# Patient Record
Sex: FEMALE | Race: WHITE | NOT HISPANIC OR LATINO | Employment: UNEMPLOYED | URBAN - METROPOLITAN AREA
[De-identification: names, ages, dates, MRNs, and addresses within clinical notes are randomized per-mention and may not be internally consistent; named-entity substitution may affect disease eponyms.]

---
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
## 2022-01-10 ENCOUNTER — NURSE TRIAGE (OUTPATIENT)
Dept: OTHER | Facility: OTHER | Age: 22
End: 2022-01-10

## 2022-01-10 DIAGNOSIS — Z20.828 SARS-ASSOCIATED CORONAVIRUS EXPOSURE: Primary | ICD-10-CM

## 2022-01-11 NOTE — TELEPHONE ENCOUNTER
Reason for Disposition   [1] COVID-19 infection suspected by caller or triager AND [2] mild symptoms (cough, fever, or others) AND [3] has not gotten tested yet    Answer Assessment - Initial Assessment Questions  Were you within 6 feet or less, for up to 15 minutes or more with a person that has a confirmed COVID-19 test? yes  What was the date of your exposure?  5 days ago  Are you experiencing any symptoms attributed to the virus?  (Assess for SOB, cough, fever, difficulty breathing) sore throat, body aches  HIGH RISK: Do you have any history heart or lung conditions, weakened immune system, diabetes, Asthma, CHF, HIV, COPD, Chemo, renal failure, sickle cell, etc? VSD  PREGNANCY: Are you pregnant or did you recently give birth? no    Protocols used: CORONAVIRUS (COVID-19) DIAGNOSED OR SUSPECTED-ADULT-

## 2022-01-11 NOTE — TELEPHONE ENCOUNTER
Regarding: Covid Symptoms- Body Aches, Sore throat   ----- Message from Virgie, MA sent at 1/10/2022  7:24 PM EST -----  I'm calling because I was expose to covid and I have been having symptoms- Sore throat, Body aches started about  1-2 ago   I would like to obtain a test

## 2022-01-27 ENCOUNTER — TELEPHONE (OUTPATIENT)
Dept: FAMILY MEDICINE CLINIC | Facility: CLINIC | Age: 22
End: 2022-01-27

## 2022-02-07 ENCOUNTER — OFFICE VISIT (OUTPATIENT)
Dept: FAMILY MEDICINE CLINIC | Facility: CLINIC | Age: 22
End: 2022-02-07

## 2022-02-07 VITALS
WEIGHT: 119 LBS | SYSTOLIC BLOOD PRESSURE: 116 MMHG | RESPIRATION RATE: 18 BRPM | TEMPERATURE: 98.2 F | HEIGHT: 59 IN | OXYGEN SATURATION: 98 % | HEART RATE: 78 BPM | DIASTOLIC BLOOD PRESSURE: 72 MMHG | BODY MASS INDEX: 23.99 KG/M2

## 2022-02-07 DIAGNOSIS — Z30.013 ENCOUNTER FOR INITIAL PRESCRIPTION OF INJECTABLE CONTRACEPTIVE: Primary | ICD-10-CM

## 2022-02-07 DIAGNOSIS — Z30.42 DEPO-PROVERA CONTRACEPTIVE STATUS: ICD-10-CM

## 2022-02-07 PROCEDURE — 99213 OFFICE O/P EST LOW 20 MIN: CPT | Performed by: FAMILY MEDICINE

## 2022-02-07 RX ORDER — MEDROXYPROGESTERONE ACETATE 150 MG/ML
150 INJECTION, SUSPENSION INTRAMUSCULAR ONCE
Status: DISCONTINUED | OUTPATIENT
Start: 2022-02-07 | End: 2022-02-07

## 2022-02-07 NOTE — ASSESSMENT & PLAN NOTE
Patient is here to start Depo-Provera for contraception  She reports she has used previously and tolerated well, no contraindications  BP today 116/72, at goal  Patient counseled, depo administered today

## 2022-02-07 NOTE — PROGRESS NOTES
[place] VISIT NOTE [place] [facility] 25 y o  (OHN:[birth year]) female MRN: [id number]  Unit/Bed#:  Encounter: [id number]      Assessment/Plan:    Encounter for initial prescription of injectable contraceptive  Patient is here to start Depo-Provera for contraception  She reports she has used previously and tolerated well, no contraindications  BP today 116/72, at goal  Patient counseled, depo administered today       Diagnoses and all orders for this visit:    Encounter for initial prescription of injectable contraceptive    Depo-Provera contraceptive status  -     medroxyPROGESTERone (DEPO-PROVERA) IM injection 150 mg          Subjective:      Patient ID: Amelia Browning is a 25 y o  female  Presents for birth control counseling  Pt researched different birth control options and insists that she would like to receive depo  Currently menstruating  Periods usually last 5-7 days  The following portions of the patient's history were reviewed and updated as appropriate: allergies, current medications, past family history, past medical history, past social history, past surgical history and problem list     Review of Systems   Constitutional: Negative for chills, fatigue and fever  Eyes: Negative for visual disturbance  Respiratory: Negative for shortness of breath  Cardiovascular: Negative for chest pain and leg swelling  Gastrointestinal: Negative for abdominal distention, abdominal pain, constipation, diarrhea, nausea and vomiting  Genitourinary: Positive for vaginal bleeding (currently menstruating)  Negative for menstrual problem  Musculoskeletal: Negative for myalgias  Neurological: Negative for light-headedness and headaches  Psychiatric/Behavioral: Negative for sleep disturbance           Objective:    /72 (BP Location: Left arm, Patient Position: Sitting, Cuff Size: Standard)   Pulse 78   Temp 98 2 °F (36 8 °C) (Temporal)   Resp 18   Ht 4' 11" (1 499 m)   Wt 54 kg (119 lb)   SpO2 98%   BMI 24 04 kg/m²        Physical Exam  Vitals reviewed  Constitutional:       General: She is not in acute distress  Appearance: She is not ill-appearing or toxic-appearing  HENT:      Head: Normocephalic  Mouth/Throat:      Mouth: Mucous membranes are moist       Pharynx: Oropharynx is clear  Eyes:      General: No scleral icterus  Conjunctiva/sclera: Conjunctivae normal       Pupils: Pupils are equal, round, and reactive to light  Cardiovascular:      Rate and Rhythm: Normal rate and regular rhythm  Pulses: Normal pulses  Heart sounds: Murmur heard  Pulmonary:      Effort: Pulmonary effort is normal       Breath sounds: Normal breath sounds  No stridor  Abdominal:      General: Abdomen is flat  Bowel sounds are normal  There is no distension  Palpations: Abdomen is soft  Tenderness: There is no abdominal tenderness  Musculoskeletal:         General: No swelling  Normal range of motion  Skin:     General: Skin is warm and dry  Coloration: Skin is not jaundiced  Neurological:      General: No focal deficit present  Mental Status: She is alert and oriented to person, place, and time  Mental status is at baseline  Psychiatric:         Mood and Affect: Mood normal          Behavior: Behavior normal          Thought Content:  Thought content normal          Judgment: Judgment normal            Andrea Bustamante MD  PGY-2 Family Medicine  02/07/22

## 2022-03-08 ENCOUNTER — PATIENT OUTREACH (OUTPATIENT)
Dept: FAMILY MEDICINE CLINIC | Facility: CLINIC | Age: 22
End: 2022-03-08

## 2022-03-08 DIAGNOSIS — Z76.89 ENCOUNTER FOR SOCIAL WORK INTERVENTION: Primary | ICD-10-CM

## 2022-03-08 NOTE — PROGRESS NOTES
Received call from patient who reports that she is experiencing some anxiety and is interested in becoming involved with mental health treatment  Patient reports that she and her 2 month old child reside with her parents  Patient reports she is currently at home caring for her baby and attending college classes online  She reports that her parents are very supportive to her and her child  Patient states that she has been experiencing anxiety lately related to relationships that she is involved with  She reports that she does not feel depressed and has no thoughts of hurting herself or her child  Reviewed local in network outpatient mental health options with patient and list of same has been emailed to her per her request   Supportive counseling provided to patient who denies further needs at this time  Will continue to be available to provide support

## 2022-03-14 DIAGNOSIS — F41.8 DEPRESSION WITH ANXIETY: ICD-10-CM

## 2022-03-14 RX ORDER — SERTRALINE HYDROCHLORIDE 25 MG/1
TABLET, FILM COATED ORAL
Qty: 90 TABLET | Refills: 0 | Status: SHIPPED | OUTPATIENT
Start: 2022-03-14

## 2022-04-27 ENCOUNTER — CLINICAL SUPPORT (OUTPATIENT)
Dept: FAMILY MEDICINE CLINIC | Facility: CLINIC | Age: 22
End: 2022-04-27

## 2022-04-27 DIAGNOSIS — Z30.42 DEPO-PROVERA CONTRACEPTIVE STATUS: Primary | ICD-10-CM

## 2022-04-27 PROCEDURE — 96372 THER/PROPH/DIAG INJ SC/IM: CPT

## 2022-04-27 RX ORDER — MEDROXYPROGESTERONE ACETATE 150 MG/ML
150 INJECTION, SUSPENSION INTRAMUSCULAR ONCE
Status: COMPLETED | OUTPATIENT
Start: 2022-04-27 | End: 2022-04-27

## 2022-04-27 RX ADMIN — MEDROXYPROGESTERONE ACETATE 150 MG: 150 INJECTION, SUSPENSION INTRAMUSCULAR at 15:09

## 2022-04-27 NOTE — PROGRESS NOTES
Patient here today as a nurse visit for her Depo-Provera contraceptive injection  Medroxy Progesterone 1 ml administered on left deltoid and patient tolerate well  Patient aware she need to come back for her next dose on 7/13-7/27 2022

## 2022-07-22 ENCOUNTER — CLINICAL SUPPORT (OUTPATIENT)
Dept: FAMILY MEDICINE CLINIC | Facility: CLINIC | Age: 22
End: 2022-07-22

## 2022-07-22 DIAGNOSIS — Z30.42 ENCOUNTER FOR SURVEILLANCE OF INJECTABLE CONTRACEPTIVE: Primary | ICD-10-CM

## 2022-07-22 LAB — SL AMB POCT URINE HCG: NEGATIVE

## 2022-07-22 PROCEDURE — 96372 THER/PROPH/DIAG INJ SC/IM: CPT

## 2022-07-22 PROCEDURE — 81025 URINE PREGNANCY TEST: CPT

## 2022-07-22 RX ORDER — MEDROXYPROGESTERONE ACETATE 150 MG/ML
150 INJECTION, SUSPENSION INTRAMUSCULAR ONCE
Status: COMPLETED | OUTPATIENT
Start: 2022-07-22 | End: 2022-07-22

## 2022-07-22 RX ADMIN — MEDROXYPROGESTERONE ACETATE 150 MG: 150 INJECTION, SUSPENSION INTRAMUSCULAR at 14:39

## 2022-07-22 NOTE — PROGRESS NOTES
Patient here today as a nurse visit for her Depo-Provera contraceptive injection  Medroxy Progesterone 1 ml administered on left deltoid and patient tolerate well  POCT urine HCG pregnancy test came back negative

## 2022-08-23 ENCOUNTER — TELEPHONE (OUTPATIENT)
Dept: FAMILY MEDICINE CLINIC | Facility: CLINIC | Age: 22
End: 2022-08-23

## 2022-09-01 ENCOUNTER — OFFICE VISIT (OUTPATIENT)
Dept: FAMILY MEDICINE CLINIC | Facility: CLINIC | Age: 22
End: 2022-09-01
Payer: COMMERCIAL

## 2022-09-01 VITALS
TEMPERATURE: 98 F | OXYGEN SATURATION: 96 % | BODY MASS INDEX: 22.38 KG/M2 | SYSTOLIC BLOOD PRESSURE: 112 MMHG | WEIGHT: 111 LBS | RESPIRATION RATE: 16 BRPM | HEART RATE: 104 BPM | DIASTOLIC BLOOD PRESSURE: 70 MMHG | HEIGHT: 59 IN

## 2022-09-01 DIAGNOSIS — F41.8 DEPRESSION WITH ANXIETY: ICD-10-CM

## 2022-09-01 DIAGNOSIS — F41.9 ANXIETY: ICD-10-CM

## 2022-09-01 DIAGNOSIS — Z00.00 ANNUAL PHYSICAL EXAM: Primary | ICD-10-CM

## 2022-09-01 DIAGNOSIS — Z30.42 ENCOUNTER FOR SURVEILLANCE OF INJECTABLE CONTRACEPTIVE: ICD-10-CM

## 2022-09-01 PROBLEM — Z20.822 CLOSE EXPOSURE TO COVID-19 VIRUS: Status: RESOLVED | Noted: 2021-12-13 | Resolved: 2022-09-01

## 2022-09-01 PROBLEM — Z3A.38 38 WEEKS GESTATION OF PREGNANCY: Status: RESOLVED | Noted: 2021-03-17 | Resolved: 2022-09-01

## 2022-09-01 PROCEDURE — 99395 PREV VISIT EST AGE 18-39: CPT | Performed by: FAMILY MEDICINE

## 2022-09-01 PROCEDURE — 3725F SCREEN DEPRESSION PERFORMED: CPT | Performed by: FAMILY MEDICINE

## 2022-09-01 PROCEDURE — 87491 CHLMYD TRACH DNA AMP PROBE: CPT | Performed by: FAMILY MEDICINE

## 2022-09-01 PROCEDURE — 87591 N.GONORRHOEAE DNA AMP PROB: CPT | Performed by: FAMILY MEDICINE

## 2022-09-01 RX ORDER — ESCITALOPRAM OXALATE 10 MG/1
10 TABLET ORAL DAILY
Qty: 90 TABLET | Refills: 1 | Status: SHIPPED | OUTPATIENT
Start: 2022-09-01

## 2022-09-01 NOTE — PATIENT INSTRUCTIONS

## 2022-09-01 NOTE — ASSESSMENT & PLAN NOTE
Compliant with Depo Provera, last given 7/22/22, due for repeat between 10/8 - 10/22/22, patient will schedule for follow up visit

## 2022-09-01 NOTE — PROGRESS NOTES
[facility]    NAME: Poncho Alberts  AGE: 23 y o  SEX: female  : 2000     DATE: 2022     Assessment and Plan:     Problem List Items Addressed This Visit        Other    Anxiety     Was previously on Fluoxetine and Sertraline without good relief but did not take medication for adequate period  Due to predominantly anxious symptoms vs depressed, will try a less activating SSRI, Lexapro, for symptom management  Behavioral health referral placed as well  Message sent to SW to provide pt with resources covered by her insurance  Will follow up in 6 weeks to determine response to medication           Relevant Medications    escitalopram (Lexapro) 10 mg tablet    Encounter for surveillance of injectable contraceptive     Compliant with Depo Provera, last given 22, due for repeat between 10/8 - 10/22/22, patient will schedule for follow up visit         Annual physical exam - Primary    Relevant Orders    Hepatitis C antibody    Chlamydia/GC amplified DNA by PCR          Immunizations and preventive care screenings were discussed with patient today  Appropriate education was printed on patient's after visit summary  Counseling:  Alcohol/drug use: discussed moderation in alcohol intake, the recommendations for healthy alcohol use, and avoidance of illicit drug use  Dental Health: discussed importance of regular tooth brushing, flossing, and dental visits  Sexual health: discussed sexually transmitted diseases, partner selection, use of condoms, avoidance of unintended pregnancy, and contraceptive alternatives  · Exercise: the importance of regular exercise/physical activity was discussed  Recommend exercise 3-5 times per week for at least 30 minutes  Return in 13 months (on 10/16/2023) for depo provera       Chief Complaint:     Chief Complaint   Patient presents with    Physical Exam     Annual well exam History of Present Illness:     Adult Annual Physical   Patient here for a comprehensive physical exam  The patient reports     Anxiety surrounding normal challenges of motherhood  She feels irritable often and would like to try an alternative SSRI for anxious mood  She is balancing full time work at [employer] to become a , and being a mom to 10 month old Mindy  Diet and Physical Activity  · Diet/Nutrition: well balanced diet  · Exercise: no formal exercise  Depression Screening  PHQ-2/9 Depression Screening    Little interest or pleasure in doing things: 0 - not at all  Feeling down, depressed, or hopeless: 0 - not at all  Trouble falling or staying asleep, or sleeping too much: 0 - not at all  Feeling tired or having little energy: 0 - not at all  Poor appetite or overeatin - not at all  Feeling bad about yourself - or that you are a failure or have let yourself or your family down: 0 - not at all  Trouble concentrating on things, such as reading the newspaper or watching television: 0 - not at all  Moving or speaking so slowly that other people could have noticed  Or the opposite - being so fidgety or restless that you have been moving around a lot more than usual: 0 - not at all  Thoughts that you would be better off dead, or of hurting yourself in some way: 0 - not at all  PHQ-9 Score: 0   PHQ-9 Interpretation: No or Minimal depression        General Health  · Sleep: sleeps well  · Hearing: normal - bilateral   · Vision: goes for regular eye exams, most recent eye exam >1 year ago and wears glasses  · Dental: no dental visits for >1 year and brushes teeth twice daily  /GYN Health  · Contraceptive method: injectable contraception  Due for next depo shot between 10/8-10/22  · History of STDs?: no      Review of Systems:     Review of Systems   Psychiatric/Behavioral: The patient is nervous/anxious  All other systems reviewed and are negative  Past Medical History:     Past Medical History:   Diagnosis Date    Anxiety     Hyperlipidemia     Migraine       Past Surgical History:     History reviewed  No pertinent surgical history  Social History:     Social History     Socioeconomic History    Marital status: Single     Spouse name: None    Number of children: None    Years of education: None    Highest education level: None   Occupational History    None   Tobacco Use    Smoking status: Never Smoker    Smokeless tobacco: Never Used    Tobacco comment: vaping   Vaping Use    Vaping Use: Never used   Substance and Sexual Activity    Alcohol use: No    Drug use: No    Sexual activity: Yes     Partners: Male   Other Topics Concern    None   Social History Narrative    None     Social Determinants of Health     Financial Resource Strain: Medium Risk    Difficulty of Paying Living Expenses: Somewhat hard   Food Insecurity: No Food Insecurity    Worried About Running Out of Food in the Last Year: Never true    Liza of Food in the Last Year: Never true   Transportation Needs: No Transportation Needs    Lack of Transportation (Medical): No    Lack of Transportation (Non-Medical): No   Physical Activity: Not on file   Stress: Stress Concern Present    Feeling of Stress :  To some extent   Social Connections: Not on file   Intimate Partner Violence: Not on file   Housing Stability: Unknown    Unable to Pay for Housing in the Last Year: No    Number of [place] in the Last Year: Not on file    Unstable Housing in the Last Year: No      Family History:     Family History   Problem Relation Age of Onset    No Known Problems Mother     Heart disease Father       Current Medications:     Current Outpatient Medications   Medication Sig Dispense Refill    acetaminophen (TYLENOL) 325 mg tablet Take 2 tablets (650 mg total) by mouth every 4 (four) hours as needed for mild pain or headaches 30 tablet 0    escitalopram (Lexapro) 10 mg tablet Take 1 tablet (10 mg total) by mouth daily 90 tablet 1    ibuprofen (MOTRIN) 600 mg tablet Take 1 tablet (600 mg total) by mouth every 6 (six) hours as needed (cramping) 30 tablet 0     No current facility-administered medications for this visit  Allergies: Allergies   Allergen Reactions    Penicillins Hives      Physical Exam:     /70 (BP Location: Left arm, Patient Position: Sitting, Cuff Size: Standard)   Pulse 104   Temp 98 °F (36 7 °C) (Temporal)   Resp 16   Ht 4' 11" (1 499 m)   Wt 50 3 kg (111 lb)   SpO2 96%   BMI 22 42 kg/m²     Physical Exam  Vitals reviewed  Constitutional:       Appearance: Normal appearance  HENT:      Head: Normocephalic and atraumatic  Right Ear: External ear normal       Left Ear: External ear normal       Nose: Nose normal       Mouth/Throat:      Mouth: Mucous membranes are moist    Eyes:      Extraocular Movements: Extraocular movements intact  Conjunctiva/sclera: Conjunctivae normal    Cardiovascular:      Rate and Rhythm: Normal rate  Pulmonary:      Effort: Pulmonary effort is normal    Abdominal:      General: Abdomen is flat  Musculoskeletal:         General: Normal range of motion  Skin:     General: Skin is warm and dry  Neurological:      General: No focal deficit present  Mental Status: She is alert and oriented to person, place, and time     Psychiatric:         Mood and Affect: Mood normal          Behavior: Behavior normal           Arnoldo To DO   [facility]

## 2022-09-01 NOTE — ASSESSMENT & PLAN NOTE
Was previously on Fluoxetine and Sertraline without good relief but did not take medication for adequate period  Due to predominantly anxious symptoms vs depressed, will try a less activating SSRI, Lexapro, for symptom management  Behavioral health referral placed as well  Message sent to SW to provide pt with resources covered by her insurance  Will follow up in 6 weeks to determine response to medication

## 2022-09-02 ENCOUNTER — PATIENT OUTREACH (OUTPATIENT)
Dept: FAMILY MEDICINE CLINIC | Facility: CLINIC | Age: 22
End: 2022-09-02

## 2022-09-02 LAB
C TRACH DNA SPEC QL NAA+PROBE: NEGATIVE
N GONORRHOEA DNA SPEC QL NAA+PROBE: NEGATIVE

## 2022-09-02 NOTE — PROGRESS NOTES
Referral received from PCP with request for OP SWCM to outreach patient and assist in finding therapy/psychiatry services that are in-network with her insurance related to anxiety and depression  Per chart review, Nely Cutler  Nichole Sullivan provided patient with list of in-network providers in the past but patient does not have this list anymore  Patient has [facility] and [employer], per chart review  Call placed to further assist   No answer, voicemail left, and awaiting return call

## 2022-09-04 ENCOUNTER — HOSPITAL ENCOUNTER (EMERGENCY)
Facility: HOSPITAL | Age: 22
Discharge: HOME | End: 2022-09-04
Attending: EMERGENCY MEDICINE
Payer: COMMERCIAL

## 2022-09-04 ENCOUNTER — APPOINTMENT (OUTPATIENT)
Dept: RADIOLOGY | Facility: HOSPITAL | Age: 22
End: 2022-09-04
Payer: COMMERCIAL

## 2022-09-04 VITALS
WEIGHT: 111 LBS | SYSTOLIC BLOOD PRESSURE: 114 MMHG | TEMPERATURE: 98.7 F | OXYGEN SATURATION: 97 % | BODY MASS INDEX: 22.42 KG/M2 | DIASTOLIC BLOOD PRESSURE: 70 MMHG | HEART RATE: 63 BPM | RESPIRATION RATE: 16 BRPM

## 2022-09-04 DIAGNOSIS — R09.1 PLEURISY: Primary | ICD-10-CM

## 2022-09-04 PROBLEM — Q21.0 VSD (VENTRICULAR SEPTAL DEFECT AND AORTIC ARCH HYPOPLASIA: Status: ACTIVE | Noted: 2022-09-04

## 2022-09-04 PROBLEM — R01.1 HEART MURMUR: Status: ACTIVE | Noted: 2022-09-04

## 2022-09-04 PROBLEM — Q25.42 VSD (VENTRICULAR SEPTAL DEFECT AND AORTIC ARCH HYPOPLASIA: Status: ACTIVE | Noted: 2022-09-04

## 2022-09-04 LAB
ALBUMIN SERPL BCP-MCNC: 4.3 G/DL (ref 3.5–5)
ALP SERPL-CCNC: 91 U/L (ref 34–104)
ALT SERPL W P-5'-P-CCNC: 14 U/L (ref 7–52)
ANION GAP SERPL CALCULATED.3IONS-SCNC: 8 MMOL/L (ref 4–13)
AST SERPL W P-5'-P-CCNC: 19 U/L (ref 13–39)
ATRIAL RATE: 71 BPM
BACTERIA UR QL AUTO: ABNORMAL /HPF
BASOPHILS # BLD AUTO: 0.04 THOUSANDS/ΜL (ref 0–0.1)
BASOPHILS NFR BLD AUTO: 1 % (ref 0–1)
BILIRUB SERPL-MCNC: 0.41 MG/DL (ref 0.2–1)
BILIRUB UR QL STRIP: NEGATIVE
BUN SERPL-MCNC: 9 MG/DL (ref 5–25)
CALCIUM SERPL-MCNC: 9.2 MG/DL (ref 8.4–10.2)
CARDIAC TROPONIN I PNL SERPL HS: <2 NG/L
CHLORIDE SERPL-SCNC: 110 MMOL/L (ref 96–108)
CLARITY UR: ABNORMAL
CO2 SERPL-SCNC: 23 MMOL/L (ref 21–32)
COLOR UR: YELLOW
CREAT SERPL-MCNC: 0.7 MG/DL (ref 0.6–1.3)
EOSINOPHIL # BLD AUTO: 0.14 THOUSAND/ΜL (ref 0–0.61)
EOSINOPHIL NFR BLD AUTO: 2 % (ref 0–6)
ERYTHROCYTE [DISTWIDTH] IN BLOOD BY AUTOMATED COUNT: 12.7 % (ref 11.6–15.1)
EXT PREG TEST URINE: NEGATIVE
EXT. CONTROL ED NAV: NORMAL
GFR SERPL CREATININE-BSD FRML MDRD: 125 ML/MIN/1.73SQ M
GLUCOSE SERPL-MCNC: 109 MG/DL (ref 65–140)
GLUCOSE UR STRIP-MCNC: NEGATIVE MG/DL
HCT VFR BLD AUTO: 46.3 % (ref 34.8–46.1)
HGB BLD-MCNC: 15 G/DL (ref 11.5–15.4)
HGB UR QL STRIP.AUTO: NEGATIVE
IMM GRANULOCYTES # BLD AUTO: 0.01 THOUSAND/UL (ref 0–0.2)
IMM GRANULOCYTES NFR BLD AUTO: 0 % (ref 0–2)
KETONES UR STRIP-MCNC: NEGATIVE MG/DL
LEUKOCYTE ESTERASE UR QL STRIP: ABNORMAL
LYMPHOCYTES # BLD AUTO: 2.38 THOUSANDS/ΜL (ref 0.6–4.47)
LYMPHOCYTES NFR BLD AUTO: 37 % (ref 14–44)
MCH RBC QN AUTO: 29 PG (ref 26.8–34.3)
MCHC RBC AUTO-ENTMCNC: 32.4 G/DL (ref 31.4–37.4)
MCV RBC AUTO: 89 FL (ref 82–98)
MONOCYTES # BLD AUTO: 0.56 THOUSAND/ΜL (ref 0.17–1.22)
MONOCYTES NFR BLD AUTO: 9 % (ref 4–12)
MUCOUS THREADS UR QL AUTO: ABNORMAL
NEUTROPHILS # BLD AUTO: 3.26 THOUSANDS/ΜL (ref 1.85–7.62)
NEUTS SEG NFR BLD AUTO: 51 % (ref 43–75)
NITRITE UR QL STRIP: NEGATIVE
NON-SQ EPI CELLS URNS QL MICRO: ABNORMAL /HPF
NRBC BLD AUTO-RTO: 0 /100 WBCS
P AXIS: 30 DEGREES
PH UR STRIP.AUTO: 7 [PH]
PLATELET # BLD AUTO: 252 THOUSANDS/UL (ref 149–390)
PMV BLD AUTO: 9.8 FL (ref 8.9–12.7)
POTASSIUM SERPL-SCNC: 3.8 MMOL/L (ref 3.5–5.3)
PR INTERVAL: 148 MS
PROT SERPL-MCNC: 6.8 G/DL (ref 6.4–8.4)
PROT UR STRIP-MCNC: ABNORMAL MG/DL
QRS AXIS: 41 DEGREES
QRSD INTERVAL: 80 MS
QT INTERVAL: 372 MS
QTC INTERVAL: 404 MS
RBC # BLD AUTO: 5.18 MILLION/UL (ref 3.81–5.12)
RBC #/AREA URNS AUTO: ABNORMAL /HPF
SODIUM SERPL-SCNC: 141 MMOL/L (ref 135–147)
SP GR UR STRIP.AUTO: 1.03 (ref 1–1.03)
T WAVE AXIS: 28 DEGREES
UROBILINOGEN UR STRIP-ACNC: <2 MG/DL
VENTRICULAR RATE: 71 BPM
WBC # BLD AUTO: 6.39 THOUSAND/UL (ref 4.31–10.16)
WBC #/AREA URNS AUTO: ABNORMAL /HPF

## 2022-09-04 PROCEDURE — 81001 URINALYSIS AUTO W/SCOPE: CPT

## 2022-09-04 PROCEDURE — 81025 URINE PREGNANCY TEST: CPT

## 2022-09-04 PROCEDURE — 99284 EMERGENCY DEPT VISIT MOD MDM: CPT

## 2022-09-04 PROCEDURE — 84484 ASSAY OF TROPONIN QUANT: CPT

## 2022-09-04 PROCEDURE — 71046 X-RAY EXAM CHEST 2 VIEWS: CPT

## 2022-09-04 PROCEDURE — 93010 ELECTROCARDIOGRAM REPORT: CPT | Performed by: INTERNAL MEDICINE

## 2022-09-04 PROCEDURE — 80053 COMPREHEN METABOLIC PANEL: CPT

## 2022-09-04 PROCEDURE — 99285 EMERGENCY DEPT VISIT HI MDM: CPT | Performed by: EMERGENCY MEDICINE

## 2022-09-04 PROCEDURE — 85025 COMPLETE CBC W/AUTO DIFF WBC: CPT

## 2022-09-04 PROCEDURE — 93005 ELECTROCARDIOGRAM TRACING: CPT

## 2022-09-04 PROCEDURE — 36415 COLL VENOUS BLD VENIPUNCTURE: CPT

## 2022-09-04 NOTE — ED PROVIDER NOTES
History  Chief Complaint   Patient presents with    Rib Pain     Left sided rib pain when taking deep breath     Klever Lopez is a 23year old female presenting for evaluation of 2 days left-sided chest discomfort worse with deep breathing  The patient denies any inciting incident or injuries  She denies any significant shortness of breath or difficulty breathing  No recent infections, fevers, chills  She has a history of VSD, otherwise cardiopulmonary history was unremarkable  The patient admits to heavy vape use  No prior history of DVT or PE  Patient uses Depo-Provera for contraception  No recent travel, no recent surgeries  No recent lower extremity swelling or tenderness  Patient expressed concern about her pain being related to her kidney, she denies dysuria or hematuria at this time  History provided by:  Patient   used: No        Prior to Admission Medications   Prescriptions Last Dose Informant Patient Reported? Taking?   acetaminophen (TYLENOL) 325 mg tablet  Self No No   Sig: Take 2 tablets (650 mg total) by mouth every 4 (four) hours as needed for mild pain or headaches   escitalopram (Lexapro) 10 mg tablet   No No   Sig: Take 1 tablet (10 mg total) by mouth daily   ibuprofen (MOTRIN) 600 mg tablet  Self No No   Sig: Take 1 tablet (600 mg total) by mouth every 6 (six) hours as needed (cramping)      Facility-Administered Medications: None       Past Medical History:   Diagnosis Date    Anxiety     Heart murmur     Hyperlipidemia     Migraine     VSD (ventricular septal defect and aortic arch hypoplasia        History reviewed  No pertinent surgical history  Family History   Problem Relation Age of Onset    No Known Problems Mother     Heart disease Father      I have reviewed and agree with the history as documented      E-Cigarette/Vaping    E-Cigarette Use Current Every Day User      E-Cigarette/Vaping Substances    Nicotine Yes     THC No     CBD No     Flavoring Yes     Other No     Unknown No      Social History     Tobacco Use    Smoking status: Never Smoker    Smokeless tobacco: Never Used    Tobacco comment: vaping   Vaping Use    Vaping Use: Every day    Substances: Nicotine, Flavoring   Substance Use Topics    Alcohol use: No    Drug use: No        Review of Systems   Constitutional: Negative for chills and fever  HENT: Negative for ear pain and sore throat  Eyes: Negative for pain and visual disturbance  Respiratory: Negative for cough and shortness of breath  Cardiovascular: Positive for chest pain  Negative for palpitations and leg swelling  Gastrointestinal: Negative for abdominal pain and vomiting  Genitourinary: Negative for dysuria and hematuria  Musculoskeletal: Negative for arthralgias and back pain  Skin: Negative for color change and rash  Neurological: Negative for seizures and syncope  All other systems reviewed and are negative  Physical Exam  ED Triage Vitals   Temperature Pulse Respirations Blood Pressure SpO2   09/04/22 1332 09/04/22 1332 09/04/22 1332 09/04/22 1332 09/04/22 1332   98 7 °F (37 1 °C) 70 18 116/76 99 %      Temp Source Heart Rate Source Patient Position - Orthostatic VS BP Location FiO2 (%)   09/04/22 1332 09/04/22 1332 -- 09/04/22 1443 --   Oral Monitor  Right arm       Pain Score       09/04/22 1332       8             Orthostatic Vital Signs  Vitals:    09/04/22 1332 09/04/22 1443 09/04/22 1630   BP: 116/76 133/75 114/70   Pulse: 70 73 63       Physical Exam  Vitals and nursing note reviewed  Constitutional:       General: She is not in acute distress  Appearance: Normal appearance  HENT:      Head: Normocephalic and atraumatic  Mouth/Throat:      Mouth: Mucous membranes are moist       Pharynx: Oropharynx is clear  Eyes:      General: No scleral icterus  Conjunctiva/sclera: Conjunctivae normal    Cardiovascular:      Rate and Rhythm: Normal rate and regular rhythm  Heart sounds: Murmur heard  Pulmonary:      Effort: Pulmonary effort is normal  No respiratory distress  Breath sounds: Normal breath sounds  No wheezing, rhonchi or rales  Abdominal:      General: Abdomen is flat  There is no distension  Tenderness: There is no abdominal tenderness  Musculoskeletal:         General: No tenderness or signs of injury  Cervical back: Neck supple  No rigidity  Right lower leg: No edema  Left lower leg: No edema  Skin:     General: Skin is warm  Coloration: Skin is not jaundiced  Findings: No erythema or rash  Neurological:      General: No focal deficit present  Mental Status: She is alert  Mental status is at baseline     Psychiatric:         Mood and Affect: Mood normal          Behavior: Behavior normal          ED Medications  Medications - No data to display    Diagnostic Studies  Results Reviewed     Procedure Component Value Units Date/Time    Comprehensive metabolic panel [[id number]]  (Abnormal) Collected: 09/04/22 1548    Lab Status: Final result Specimen: Blood from Arm, Left Updated: 09/04/22 1629     Sodium 141 mmol/L      Potassium 3 8 mmol/L      Chloride 110 mmol/L      CO2 23 mmol/L      ANION GAP 8 mmol/L      BUN 9 mg/dL      Creatinine 0 70 mg/dL      Glucose 109 mg/dL      Calcium 9 2 mg/dL      AST 19 U/L      ALT 14 U/L      Alkaline Phosphatase 91 U/L      Total Protein 6 8 g/dL      Albumin 4 3 g/dL      Total Bilirubin 0 41 mg/dL      eGFR 125 ml/min/1 73sq m     Narrative:      [place] guidelines for Chronic Kidney Disease (CKD):     Stage 1 with normal or high GFR (GFR > 90 mL/min/1 73 square meters)    Stage 2 Mild CKD (GFR = 60-89 mL/min/1 73 square meters)    Stage 3A Moderate CKD (GFR = 45-59 mL/min/1 73 square meters)    Stage 3B Moderate CKD (GFR = 30-44 mL/min/1 73 square meters)    Stage 4 Severe CKD (GFR = 15-29 mL/min/1 73 square meters)    Stage 5 End Stage CKD (GFR <15 mL/min/1 73 square meters)  Note: GFR calculation is accurate only with a steady state creatinine    HS Troponin 0hr (reflex protocol) [[id number]]  (Normal) Collected: 09/04/22 1548    Lab Status: Final result Specimen: Blood from Arm, Left Updated: 09/04/22 1629     hs TnI 0hr <2 ng/L     Urine Microscopic [[id number]]  (Abnormal) Collected: 09/04/22 1549    Lab Status: Final result Specimen: Urine, Clean Catch Updated: 09/04/22 1617     RBC, UA 1-2 /hpf      WBC, UA 4-10 /hpf      Epithelial Cells Occasional /hpf      Bacteria, UA Occasional /hpf      MUCUS THREADS Occasional    POCT pregnancy, urine [[id number]]  (Normal) Resulted: 09/04/22 1610    Lab Status: Final result Updated: 09/04/22 1610     EXT PREG TEST UR (Ref: Negative) NEGATIVE     Control VALID    UA w Reflex to Microscopic w Reflex to Culture [[id number]]  (Abnormal) Collected: 09/04/22 1549    Lab Status: Final result Specimen: Urine, Clean Catch Updated: 09/04/22 1609     Color, UA Yellow     Clarity, UA Turbid     Specific [place], UA 1 026     pH, UA 7 0     Leukocytes, UA Trace     Nitrite, UA Negative     Protein, UA Trace mg/dl      Glucose, UA Negative mg/dl      Ketones, UA Negative mg/dl      Urobilinogen, UA <2 0 mg/dl      Bilirubin, UA Negative     Occult Blood, UA Negative    CBC and differential [[id number]]  (Abnormal) Collected: 09/04/22 1548    Lab Status: Final result Specimen: Blood from Arm, Left Updated: 09/04/22 1600     WBC 6 39 Thousand/uL      RBC 5 18 Million/uL      Hemoglobin 15 0 g/dL      Hematocrit 46 3 %      MCV 89 fL      MCH 29 0 pg      MCHC 32 4 g/dL      RDW 12 7 %      MPV 9 8 fL      Platelets 451 Thousands/uL      nRBC 0 /100 WBCs      Neutrophils Relative 51 %      Immat GRANS % 0 %      Lymphocytes Relative 37 %      Monocytes Relative 9 %      Eosinophils Relative 2 %      Basophils Relative 1 %      Neutrophils Absolute 3 26 Thousands/µL      Immature Grans Absolute 0 01 Thousand/uL      Lymphocytes Absolute 2 38 Thousands/µL      Monocytes Absolute 0 56 Thousand/µL      Eosinophils Absolute 0 14 Thousand/µL      Basophils Absolute 0 04 Thousands/µL                  XR chest 2 views   ED Interpretation by [id number], DO (09/04 1641)   No acute cardiopulmonary abnormalities            Procedures  ECG 12 Lead Documentation Only    Date/Time: 9/4/2022 7:02 PM  Performed by: Natalie Murphy DO  Authorized by: Natalie Murphy DO     Indications / Diagnosis:  Chest pain  ECG reviewed by me, the ED Provider: yes    Patient location:  ED  Previous ECG:     Previous ECG:  Compared to current    Similarity:  No change  Interpretation:     Interpretation: normal    Rate:     ECG rate:  71    ECG rate assessment: normal    Rhythm:     Rhythm: sinus rhythm    Ectopy:     Ectopy: none    QRS:     QRS axis:  Normal    QRS intervals:  Normal  Conduction:     Conduction: normal    ST segments:     ST segments:  Normal  T waves:     T waves: normal    Comments:      Normal sinus rhythm          ED Course                     PERC Rule for PE    Flowsheet Row Most Recent Value   PERC Rule for PE    Age >=50 0 Filed at: 09/04/2022 1507   HR >=100 0 Filed at: 09/04/2022 1507   O2 Sat on room air < 95% 0 Filed at: 09/04/2022 1507   History of PE or DVT 0 Filed at: 09/04/2022 1507   Recent trauma or surgery 0 Filed at: 09/04/2022 1507   Hemoptysis 0 Filed at: 09/04/2022 1507   Exogenous estrogen 0 Filed at: 09/04/2022 1507   Unilateral leg swelling 0 Filed at: 09/04/2022 1507   PERC Rule for PE Results 0 Filed at: 09/04/2022 1507                  Wells' Criteria for PE    Flowsheet Row Most Recent Value   Wells' Criteria for PE    Clinical signs and symptoms of DVT 0 Filed at: 09/04/2022 1507   PE is primary diagnosis or equally likely 0 Filed at: 09/04/2022 1507   HR >100 0 Filed at: 09/04/2022 1507   Immobilization at least 3 days or Surgery in the previous 4 weeks 0 Filed at: 09/04/2022 1507 Previous, objectively diagnosed PE or DVT 0 Filed at: 09/04/2022 1507   Hemoptysis 0 Filed at: 09/04/2022 1507   Malignancy with treatment within 6 months or palliative 0 Filed at: 09/04/2022 1507   Wells' Criteria Total 0 Filed at: 09/04/2022 1507            MDM  Number of Diagnoses or Management Options  Pleurisy: new and requires workup  Risk of Complications, Morbidity, and/or Mortality  General comments: Lb Hernandez is a 23year old female presenting for evaluation of 2 days left-sided chest discomfort worse with deep breathing  Patient is a chronic vape user  She does not have any significant cardiopulmonary history  Does not have significant risk factors for DVT or PE aside from smoking  Patient was not in any acute distress on exam, she was not tachypneic nor tachycardic, no increased work of breathing, she was satting 97-98% on room air  Heart and lung sounds were normal   Perc score of 0, I do not suspect pulmonary embolism at this time  I suspect that the patient has pleurisy secondary to her chronic vape use  ECG showed normal sinus rhythm, troponin within normal limits  Other blood work unremarkable  Chest x-ray unremarkable  Patient likely has pleuritic pain due to her vape use, advised that she stop vaping  I gave her very strict return precautions regarding chest pain, advised that she follow-up with her PCP, she was agreeable to the plan  Patient Progress  Patient progress: stable      Disposition  Final diagnoses:   Pleurisy     Time reflects when diagnosis was documented in both MDM as applicable and the Disposition within this note     Time User Action Codes Description Comment    9/4/2022  4:46 PM Wilner Gallegos Add [R09 1] Pleurisy       ED Disposition     ED Disposition   Discharge    Condition   Stable    Date/Time   Sun Sep 4, 2022  4:46 PM    Comment   Arlene Tasha discharge to home/self care                 Follow-up Information     Follow up With Specialties Details Why Contact Aden Tadeo Found, DO Family Medicine Schedule an appointment as soon as possible for a visit  As needed [id number]  [phone]            Discharge Medication List as of 9/4/2022  4:47 PM      CONTINUE these medications which have NOT CHANGED    Details   acetaminophen (TYLENOL) 325 mg tablet Take 2 tablets (650 mg total) by mouth every 4 (four) hours as needed for mild pain or headaches, Starting Wed 11/3/2021, Normal      escitalopram (Lexapro) 10 mg tablet Take 1 tablet (10 mg total) by mouth daily, Starting u 9/1/2022, Normal      ibuprofen (MOTRIN) 600 mg tablet Take 1 tablet (600 mg total) by mouth every 6 (six) hours as needed (cramping), Starting Wed 11/3/2021, Normal           No discharge procedures on file  PDMP Review     None           ED Provider  Attending physically available and evaluated [facility]  I managed the patient along with the ED Attending      Electronically Signed by         Samaria Judge DO  09/04/22 1594

## 2022-09-04 NOTE — Clinical Note
Kaila Smith was seen and treated in our emergency department on 9/4/2022  No restrictions            Diagnosis:     Carisa Tang  may return to work on return date  She may return on this date: 09/07/2022         If you have any questions or concerns, please don't hesitate to call        Nicho Huston RN    ______________________________           _______________          _______________  Hospital Representative                              Date                                Time

## 2022-09-04 NOTE — DISCHARGE INSTRUCTIONS
Attempt to refrain from smoking  Should you have worsening chest pain or shortness of breath, please return to the emergency department

## 2022-09-04 NOTE — Clinical Note
Ericka Nilsa was seen and treated in our emergency department on 9/4/2022  No restrictions            Diagnosis:     Andrade Fox  may return to work on return date  She may return on this date: 09/07/2022         If you have any questions or concerns, please don't hesitate to call        Aurora Dubon RN    ______________________________           _______________          _______________  Hospital Representative                              Date                                Time

## 2022-09-06 ENCOUNTER — PATIENT OUTREACH (OUTPATIENT)
Dept: FAMILY MEDICINE CLINIC | Facility: CLINIC | Age: 22
End: 2022-09-06

## 2022-09-06 NOTE — PROGRESS NOTES
Message received from patient in response to OP SWCM's initial outreach attempt to assist patient in establishing care with behavioral health services  Patient has lunch break from work around [employer]     Call placed to patient at 1:10PM   Spoke with patient who confirmed she is interested in seeing therapy and psychiatry for anxiety & depression  Patient denied any negative thoughts/feelings at this time and lives with her parents who are supportive  Patient works varying at [employer]; 10:30AM-6:30PM or 9AM-5PM   Patient verified insurance is through her dad's employer (private plan)  Informed that [facility] in [place] accepts her insurance  Patient agreed for OP SWCM to outreach [facility] to confirm insurance and start process of scheduling appts  Patient prefers in-person appts  No other needs at this time  Spoke with [facility] and rep confirmed they accept patient's insurance  They offer in-person appts for therapy and phone-only appts for psychiatry  Patient agreeable to this  Patient will call [facility] to verify schedule and coordinate with available appts  Phone # provided  Patient to call OP SWCM if she has difficulty with this  OP SWCM will follow-up in roughly 1 week to ensure appt has been scheduled  Routed to PCP

## 2022-09-13 ENCOUNTER — PATIENT OUTREACH (OUTPATIENT)
Dept: FAMILY MEDICINE CLINIC | Facility: CLINIC | Age: 22
End: 2022-09-13

## 2022-09-13 NOTE — PROGRESS NOTES
Call placed to patient to check status and follow-up on therapy/psychiatry services  Patient states she is scheduled for 1st therapy appt through [facility] on Monday 9/19  Patient will then be scheduled for psychiatry appt after therapy session is complete  Patient denied any additional needs at this time  Will close case but remain available to assist with any future needs

## 2022-10-17 ENCOUNTER — TELEPHONE (OUTPATIENT)
Dept: FAMILY MEDICINE CLINIC | Facility: CLINIC | Age: 22
End: 2022-10-17

## 2022-10-17 DIAGNOSIS — Z30.42 ENCOUNTER FOR SURVEILLANCE OF INJECTABLE CONTRACEPTIVE: Primary | ICD-10-CM

## 2022-10-17 RX ORDER — MEDROXYPROGESTERONE ACETATE 150 MG/ML
150 INJECTION, SUSPENSION INTRAMUSCULAR
Qty: 1 ML | Refills: 1 | Status: SHIPPED | OUTPATIENT
Start: 2022-10-17

## 2022-10-17 NOTE — TELEPHONE ENCOUNTER
Pt was scheduled for her Depo Provera injection today  However, medication was not sent to pharmacy and pt thought we keep the medication (Depo BC) in the office  Can you please send in Rx for Depo shot to pt's pharmacy  She will pick it up today and come in tomorrow for shot  Please see Dr Magdaleno  note excerpt from 9/1st OV         Encounter for surveillance of injectable contraceptive        Compliant with Depo Provera, last given 7/22/22, due for repeat between 10/8 - 10/22/22, patient will schedule for follow up visit

## 2022-10-18 ENCOUNTER — CLINICAL SUPPORT (OUTPATIENT)
Dept: FAMILY MEDICINE CLINIC | Facility: CLINIC | Age: 22
End: 2022-10-18
Payer: COMMERCIAL

## 2022-10-18 VITALS — TEMPERATURE: 98 F

## 2022-10-18 DIAGNOSIS — Z30.013 ENCOUNTER FOR INITIAL PRESCRIPTION OF INJECTABLE CONTRACEPTIVE: Primary | ICD-10-CM

## 2022-10-18 LAB — SL AMB POCT URINE HCG: NEGATIVE

## 2022-10-18 PROCEDURE — 96372 THER/PROPH/DIAG INJ SC/IM: CPT

## 2022-10-18 PROCEDURE — 81025 URINE PREGNANCY TEST: CPT

## 2022-10-18 RX ORDER — MEDROXYPROGESTERONE ACETATE 150 MG/ML
150 INJECTION, SUSPENSION INTRAMUSCULAR ONCE
Status: COMPLETED | OUTPATIENT
Start: 2022-10-18 | End: 2022-10-18

## 2022-10-18 RX ADMIN — MEDROXYPROGESTERONE ACETATE 150 MG: 150 INJECTION, SUSPENSION INTRAMUSCULAR at 13:11

## 2022-10-18 NOTE — TELEPHONE ENCOUNTER
Prescription sent  As this is first dose in our office, could you please check urine pregnancy test prior to administration  Thank you

## 2022-11-08 ENCOUNTER — OFFICE VISIT (OUTPATIENT)
Dept: URGENT CARE | Age: 22
End: 2022-11-08

## 2022-11-08 VITALS
SYSTOLIC BLOOD PRESSURE: 115 MMHG | HEART RATE: 66 BPM | OXYGEN SATURATION: 97 % | DIASTOLIC BLOOD PRESSURE: 67 MMHG | TEMPERATURE: 97.8 F | RESPIRATION RATE: 16 BRPM

## 2022-11-08 DIAGNOSIS — H92.01 RIGHT EAR PAIN: Primary | ICD-10-CM

## 2022-11-08 NOTE — PROGRESS NOTES
[facility]        NAME: Humera Palma is a 23 y o  female  : 2000    MRN: [id number]  DATE: 2022  TIME: 6:04 PM    Assessment and Plan   Right ear pain [H92 01]  1  Right ear pain     44-year-old female presents for evaluation of right ear pain since this morning  No signs of otitis media on exam   Recommend patient alternate Tylenol/ibuprofen and apply warm compresses and practice mastoid massage, as the pain is located chiefly in the posterior aspect of her ear along the mandibular angle  Follow-up with primary care provider if symptoms do not    Patient Instructions   Earache   DISCHARGE INSTRUCTIONS:   Return to the emergency department if:   · You have a severe earache      · You have ear pain with itching, hearing loss, dizziness, a feeling of fullness in your ear, or ringing in your ears      Call your doctor if:   · Your ear pain worsens or does not go away with treatment      · You have drainage from your ear      · You have a fever      · Your outer ear becomes red, swollen, and warm      · You have questions or concerns about your condition or care      Medicines: You may need any of the following:  · Acetaminophen  decreases pain and fever  It is available without a doctor's order  Ask how much to take and how often to take it  Follow directions  Read the labels of all other medicines you are using to see if they also contain acetaminophen, or ask your doctor or pharmacist  Acetaminophen can cause liver damage if not taken correctly  Do not use more than 4 grams (4,000 milligrams) total of acetaminophen in one day       · NSAIDs , such as ibuprofen, help decrease swelling, pain, and fever  This medicine is available with or without a doctor's order  NSAIDs can cause stomach bleeding or kidney problems in certain people  If you take blood thinner medicine, always ask your healthcare provider if NSAIDs are safe for you   Always read the medicine label and follow directions      · Do not give aspirin to children under 25years of age  Your child could develop Reye syndrome if he takes aspirin  Reye syndrome can cause life-threatening brain and liver damage  Check your child's medicine labels for aspirin, salicylates, or oil of wintergreen       · Take your medicine as directed  Contact your healthcare provider if you think your medicine is not helping or if you have side effects  Tell him or her if you are allergic to any medicine  Keep a list of the medicines, vitamins, and herbs you take  Include the amounts, and when and why you take them  Bring the list or the pill bottles to follow-up visits  Carry your medicine list with you in case of an emergency      Follow up with your doctor as directed:  Write down your questions so you remember to ask them during your visits  © [employer] 2022 Information is for End User's use only and may not be sold, redistributed or otherwise used for commercial purposes  All illustrations and images included in CareNotes® are the copyrighted property of A D A M , Inc  or [address] [place], [place] [address]   The above information is an  only  It is not intended as medical advice for individual conditions or treatments  Talk to your doctor, nurse or pharmacist before following any medical regimen to see if it is safe and effective for you  Follow up with PCP in 3-5 days  Proceed to  ER if symptoms worsen  Chief Complaint     Chief Complaint   Patient presents with   • Earache     Bilateral ear pain, feels like fluid is in it, no drainage, since this morning         History of Present Illness       Patient is a 74-year-old female past medical history significant for heart murmur, migraine, ventricular septal defect, who presents for evaluation of right ear pain since this morning  She denies fever, cough, body aches, chills, congestion, sore throat  She has not taken any pain medication    She rates the pain as an 8/10     Earache   Pertinent negatives include no coughing, diarrhea, ear discharge, headaches, hearing loss, neck pain, rash, rhinorrhea, sore throat or vomiting  Review of Systems   Review of Systems   Constitutional: Negative for fatigue and fever  HENT: Positive for ear pain  Negative for congestion, ear discharge, hearing loss, postnasal drip, rhinorrhea, sinus pressure, sinus pain, sneezing, sore throat and tinnitus  Eyes: Negative  Negative for pain, discharge, redness and itching  Respiratory: Negative  Negative for apnea, cough, choking, chest tightness, shortness of breath and stridor  Cardiovascular: Negative  Negative for chest pain and palpitations  Gastrointestinal: Negative  Negative for diarrhea, nausea and vomiting  Endocrine: Negative  Negative for polydipsia, polyphagia and polyuria  Genitourinary: Negative  Negative for decreased urine volume and flank pain  Musculoskeletal: Negative  Negative for arthralgias, back pain, myalgias, neck pain and neck stiffness  Skin: Negative  Negative for color change and rash  Allergic/Immunologic: Negative  Negative for environmental allergies  Neurological: Negative  Negative for dizziness, facial asymmetry, light-headedness, numbness and headaches  Hematological: Negative  Negative for adenopathy  Psychiatric/Behavioral: Negative            Current Medications       Current Outpatient Medications:   •  acetaminophen (TYLENOL) 325 mg tablet, Take 2 tablets (650 mg total) by mouth every 4 (four) hours as needed for mild pain or headaches, Disp: 30 tablet, Rfl: 0  •  escitalopram (Lexapro) 10 mg tablet, Take 1 tablet (10 mg total) by mouth daily, Disp: 90 tablet, Rfl: 1  •  ibuprofen (MOTRIN) 600 mg tablet, Take 1 tablet (600 mg total) by mouth every 6 (six) hours as needed (cramping), Disp: 30 tablet, Rfl: 0  •  medroxyPROGESTERone (DEPO-PROVERA) 150 mg/mL injection, Inject 1 mL (150 mg total) into a muscle every 3 (three) months, Disp: 1 mL, Rfl: 1    Current Allergies     Allergies as of 11/08/2022 - Reviewed 11/08/2022   Allergen Reaction Noted   • Penicillins Hives 12/13/2016            The following portions of the patient's history were reviewed and updated as appropriate: allergies, current medications, past family history, past medical history, past social history, past surgical history and problem list      Past Medical History:   Diagnosis Date   • Anxiety    • Heart murmur    • Hyperlipidemia    • Migraine    • VSD (ventricular septal defect and aortic arch hypoplasia        No past surgical history on file  Family History   Problem Relation Age of Onset   • No Known Problems Mother    • Heart disease Father          Medications have been verified  Objective   /67   Pulse 66   Temp 97 8 °F (36 6 °C)   Resp 16   SpO2 97%        Physical Exam     Physical Exam  Vitals and nursing note reviewed  Constitutional:       General: She is not in acute distress  Appearance: Normal appearance  She is toxic-appearing  She is not ill-appearing or diaphoretic  HENT:      Head: Normocephalic and atraumatic  Comments: Discomfort at base of posterior right ear near mandibular angle  Right Ear: Hearing, tympanic membrane, ear canal and external ear normal  There is no impacted cerumen  Tympanic membrane is not erythematous or bulging  Left Ear: Hearing, tympanic membrane, ear canal and external ear normal  There is no impacted cerumen  Tympanic membrane is not erythematous or bulging  Nose: Nose normal  No congestion or rhinorrhea  Mouth/Throat:      Mouth: Mucous membranes are moist       Tongue: No lesions  Palate: No mass  Pharynx: Oropharynx is clear  Uvula midline  No pharyngeal swelling, oropharyngeal exudate, posterior oropharyngeal erythema or uvula swelling  Tonsils: No tonsillar exudate or tonsillar abscesses  1+ on the right  1+ on the left     Eyes: Extraocular Movements: Extraocular movements intact  Conjunctiva/sclera: Conjunctivae normal       Pupils: Pupils are equal, round, and reactive to light  Cardiovascular:      Rate and Rhythm: Normal rate and regular rhythm  Pulses: Normal pulses  Heart sounds: Normal heart sounds  No murmur heard  No friction rub  No gallop  Pulmonary:      Effort: Pulmonary effort is normal  No respiratory distress  Breath sounds: Normal breath sounds  No stridor  No wheezing, rhonchi or rales  Abdominal:      General: Bowel sounds are normal       Palpations: Abdomen is soft  Tenderness: There is no abdominal tenderness  There is no guarding or rebound  Musculoskeletal:         General: Normal range of motion  Cervical back: Normal range of motion and neck supple  No rigidity or tenderness  Lymphadenopathy:      Cervical: No cervical adenopathy  Skin:     General: Skin is warm and dry  Capillary Refill: Capillary refill takes less than 2 seconds  Neurological:      General: No focal deficit present  Mental Status: She is alert and oriented to person, place, and time  Cranial Nerves: No cranial nerve deficit     Psychiatric:         Mood and Affect: Mood normal          Behavior: Behavior normal

## 2022-11-08 NOTE — PATIENT INSTRUCTIONS
Alternate Tylenol and ibuprofen and apply warm compress with gentle massage  Follow-up with primary care provider symptoms do not resolve within 3-5 days

## 2022-11-08 NOTE — LETTER
November 8, 2022     Patient: April Olsen   YOB: 2000   Date of Visit: 11/8/2022       To Whom it May Concern:    April Olsen was seen in my clinic on 11/8/2022  She may return on 11/9/2022  If you have any questions or concerns, please don't hesitate to call           Sincerely,          ANGELA Negron        CC: No Recipients

## 2023-01-04 ENCOUNTER — OFFICE VISIT (OUTPATIENT)
Dept: URGENT CARE | Age: 23
End: 2023-01-04

## 2023-01-04 VITALS
OXYGEN SATURATION: 97 % | HEART RATE: 84 BPM | RESPIRATION RATE: 18 BRPM | SYSTOLIC BLOOD PRESSURE: 102 MMHG | DIASTOLIC BLOOD PRESSURE: 68 MMHG | TEMPERATURE: 97.7 F

## 2023-01-04 DIAGNOSIS — B34.9 VIRAL SYNDROME: Primary | ICD-10-CM

## 2023-01-04 LAB
SARS-COV-2 AG UPPER RESP QL IA: NEGATIVE
VALID CONTROL: NORMAL

## 2023-01-04 RX ORDER — CYCLOBENZAPRINE HCL 10 MG
TABLET ORAL
COMMUNITY
Start: 2022-11-09

## 2023-01-04 RX ORDER — BROMPHENIRAMINE MALEATE, PSEUDOEPHEDRINE HYDROCHLORIDE, AND DEXTROMETHORPHAN HYDROBROMIDE 2; 30; 10 MG/5ML; MG/5ML; MG/5ML
5 SYRUP ORAL 4 TIMES DAILY PRN
Qty: 120 ML | Refills: 0 | Status: SHIPPED | OUTPATIENT
Start: 2023-01-04

## 2023-01-04 NOTE — LETTER
January 4, 2023     Patient: Arlene Cordova   YOB: 2000   Date of Visit: 1/4/2023       To Whom it May Concern:    Arlene Cordova was seen in my clinic on 1/4/2023  She  May return on 1/06/2022  If you have any questions or concerns, please don't hesitate to call           Sincerely,          ANGELA Walter        CC: No Recipients

## 2023-01-04 NOTE — PROGRESS NOTES
[facility]        NAME: Rudy Mercer is a 23 y o  female  : 2000    MRN: [id number]  DATE: 2023  TIME: 9:48 AM    Assessment and Plan   Viral syndrome [B34 9]  1  Viral syndrome  Poct Covid 19 Rapid Antigen Test    Covid/Flu-Office Collect    brompheniramine-pseudoephedrine-DM 30-2-10 MG/5ML syrup      Rapid COVID-negative, will send COVID/flu cultures  Bromfed for cough and congestion  Patient advised to stay well-hydrated, trial humidifier for congestion  Follow-up with primary care provider if symptoms do not resolve within 1 to 2 weeks  Patient Instructions   Viral Syndrome   WHAT YOU NEED TO KNOW:   Viral syndrome is a term used for symptoms of an infection caused by a virus  Viruses are spread easily from person to person through the air and on shared items  DISCHARGE INSTRUCTIONS:   Call your local emergency number (911 in the [address] [place] [address],[address]) or have someone else call if:   • You have a seizure      • You cannot be woken      • You have chest pain or trouble breathing      Return to the emergency department if:   • You have a stiff neck, a bad headache, and sensitivity to light      • You feel weak, dizzy, or confused      • You stop urinating or urinate a lot less than usual      • You cough up blood or thick yellow or green mucus      • You have severe abdominal pain or your abdomen is larger than usual      Call your doctor if:   • Your symptoms do not get better with treatment or get worse after 3 days      • You have a rash or ear pain      • You have burning when you urinate      • You have questions or concerns about your condition or care      Medicines: You may  need any of the following:  • Acetaminophen  decreases pain and fever  It is available without a doctor's order  Ask how much to take and how often to take it  Follow directions   Read the labels of all other medicines you are using to see if they also contain acetaminophen, or ask your doctor or pharmacist  Acetaminophen can cause liver damage if not taken correctly  Do not use more than 4 grams (4,000 milligrams) total of acetaminophen in one day       • NSAIDs , such as ibuprofen, help decrease swelling, pain, and fever  NSAIDs can cause stomach bleeding or kidney problems in certain people  If you take blood thinner medicine, always ask your healthcare provider if NSAIDs are safe for you  Always read the medicine label and follow directions      • Cold medicine  helps decrease swelling, control a cough, and relieve chest or nasal congestion       • Saline nasal spray  helps decrease nasal congestion       • Take your medicine as directed  Contact your healthcare provider if you think your medicine is not helping or if you have side effects  Tell him of her if you are allergic to any medicine  Keep a list of the medicines, vitamins, and herbs you take  Include the amounts, and when and why you take them  Bring the list or the pill bottles to follow-up visits  Carry your medicine list with you in case of an emergency      Manage your symptoms:   • Drink liquids as directed to prevent dehydration  Ask how much liquid to drink each day and which liquids are best for you  Ask if you should drink an oral rehydration solution (ORS)  An ORS has the right amounts of water, salts, and sugar you need to replace body fluids  This may help prevent dehydration caused by vomiting or diarrhea  Do not drink liquids with caffeine  Liquids with caffeine can make dehydration worse      • Get plenty of rest to help your body heal   Take naps throughout the day  Ask your healthcare provider when you can return to work and your normal activities      • Use a cool mist humidifier to help you breathe easier  Ask your healthcare provider how to use a cool mist humidifier      • Eat honey or use cough drops for a sore throat  Cough drops are available without a doctor's order   Follow directions for taking cough drops      • Do not smoke or be close to anyone who is smoking  Nicotine and other chemicals in cigarettes and cigars can cause lung damage  Smoking can also delay healing  Ask your healthcare provider for information if you currently smoke and need help to quit  E-cigarettes or smokeless tobacco still contain nicotine  Talk to your healthcare provider before you use these products      Prevent the spread of germs:       • Wash your hands often  Wash your hands several times each day  Wash after you use the bathroom, change a child's diaper, and before you prepare or eat food  Use soap and water every time  Rub your soapy hands together, lacing your fingers  Wash the front and back of your hands, and in between your fingers  Use the fingers of one hand to scrub under the fingernails of the other hand  Wash for at least 20 seconds  Rinse with warm, running water for several seconds  Then dry your hands with a clean towel or paper towel  Use hand  that contains alcohol if soap and water are not available  Do not touch your eyes, nose, or mouth without washing your hands first           • Cover a sneeze or cough  Use a tissue that covers your mouth and nose  Throw the tissue away in a trash can right away  Use the bend of your arm if a tissue is not available  Wash your hands well with soap and water or use a hand       • Stay away from others while you are sick  Avoid crowds as much as possible      • Ask about vaccines you may need  Talk to your healthcare provider about your vaccine history  He or she will tell you which vaccines you need, and when to get them      ? Get the influenza (flu) vaccine as soon as recommended each year  The flu vaccine is available starting in September or October  Flu viruses change, so it is important to get a flu vaccine every year      ? Get the pneumonia vaccine if recommended  This vaccine is usually recommended every 5 years   Your provider will tell you when to get this vaccine, if needed      Follow up with your doctor as directed:  Write down your questions so you remember to ask them during your visits  © Copyright [employer] 2022 Information is for End User's use only and may not be sold, redistributed or otherwise used for commercial purposes  All illustrations and images included in CareNotes® are the copyrighted property of [employer] M [employer] Inc  or [employer]  The above information is an  only  It is not intended as medical advice for individual conditions or treatments  Talk to your doctor, nurse or pharmacist before following any medical regimen to see if it is safe and effective for you  Follow up with PCP in 3-5 days  Proceed to  ER if symptoms worsen  Chief Complaint     Chief Complaint   Patient presents with   • Sore Throat   • Generalized Body Aches   • Cough     Patient been sick for 1 day with her symptoms         History of Present Illness       Patient is a 53-year-old female with past medical history significant for heart murmur, ventricular septal defect, who presents for evaluation of body aches, sore throat, cough and congestion since yesterday  She also reports some diarrhea  She did have a recent positive COVID exposure  She denies fever, nausea/vomiting, neck pain or stiffness, chest pain or palpitations  While in office she did experience an episode of chest tightness  Sore Throat   Associated symptoms include congestion and coughing  Pertinent negatives include no diarrhea, ear discharge, ear pain, headaches, neck pain, shortness of breath, stridor or vomiting  Generalized Body Aches  Associated symptoms include congestion, a sore throat and coughing  Pertinent negatives include no ear discharge, ear pain, eye discharge, eye itching, eye pain, eye redness, headaches, rhinorrhea, stridor, fatigue, fever, chest pain, shortness of breath, wheezing, diarrhea, nausea, vomiting, neck pain or rash     Cough  Associated symptoms include myalgias and a sore throat  Pertinent negatives include no chest pain, ear pain, eye redness, fever, headaches, postnasal drip, rash, rhinorrhea, shortness of breath or wheezing  There is no history of environmental allergies  Review of Systems   Review of Systems   Constitutional: Negative for fatigue and fever  HENT: Positive for congestion and sore throat  Negative for ear discharge, ear pain, postnasal drip, rhinorrhea, sinus pressure, sinus pain and sneezing  Eyes: Negative  Negative for pain, discharge, redness and itching  Respiratory: Positive for cough  Negative for apnea, choking, chest tightness, shortness of breath, wheezing and stridor  Cardiovascular: Negative  Negative for chest pain and palpitations  Gastrointestinal: Negative  Negative for diarrhea, nausea and vomiting  Endocrine: Negative  Negative for polydipsia, polyphagia and polyuria  Genitourinary: Negative  Negative for decreased urine volume and flank pain  Musculoskeletal: Positive for myalgias  Negative for arthralgias, back pain, neck pain and neck stiffness  Skin: Negative  Negative for color change and rash  Allergic/Immunologic: Negative  Negative for environmental allergies  Neurological: Negative  Negative for dizziness, facial asymmetry, light-headedness, numbness and headaches  Hematological: Negative  Negative for adenopathy  Psychiatric/Behavioral: Negative            Current Medications       Current Outpatient Medications:   •  brompheniramine-pseudoephedrine-DM 30-2-10 MG/5ML syrup, Take 5 mL by mouth 4 (four) times a day as needed for congestion, cough or allergies, Disp: 120 mL, Rfl: 0  •  acetaminophen (TYLENOL) 325 mg tablet, Take 2 tablets (650 mg total) by mouth every 4 (four) hours as needed for mild pain or headaches, Disp: 30 tablet, Rfl: 0  •  cyclobenzaprine (FLEXERIL) 10 mg tablet, TAKE 1 TABLET BY MOUTH EVERY DAY NIGHTLY AS NEEDED FOR MUSCLE SPASMS, Disp: , Rfl:   • escitalopram (Lexapro) 10 mg tablet, Take 1 tablet (10 mg total) by mouth daily, Disp: 90 tablet, Rfl: 1  •  ibuprofen (MOTRIN) 600 mg tablet, Take 1 tablet (600 mg total) by mouth every 6 (six) hours as needed (cramping), Disp: 30 tablet, Rfl: 0  •  medroxyPROGESTERone (DEPO-PROVERA) 150 mg/mL injection, Inject 1 mL (150 mg total) into a muscle every 3 (three) months, Disp: 1 mL, Rfl: 1    Current Allergies     Allergies as of 01/04/2023 - Reviewed 01/04/2023   Allergen Reaction Noted   • Penicillins Hives 12/13/2016            The following portions of the patient's history were reviewed and updated as appropriate: allergies, current medications, past family history, past medical history, past social history, past surgical history and problem list      Past Medical History:   Diagnosis Date   • Anxiety    • Heart murmur    • Hyperlipidemia    • Migraine    • VSD (ventricular septal defect and aortic arch hypoplasia        No past surgical history on file  Family History   Problem Relation Age of Onset   • No Known Problems Mother    • Heart disease Father          Medications have been verified  Objective   /68 (BP Location: Right arm, Patient Position: Sitting, Cuff Size: Standard)   Pulse 84   Temp 97 7 °F (36 5 °C)   Resp 18   SpO2 97%        Physical Exam     Physical Exam  Vitals and nursing note reviewed  Constitutional:       General: She is not in acute distress  Appearance: Normal appearance  She is not ill-appearing, toxic-appearing or diaphoretic  Interventions: She is not intubated  HENT:      Head: Normocephalic and atraumatic  Right Ear: Tympanic membrane and ear canal normal  No drainage, swelling or tenderness  No middle ear effusion  Tympanic membrane is not erythematous  Left Ear: Tympanic membrane and ear canal normal  No drainage, swelling or tenderness  No middle ear effusion  Tympanic membrane is not erythematous        Nose: Nose normal  No congestion or rhinorrhea  Mouth/Throat:      Mouth: Mucous membranes are moist  No oral lesions  Pharynx: Uvula midline  No pharyngeal swelling, oropharyngeal exudate, posterior oropharyngeal erythema or uvula swelling  Tonsils: No tonsillar exudate or tonsillar abscesses  0 on the right  0 on the left  Eyes:      Extraocular Movements: Extraocular movements intact  Conjunctiva/sclera: Conjunctivae normal       Pupils: Pupils are equal, round, and reactive to light  Neck:      Thyroid: No thyromegaly  Cardiovascular:      Rate and Rhythm: Normal rate and regular rhythm  Pulses: Normal pulses  Heart sounds: S1 normal and S2 normal  Heart sounds not distant  Murmur heard  No friction rub  No gallop  Pulmonary:      Effort: Pulmonary effort is normal  No tachypnea, bradypnea, accessory muscle usage, prolonged expiration, respiratory distress or retractions  She is not intubated  Breath sounds: Normal breath sounds  No stridor, decreased air movement or transmitted upper airway sounds  No decreased breath sounds, wheezing, rhonchi or rales  Abdominal:      General: Bowel sounds are normal       Palpations: Abdomen is soft  Tenderness: There is no abdominal tenderness  There is no guarding or rebound  Musculoskeletal:         General: Normal range of motion  Cervical back: Normal range of motion and neck supple  No tenderness  Lymphadenopathy:      Cervical: No cervical adenopathy  Skin:     General: Skin is warm and dry  Capillary Refill: Capillary refill takes less than 2 seconds  Neurological:      General: No focal deficit present  Mental Status: She is alert and oriented to person, place, and time  Cranial Nerves: No cranial nerve deficit     Psychiatric:         Mood and Affect: Mood normal          Behavior: Behavior normal

## 2023-01-05 LAB
FLUAV RNA RESP QL NAA+PROBE: NEGATIVE
FLUBV RNA RESP QL NAA+PROBE: NEGATIVE
SARS-COV-2 RNA RESP QL NAA+PROBE: NEGATIVE

## 2023-01-06 ENCOUNTER — HOSPITAL ENCOUNTER (EMERGENCY)
Facility: HOSPITAL | Age: 23
Discharge: HOME | End: 2023-01-06
Attending: EMERGENCY MEDICINE

## 2023-01-06 VITALS
SYSTOLIC BLOOD PRESSURE: 126 MMHG | HEART RATE: 99 BPM | OXYGEN SATURATION: 98 % | RESPIRATION RATE: 18 BRPM | DIASTOLIC BLOOD PRESSURE: 65 MMHG | TEMPERATURE: 99 F

## 2023-01-06 DIAGNOSIS — J06.9 VIRAL URI WITH COUGH: ICD-10-CM

## 2023-01-06 DIAGNOSIS — U07.1 COVID-19: Primary | ICD-10-CM

## 2023-01-06 RX ORDER — IBUPROFEN 600 MG/1
600 TABLET ORAL EVERY 6 HOURS PRN
Qty: 30 TABLET | Refills: 0 | Status: SHIPPED | OUTPATIENT
Start: 2023-01-06

## 2023-01-06 RX ORDER — ACETAMINOPHEN 325 MG/1
975 TABLET ORAL 3 TIMES DAILY PRN
Qty: 30 TABLET | Refills: 0 | Status: SHIPPED | OUTPATIENT
Start: 2023-01-06

## 2023-01-06 RX ORDER — BENZONATATE 100 MG/1
100 CAPSULE ORAL EVERY 8 HOURS
Qty: 21 CAPSULE | Refills: 0 | Status: SHIPPED | OUTPATIENT
Start: 2023-01-06

## 2023-01-06 NOTE — Clinical Note
Alma Marrero was seen and treated in our emergency department on 1/6/2023  Diagnosis:     Moises Mcallister  is off the rest of the shift today, may return to work on return date  She may return on this date: 01/09/2023    Or when without fever for 24 hours without the use of Acetaminophen or Ibuprofen  If you have any questions or concerns, please don't hesitate to call        Kristin Penaloza    ______________________________           _______________          _______________  Hospital Representative                              Date                                Time

## 2023-01-06 NOTE — DISCHARGE INSTRUCTIONS
Call your primary care provider to schedule an appointment in the next 2 to 3 days  Use the prescribed Tessalon Perles as needed for your dry, irritative cough  If your cough becomes more productive or wet, you may trial over-the-counter Mucinex which helps bring up and expel the mucus  Use the prescribed ibuprofen and Tylenol as needed for fever and complaint of pain  Use warm liquids, honey, and cold liquids to soothe your throat  Return to the ER if persistent fevers, inability tolerate fluids, vomiting, chest pain, difficulty breathing, weakness, or lethargy

## 2023-01-06 NOTE — ED PROVIDER NOTES
History  Chief Complaint   Patient presents with   • Cough     Pt reports exposure to covid on Monday, fever today with cough and congestion  Patient is a 29-year-old female with PMH of heart murmur presenting for evaluation of nasal congestion, dry cough, and fever  Patient notes exposure to a COVID-positive friend 4 days ago express concern for COVID  She notes that her and her son developed URI symptoms since that time  She notes fever with T-max of 101F orally for which she has been taking Tylenol  She endorses nasal congestion, sore throat, painful swallowing, and nonproductive cough  She denies associated headache, vision changes, difficulty swallowing, chest pain, shortness of breath, abdominal pain, N/V/D, urinary complaints, skin changes, leg swelling        History provided by:  Patient   used: No    URI  Presenting symptoms: congestion, cough, fatigue, fever and sore throat    Presenting symptoms: no ear pain, no facial pain and no rhinorrhea    Congestion:     Location:  Nasal    Interferes with sleep: no      Interferes with eating/drinking: no    Cough:     Cough characteristics:  Dry    Sputum characteristics:  Unable to specify    Severity:  Moderate    Onset quality:  Unable to specify    Duration:  1 day    Timing:  Intermittent    Progression:  Unchanged    Chronicity:  New  Fatigue:     Severity:  Moderate    Duration:  1 day    Timing:  Constant    Progression:  Unchanged  Fever:     Duration:  1 day    Timing:  Intermittent    Max temp prior to arrival:  101    Temp source:  Oral    Progression:  Unable to specify  Sore throat:     Severity:  Moderate    Onset quality:  Unable to specify    Duration:  1 day    Timing:  Constant    Progression:  Unchanged  Severity:  Moderate  Onset quality:  Unable to specify  Duration:  1 day  Timing:  Constant  Progression:  Unchanged  Chronicity:  New  Relieved by:  Nothing  Worsened by:  Drinking and eating  Ineffective treatments:  Rest  Associated symptoms: no headaches    Risk factors: sick contacts    Risk factors: not elderly, no chronic kidney disease, no chronic respiratory disease, no diabetes mellitus, no immunosuppression and no recent illness        Prior to Admission Medications   Prescriptions Last Dose Informant Patient Reported? Taking?   acetaminophen (TYLENOL) 325 mg tablet   No No   Sig: Take 2 tablets (650 mg total) by mouth every 4 (four) hours as needed for mild pain or headaches   brompheniramine-pseudoephedrine-DM 30-2-10 MG/5ML syrup   No No   Sig: Take 5 mL by mouth 4 (four) times a day as needed for congestion, cough or allergies   cyclobenzaprine (FLEXERIL) 10 mg tablet   Yes No   Sig: TAKE 1 TABLET BY MOUTH EVERY DAY NIGHTLY AS NEEDED FOR MUSCLE SPASMS   escitalopram (Lexapro) 10 mg tablet   No No   Sig: Take 1 tablet (10 mg total) by mouth daily   ibuprofen (MOTRIN) 600 mg tablet   No No   Sig: Take 1 tablet (600 mg total) by mouth every 6 (six) hours as needed (cramping)   medroxyPROGESTERone (DEPO-PROVERA) 150 mg/mL injection   No No   Sig: Inject 1 mL (150 mg total) into a muscle every 3 (three) months      Facility-Administered Medications: None       Past Medical History:   Diagnosis Date   • Anxiety    • Heart murmur    • Hyperlipidemia    • Migraine    • VSD (ventricular septal defect and aortic arch hypoplasia        History reviewed  No pertinent surgical history  Family History   Problem Relation Age of Onset   • No Known Problems Mother    • Heart disease Father      I have reviewed and agree with the history as documented      E-Cigarette/Vaping   • E-Cigarette Use Current Every Day User      E-Cigarette/Vaping Substances   • Nicotine Yes    • THC No    • CBD No    • Flavoring Yes    • Other No    • Unknown No      Social History     Tobacco Use   • Smoking status: Never   • Smokeless tobacco: Never   • Tobacco comments:     vaping   Vaping Use   • Vaping Use: Every day   • Substances: Nicotine, Flavoring   Substance Use Topics   • Alcohol use: No   • Drug use: No       Review of Systems   Constitutional: Positive for fatigue and fever  Negative for appetite change and chills  HENT: Positive for congestion and sore throat  Negative for ear pain, rhinorrhea and trouble swallowing  Respiratory: Positive for cough  Negative for shortness of breath  Cardiovascular: Negative for chest pain and palpitations  Gastrointestinal: Negative for abdominal pain, diarrhea, nausea and vomiting  Musculoskeletal: Negative for back pain and gait problem  Neurological: Negative for dizziness, light-headedness and headaches  All other systems reviewed and are negative  Physical Exam  Physical Exam  Vitals and nursing note reviewed  Constitutional:       General: She is not in acute distress  Appearance: Normal appearance  She is normal weight  She is ill-appearing  She is not toxic-appearing or diaphoretic  HENT:      Head: Normocephalic and atraumatic  Jaw: There is normal jaw occlusion  Nose: Nose normal       Mouth/Throat:      Lips: Pink  No lesions  Mouth: Mucous membranes are moist       Pharynx: Oropharynx is clear  Uvula midline  Posterior oropharyngeal erythema present  No pharyngeal swelling, oropharyngeal exudate or uvula swelling  Tonsils: No tonsillar exudate or tonsillar abscesses  0 on the right  0 on the left  Eyes:      Extraocular Movements: Extraocular movements intact  Conjunctiva/sclera: Conjunctivae normal    Neck:      Trachea: Trachea and phonation normal  No abnormal tracheal secretions  Cardiovascular:      Rate and Rhythm: Normal rate  Pulses:           Radial pulses are 2+ on the right side and 2+ on the left side  Dorsalis pedis pulses are 2+ on the right side and 2+ on the left side        Heart sounds: Normal heart sounds, S1 normal and S2 normal    Pulmonary:      Effort: Pulmonary effort is normal  No tachypnea or respiratory distress  Breath sounds: Normal breath sounds and air entry  No stridor, decreased air movement or transmitted upper airway sounds  No decreased breath sounds  Musculoskeletal:         General: Normal range of motion  Cervical back: Full passive range of motion without pain, normal range of motion and neck supple  Right lower leg: No edema  Left lower leg: No edema  Skin:     General: Skin is warm and dry  Capillary Refill: Capillary refill takes less than 2 seconds  Neurological:      General: No focal deficit present  Mental Status: She is alert and oriented to person, place, and time  Mental status is at baseline  GCS: GCS eye subscore is 4  GCS verbal subscore is 5  GCS motor subscore is 6  Vital Signs  ED Triage Vitals [01/06/23 0033]   Temperature Pulse Respirations Blood Pressure SpO2   99 °F (37 2 °C) 99 18 126/65 98 %      Temp Source Heart Rate Source Patient Position - Orthostatic VS BP Location FiO2 (%)   Oral Monitor -- -- --      Pain Score       --           Vitals:    01/06/23 0033   BP: 126/65   Pulse: 99         Visual Acuity      ED Medications  Medications - No data to display    Diagnostic Studies  Results Reviewed     None                 No orders to display              Procedures  Procedures         ED Course                                             Medical Decision Making  DDx including but not limited to: URI, bronchitis, COVID 19, Flu A, Flu B, RSV, pharyngitis    Patient is a 22-year-old female presenting for evaluation of 1 day of URI symptoms  She presents with her son who was swabbed and tested positive for COVID-19  I offered her COVID/flu/RSV swab, however she defers  Given her symptomology is similar in alignment with her son and that they both were exposed to COVID-19, I suspect COVID-19 as a likely source of her mild URI symptoms  Her vital signs are stable  Her physical exam is benign    Plan made for discharged home with supportive therapy  Patient educated on use of honey based liquids, honey lozenges, warm and cool fluids for throat comfort, Tylenol and ibuprofen for pain/fever control, and strict return precautions  Patient agreeable plan and has no further questions at this time  COVID-19: acute illness or injury  Viral URI with cough: acute illness or injury  Risk  OTC drugs  Prescription drug management  Disposition  Final diagnoses:   COVID-19   Viral URI with cough     Time reflects when diagnosis was documented in both MDM as applicable and the Disposition within this note     Time User Action Codes Description Comment    1/6/2023  2:58 AM Sadie Paulson Add [U07 1] COVID-19     1/6/2023  2:58 AM Will Quezada Add [J06 9] Viral URI with cough       ED Disposition     ED Disposition   Discharge    Condition   Stable    Date/Time   Fri Jan 6, 2023  2:58 AM    Comment   Paramjit Griffith discharge to home/self care                 Follow-up Information     Follow up With Specialties Details Why Contact Info Additional Information    Romain Hernández MD Family Medicine Schedule an appointment as soon as possible for a visit in 3 days  [address] [address] Emergency Department Emergency Medicine Go to  If symptoms worsen [address] [facility] [id number] [place] [address] Emergency Department, [address], [place], [place], [address]          Discharge Medication List as of 1/6/2023  3:01 AM      START taking these medications    Details   !! acetaminophen (TYLENOL) 325 mg tablet Take 3 tablets (975 mg total) by mouth 3 (three) times a day as needed for mild pain or fever, Starting Fri 1/6/2023, Normal      benzonatate (TESSALON PERLES) 100 mg capsule Take 1 capsule (100 mg total) by mouth every 8 (eight) hours, Starting Fri 1/6/2023, Normal      !! ibuprofen (MOTRIN) 600 mg tablet Take 1 tablet (600 mg total) by mouth every 6 (six) hours as needed for mild pain, Starting Fri 1/6/2023, Normal       !! - Potential duplicate medications found  Please discuss with provider  CONTINUE these medications which have NOT CHANGED    Details   !! acetaminophen (TYLENOL) 325 mg tablet Take 2 tablets (650 mg total) by mouth every 4 (four) hours as needed for mild pain or headaches, Starting Wed 11/3/2021, Normal      brompheniramine-pseudoephedrine-DM 30-2-10 MG/5ML syrup Take 5 mL by mouth 4 (four) times a day as needed for congestion, cough or allergies, Starting Wed 1/4/2023, Normal      cyclobenzaprine (FLEXERIL) 10 mg tablet TAKE 1 TABLET BY MOUTH EVERY DAY NIGHTLY AS NEEDED FOR MUSCLE SPASMS, Historical Med      escitalopram (Lexapro) 10 mg tablet Take 1 tablet (10 mg total) by mouth daily, Starting Thu 9/1/2022, Normal      !! ibuprofen (MOTRIN) 600 mg tablet Take 1 tablet (600 mg total) by mouth every 6 (six) hours as needed (cramping), Starting Wed 11/3/2021, Normal      medroxyPROGESTERone (DEPO-PROVERA) 150 mg/mL injection Inject 1 mL (150 mg total) into a muscle every 3 (three) months, Starting Mon 10/17/2022, Normal       !! - Potential duplicate medications found  Please discuss with provider  No discharge procedures on file      PDMP Review     None          ED Provider  Electronically Signed by           Kedar Salguero  01/06/23 9451

## 2023-01-06 NOTE — ED NOTES
Pt not tested for covid d/t alert Pt had 2 negative tests in past 5 days        Laina León RN  01/06/23 0765

## 2023-01-18 ENCOUNTER — CLINICAL SUPPORT (OUTPATIENT)
Dept: FAMILY MEDICINE CLINIC | Facility: CLINIC | Age: 23
End: 2023-01-18

## 2023-01-18 DIAGNOSIS — Z11.52 ENCOUNTER FOR SCREENING FOR COVID-19: Primary | ICD-10-CM

## 2023-01-18 DIAGNOSIS — Z30.013 ENCOUNTER FOR INITIAL PRESCRIPTION OF INJECTABLE CONTRACEPTIVE: ICD-10-CM

## 2023-01-18 DIAGNOSIS — Z30.42 ENCOUNTER FOR MANAGEMENT AND INJECTION OF INJECTABLE PROGESTIN CONTRACEPTIVE: ICD-10-CM

## 2023-01-18 LAB
SARS-COV-2 AG UPPER RESP QL IA: NEGATIVE
VALID CONTROL: NORMAL

## 2023-01-18 RX ORDER — MEDROXYPROGESTERONE ACETATE 150 MG/ML
150 INJECTION, SUSPENSION INTRAMUSCULAR ONCE
Status: COMPLETED | OUTPATIENT
Start: 2023-01-18 | End: 2023-01-18

## 2023-01-18 RX ADMIN — MEDROXYPROGESTERONE ACETATE 150 MG: 150 INJECTION, SUSPENSION INTRAMUSCULAR at 11:53

## 2023-03-24 DIAGNOSIS — Z30.42 ENCOUNTER FOR SURVEILLANCE OF INJECTABLE CONTRACEPTIVE: ICD-10-CM

## 2023-03-25 RX ORDER — MEDROXYPROGESTERONE ACETATE 150 MG/ML
150 INJECTION, SUSPENSION INTRAMUSCULAR
Qty: 1 ML | Refills: 1 | Status: SHIPPED | OUTPATIENT
Start: 2023-03-25

## 2023-03-29 DIAGNOSIS — F41.8 DEPRESSION WITH ANXIETY: ICD-10-CM

## 2023-03-30 RX ORDER — ESCITALOPRAM OXALATE 10 MG/1
TABLET ORAL
Qty: 90 TABLET | Refills: 1 | Status: SHIPPED | OUTPATIENT
Start: 2023-03-30

## 2023-06-01 ENCOUNTER — HOSPITAL ENCOUNTER (EMERGENCY)
Facility: HOSPITAL | Age: 23
Discharge: HOME | End: 2023-06-02
Attending: EMERGENCY MEDICINE
Payer: COMMERCIAL

## 2023-06-01 VITALS
DIASTOLIC BLOOD PRESSURE: 88 MMHG | BODY MASS INDEX: 20.84 KG/M2 | RESPIRATION RATE: 18 BRPM | WEIGHT: 103.17 LBS | SYSTOLIC BLOOD PRESSURE: 137 MMHG | OXYGEN SATURATION: 99 % | TEMPERATURE: 98.3 F | HEART RATE: 75 BPM

## 2023-06-01 DIAGNOSIS — R25.2 MUSCLE CRAMPS: Primary | ICD-10-CM

## 2023-06-01 PROCEDURE — 99283 EMERGENCY DEPT VISIT LOW MDM: CPT

## 2023-06-01 NOTE — Clinical Note
Gillian Maxine was seen and treated in our emergency department on 6/1/2023  No restrictions            Diagnosis:     Carlo Olea  may return to work on return date  She may return on this date: 06/04/2023         If you have any questions or concerns, please don't hesitate to call        Nilsa De Dios MD    ______________________________           _______________          _______________  [facility] [place]                              Date                                Time

## 2023-06-02 LAB
ANION GAP SERPL CALCULATED.3IONS-SCNC: 9 MMOL/L (ref 4–13)
BUN SERPL-MCNC: 10 MG/DL (ref 5–25)
CALCIUM SERPL-MCNC: 9.2 MG/DL (ref 8.4–10.2)
CHLORIDE SERPL-SCNC: 109 MMOL/L (ref 96–108)
CO2 SERPL-SCNC: 22 MMOL/L (ref 21–32)
CREAT SERPL-MCNC: 0.68 MG/DL (ref 0.6–1.3)
GFR SERPL CREATININE-BSD FRML MDRD: 125 ML/MIN/1.73SQ M
GLUCOSE SERPL-MCNC: 78 MG/DL (ref 65–140)
POTASSIUM SERPL-SCNC: 3.5 MMOL/L (ref 3.5–5.3)
SODIUM SERPL-SCNC: 140 MMOL/L (ref 135–147)

## 2023-06-02 PROCEDURE — 80048 BASIC METABOLIC PNL TOTAL CA: CPT

## 2023-06-02 PROCEDURE — 36415 COLL VENOUS BLD VENIPUNCTURE: CPT

## 2023-06-02 RX ORDER — IBUPROFEN 400 MG/1
400 TABLET ORAL ONCE
Status: DISCONTINUED | OUTPATIENT
Start: 2023-06-02 | End: 2023-06-02 | Stop reason: HOSPADM

## 2023-06-02 RX ORDER — ACETAMINOPHEN 325 MG/1
975 TABLET ORAL ONCE
Status: COMPLETED | OUTPATIENT
Start: 2023-06-02 | End: 2023-06-02

## 2023-06-02 RX ADMIN — ACETAMINOPHEN 975 MG: 325 TABLET ORAL at 02:11

## 2023-06-02 NOTE — DISCHARGE INSTRUCTIONS
Return sooner to the Emergency Department if increased pain, swelling, numbness, weakness, fever, redness, vomiting  Please follow-up with your primary care provider for continued evaluation of your lower extremity cramping  You can utilize supplementation of magnesium as well as proper fluid hydration at home for continued evaluation and management of your symptoms

## 2023-06-02 NOTE — ED ATTENDING ATTESTATION
6/1/2023  IElvin DO, saw and evaluated the patient  I have discussed the patient with the resident/non-physician practitioner and agree with the resident's/non-physician practitioner's findings, Plan of Care, and MDM as documented in the resident's/non-physician practitioner's note, except where noted  All available labs and Radiology studies were reviewed  I was present for key portions of any procedure(s) performed by the resident/non-physician practitioner and I was immediately available to provide assistance  At this point I agree with the current assessment done in the Emergency Department  I have conducted an independent evaluation of this patient a history and physical is as follows:          19yo female presents with bilateral leg cramping and pain  ,  Describes it as sharp, achy, started suddenly, was laying in bed at the time  No preceding symptoms earlier in the day, normal day of being with her child, no strenuous activity, may be some decreased appetite and solids and liquids today, no excessive urination no excessive defecation, no falls no injury no trauma  No personal or family history of DVT or PE  Review of Systems   Constitutional: Negative for activity change, chills, diaphoresis and fever  HENT: Negative for congestion, sinus pressure and sore throat  Eyes: Negative for pain and visual disturbance  Respiratory: Negative for cough, chest tightness, shortness of breath, wheezing and stridor  Cardiovascular: Negative for chest pain and palpitations  Gastrointestinal: Negative for abdominal distention, abdominal pain, constipation, diarrhea, nausea and vomiting  Genitourinary: Negative for dysuria and frequency  Musculoskeletal: Negative for neck pain and neck stiffness  Skin: Negative for rash  Neurological: Negative for dizziness, speech difficulty, light-headedness, numbness and headaches  Physical Exam  Vitals reviewed     Constitutional: General: She is not in acute distress  Appearance: She is well-developed  She is not diaphoretic  HENT:      Head: Normocephalic and atraumatic  Right Ear: External ear normal       Left Ear: External ear normal       Nose: Nose normal    Eyes:      General:         Right eye: No discharge  Left eye: No discharge  Pupils: Pupils are equal, round, and reactive to light  Neck:      Trachea: No tracheal deviation  Cardiovascular:      Rate and Rhythm: Normal rate and regular rhythm  Heart sounds: Normal heart sounds  No murmur heard  Pulmonary:      Effort: Pulmonary effort is normal  No respiratory distress  Breath sounds: Normal breath sounds  No stridor  Abdominal:      General: There is no distension  Palpations: Abdomen is soft  Tenderness: There is no abdominal tenderness  There is no guarding or rebound  Musculoskeletal:         General: Tenderness present  Normal range of motion  Cervical back: Normal range of motion and neck supple  Right lower leg: No edema  Left lower leg: No edema  Comments:     Patient with diffuse leg tenderness not localized over the calf, exactly from the knee distal all the way down to the tips of her toes, no skin discoloration, normal sensation normal posterior tibial and dorsalis pedis pulses  Skin:     General: Skin is warm and dry  Coloration: Skin is not pale  Findings: No erythema  Neurological:      General: No focal deficit present  Mental Status: She is alert and oriented to person, place, and time                                 ED Course         Critical Care Time  Procedures              Labs Reviewed   BASIC METABOLIC PANEL - Abnormal       Result Value Ref Range Status    Sodium 140  135 - 147 mmol/L Final    Potassium 3 5  3 5 - 5 3 mmol/L Final    Chloride 109 (*) 96 - 108 mmol/L Final    CO2 22  21 - 32 mmol/L Final    ANION GAP 9  4 - 13 mmol/L Final    BUN 10  5 - 25 mg/dL Final    Creatinine 0 68  0 60 - 1 30 mg/dL Final    Comment: Standardized to IDMS reference method    Glucose 78  65 - 140 mg/dL Final    Comment: If the patient is fasting, the ADA then defines impaired fasting glucose as > 100 mg/dL and diabetes as > or equal to 123 mg/dL  Calcium 9 2  8 4 - 10 2 mg/dL Final    eGFR 125  ml/min/1 73sq m Final    Narrative:     [place] guidelines for Chronic Kidney Disease (CKD):   •  Stage 1 with normal or high GFR (GFR > 90 mL/min/1 73 square meters)  •  Stage 2 Mild CKD (GFR = 60-89 mL/min/1 73 square meters)  •  Stage 3A Moderate CKD (GFR = 45-59 mL/min/1 73 square meters)  •  Stage 3B Moderate CKD (GFR = 30-44 mL/min/1 73 square meters)  •  Stage 4 Severe CKD (GFR = 15-29 mL/min/1 73 square meters)  •  Stage 5 End Stage CKD (GFR <15 mL/min/1 73 square meters)  Note: GFR calculation is accurate only with a steady state creatinine               No orders to display             MDM  Number of Diagnoses or Management Options  Muscle cramps: new, needed workup  Diagnosis management comments:       Initial ED assessment: 29-year-old female bilateral leg pain/cramping, started suddenly few hours prior, decreased appetite today diffuse leg tenderness on exam distal to the knee    Initial DDx includes but is not limited to:   Leg cramping, restless leg syndrome, DVT was entertained but sudden onset and bilateral in a patient who has no history of this I think this would be far less likely     No skin irritation or inflammation suggestive of infectious etiology such as cellulitis or shingles  Initial ED plan:    We will check electrolytes, if unremarkable will likely discharge        Final ED summary/disposition:   After evaluation and workup in the emergency department, no evidence of electrolyte abnormality/BMP normal, patient discharged        Amount and/or Complexity of Data Reviewed  Clinical lab tests: ordered and reviewed              Time reflects when diagnosis was documented in both MDM as applicable and the Disposition within this note     Time User Action Codes Description Comment    6/2/2023  2:19 AM Camila Dutton Add [R25 2] Muscle cramps       ED Disposition     ED Disposition   Discharge    Condition   Stable    Date/Time   Fri Jun 2, 2023  2:18 AM    Comment   Carri Carrera discharge to home/self care                 Follow-up Information     Follow up With Specialties Details Why Contact Info Additional Information    Dioni Douglas,  Family Medicine Schedule an appointment as soon as possible for a visit  As needed [address] Emergency Department Emergency Medicine  As needed, If symptoms worsen [address] [facility] [address] [facility] Emergency Department, [address], [place], [place], [address]

## 2023-06-02 NOTE — ED PROVIDER NOTES
History  Chief Complaint   Patient presents with   • Medical Problem     Reports bilateral foot and leg cramping that started 1 5 hours ago  Reports feet looked purple whitish under neath toenails earlier  Scott Tyler is a 71-year-old female who presents to the emergency department with bilateral lower extremity cramping  Patient has taken Advil at home with no improvement in symptomology  Patient is still able to ambulate at her baseline although she expresses that the pain is exacerbated when she attempts to walk and to stretch  Patient was able to drive herself to the emergency department although expressing that accelerating at dorsiflexion/plantarflexion of the foot for working of the pedals exacerbated the discomfort throughout her leg  She was describing that she felt that there was some discoloration of the bilateral big toes on either foot  Denies any discharge  Denies any trauma to the lower extremities, denies any shortness of breath, abdominal pain, loss of consciousness, changes in vision, changes in hearing  No recent long travel or flights, immobilization, or previous history of blood clots  Patient does have a past medical history of ventricular septal defect  This has been evaluated in the outpatient setting before  Patient is denying any other systemic symptoms  Onset of symptoms was approximately 1 5 hours ago  States that the symptoms have remained the same  History provided by:  Patient   used: No        Prior to Admission Medications   Prescriptions Last Dose Informant Patient Reported?  Taking?   acetaminophen (TYLENOL) 325 mg tablet  Self No No   Sig: Take 2 tablets (650 mg total) by mouth every 4 (four) hours as needed for mild pain or headaches   acetaminophen (TYLENOL) 325 mg tablet   No No   Sig: Take 3 tablets (975 mg total) by mouth 3 (three) times a day as needed for mild pain or fever   benzonatate (TESSALON PERLES) 100 mg capsule   No No   Sig: Take 1 capsule (100 mg total) by mouth every 8 (eight) hours   brompheniramine-pseudoephedrine-DM 30-2-10 MG/5ML syrup   No No   Sig: Take 5 mL by mouth 4 (four) times a day as needed for congestion, cough or allergies   cyclobenzaprine (FLEXERIL) 10 mg tablet   Yes No   Sig: TAKE 1 TABLET BY MOUTH EVERY DAY NIGHTLY AS NEEDED FOR MUSCLE SPASMS   escitalopram (LEXAPRO) 10 mg tablet   No No   Sig: TAKE 1 TABLET BY MOUTH EVERY DAY   ibuprofen (MOTRIN) 600 mg tablet  Self No No   Sig: Take 1 tablet (600 mg total) by mouth every 6 (six) hours as needed (cramping)   ibuprofen (MOTRIN) 600 mg tablet   No No   Sig: Take 1 tablet (600 mg total) by mouth every 6 (six) hours as needed for mild pain   medroxyPROGESTERone (DEPO-PROVERA) 150 mg/mL injection   No No   Sig: INJECT 1 ML (150 MG TOTAL) INTO A MUSCLE EVERY 3 (THREE) MONTHS      Facility-Administered Medications: None       Past Medical History:   Diagnosis Date   • Anxiety    • Heart murmur    • Hyperlipidemia    • Migraine    • VSD (ventricular septal defect and aortic arch hypoplasia        History reviewed  No pertinent surgical history  Family History   Problem Relation Age of Onset   • No Known Problems Mother    • Heart disease Father      I have reviewed and agree with the history as documented  E-Cigarette/Vaping   • E-Cigarette Use Current Every Day User      E-Cigarette/Vaping Substances   • Nicotine Yes    • THC No    • CBD No    • Flavoring Yes    • Other No    • Unknown No      Social History     Tobacco Use   • Smoking status: Never   • Smokeless tobacco: Never   • Tobacco comments:     vaping   Vaping Use   • Vaping Use: Every day   • Substances: Nicotine, Flavoring   Substance Use Topics   • Alcohol use: No   • Drug use: No        Review of Systems   Constitutional: Negative for chills and fever  HENT: Negative for ear pain and sore throat  Eyes: Negative for pain and visual disturbance     Respiratory: Negative for cough and shortness of breath  Cardiovascular: Negative for chest pain and palpitations  Gastrointestinal: Negative for abdominal pain and vomiting  Genitourinary: Negative for dysuria and hematuria  Musculoskeletal: Negative for arthralgias and back pain  Skin: Negative for color change and rash  Neurological: Negative for seizures and syncope  All other systems reviewed and are negative  Physical Exam  ED Triage Vitals   Temperature Pulse Respirations Blood Pressure SpO2   06/01/23 2346 06/01/23 2346 06/01/23 2346 06/01/23 2346 06/01/23 2346   98 3 °F (36 8 °C) 75 18 137/88 99 %      Temp Source Heart Rate Source Patient Position - Orthostatic VS BP Location FiO2 (%)   06/01/23 2346 -- -- -- --   Oral          Pain Score       06/02/23 0211       7             Orthostatic Vital Signs  Vitals:    06/01/23 2346   BP: 137/88   Pulse: 75       Physical Exam  Vitals and nursing note reviewed  Constitutional:       General: She is not in acute distress  Appearance: Normal appearance  She is well-developed  She is not ill-appearing or diaphoretic  HENT:      Head: Normocephalic and atraumatic  Right Ear: External ear normal       Left Ear: External ear normal       Nose: Nose normal       Mouth/Throat:      Mouth: Mucous membranes are moist       Pharynx: No oropharyngeal exudate or posterior oropharyngeal erythema  Eyes:      Conjunctiva/sclera: Conjunctivae normal    Cardiovascular:      Rate and Rhythm: Normal rate and regular rhythm  Heart sounds: No murmur heard  Comments: Prominent murmur appreciated on auscultation of the heart  This appears to be consistent with her previously documented physical examination  Pulmonary:      Effort: Pulmonary effort is normal  No respiratory distress  Breath sounds: Normal breath sounds  Abdominal:      Palpations: Abdomen is soft  Tenderness: There is no abdominal tenderness  Musculoskeletal:         General: Tenderness present   No swelling, deformity or signs of injury  Cervical back: Neck supple  Right lower leg: No edema  Left lower leg: No edema  Comments: Diffuse nonlocalized simple tenderness throughout her lower extremities  No unilateral swelling  No overlying redness or erythema  Strong DP pulse  Compartments soft  Neurovascularly intact  Skin:     General: Skin is warm and dry  Capillary Refill: Capillary refill takes less than 2 seconds  Neurological:      General: No focal deficit present  Mental Status: She is alert and oriented to person, place, and time     Psychiatric:         Mood and Affect: Mood normal          ED Medications  Medications   acetaminophen (TYLENOL) tablet 975 mg (975 mg Oral Given 6/2/23 0211)       Diagnostic Studies  Results Reviewed     Procedure Component Value Units Date/Time    Basic metabolic panel [[id number]]  (Abnormal) Collected: 06/02/23 0127    Lab Status: Final result Specimen: Blood from Arm, Right Updated: 06/02/23 0203     Sodium 140 mmol/L      Potassium 3 5 mmol/L      Chloride 109 mmol/L      CO2 22 mmol/L      ANION GAP 9 mmol/L      BUN 10 mg/dL      Creatinine 0 68 mg/dL      Glucose 78 mg/dL      Calcium 9 2 mg/dL      eGFR 125 ml/min/1 73sq m     Narrative:      [place] guidelines for Chronic Kidney Disease (CKD):   •  Stage 1 with normal or high GFR (GFR > 90 mL/min/1 73 square meters)  •  Stage 2 Mild CKD (GFR = 60-89 mL/min/1 73 square meters)  •  Stage 3A Moderate CKD (GFR = 45-59 mL/min/1 73 square meters)  •  Stage 3B Moderate CKD (GFR = 30-44 mL/min/1 73 square meters)  •  Stage 4 Severe CKD (GFR = 15-29 mL/min/1 73 square meters)  •  Stage 5 End Stage CKD (GFR <15 mL/min/1 73 square meters)  Note: GFR calculation is accurate only with a steady state creatinine                 No orders to display         Procedures  Procedures      ED Course                     PERC Rule for PE    Flowsheet Row Most Recent Value   PERC Rule for PE    Age >=50 0 Filed at: 06/02/2023 0053   HR >=100 0 Filed at: 06/02/2023 0053   O2 Sat on room air < 95% 0 Filed at: 06/02/2023 0053   History of PE or DVT 0 Filed at: 06/02/2023 0053   Recent trauma or surgery 0 Filed at: 06/02/2023 0053   Hemoptysis 0 Filed at: 06/02/2023 0053   Exogenous estrogen 1 Filed at: 06/02/2023 0053   Unilateral leg swelling 0 Filed at: 06/02/2023 0053   PERC Rule for PE Results 1 Filed at: 06/02/2023 1973                  Wells' Criteria for PE    Flowsheet Row Most Recent Value   Wells' Criteria for PE    Clinical signs and symptoms of DVT 0 Filed at: 06/02/2023 3814   PE is primary diagnosis or equally likely 0 Filed at: 06/02/2023 0052   HR >100 0 Filed at: 06/02/2023 0052   Immobilization at least 3 days or Surgery in the previous 4 weeks 0 Filed at: 06/02/2023 8540   Previous, objectively diagnosed PE or DVT 0 Filed at: 06/02/2023 0052   Hemoptysis 0 Filed at: 06/02/2023 2004   Malignancy with treatment within 6 months or palliative 0 Filed at: 06/02/2023 8847   Wells' Criteria Total 0 Filed at: 06/02/2023 3244            Medical Decision Making  Patient comes to the emergency department with lower extremity cramping  DDx including but not limited to: sprain, strain, rhabdomyolysis, myositis, fracture, contusion, metabolic abnormality, radiculopathy; doubt DVT or arterial occlusion  Patient had laboratory evaluation conducted for electrolyte abnormalities and there was no metabolic abnormality appreciated  Distribution of discomfort does not appear to be consistent with radiculopathy  No traumatic injury or activity during onset so lower suspicion for sprain, strain, or fracture  No signs or symptoms consistent with contusion  Patient has no signs or symptoms consistent with rhabdomyolysis or myositis    Appears to be consistent with a potential muscle cramping and was counseled on conservative therapy to utilize at home as well as continued follow-up and evaluation by primary care provider in the outpatient setting  Based off of unremarkable vital signs, unremarkable physical examination, and patient able to be ambulatory at her baseline, patient was deemed stable for discharge home  Patient was counseled on potential for etiology of supplementing with magnesium supplements over-the-counter may aid with cramping symptoms in the outpatient setting  Muscle cramps: acute illness or injury  Amount and/or Complexity of Data Reviewed  Labs: ordered  Details: No acute metabolic abnormality appreciated  Risk  OTC drugs  Disposition  Final diagnoses:   Muscle cramps     Time reflects when diagnosis was documented in both MDM as applicable and the Disposition within this note     Time User Action Codes Description Comment    6/2/2023  2:19 AM Jey Smalls Add [R25 2] Muscle cramps       ED Disposition     ED Disposition   Discharge    Condition   Stable    Date/Time   Fri Jun 2, 2023  2:18 AM    Comment   Coral Dobbs discharge to home/self care                 Follow-up Information     Follow up With Specialties Details Why Contact Info Additional Information    Shelia Bullard DO Family Medicine Schedule an appointment as soon as possible for a visit  As needed [address] [place] [address] 56       [facility] Emergency Department Emergency Medicine  As needed, If symptoms worsen [address] [facility] [address] [facility] Emergency Department, [address], [place], [place], [address]          Discharge Medication List as of 6/2/2023  2:19 AM      CONTINUE these medications which have NOT CHANGED    Details   !! acetaminophen (TYLENOL) 325 mg tablet Take 2 tablets (650 mg total) by mouth every 4 (four) hours as needed for mild pain or headaches, Starting Wed 11/3/2021, Normal      !! acetaminophen (TYLENOL) 325 mg tablet Take 3 tablets (115 mg total) by mouth 3 (three) times a day as needed for mild pain or fever, Starting Fri 1/6/2023, Normal      benzonatate (TESSALON PERLES) 100 mg capsule Take 1 capsule (100 mg total) by mouth every 8 (eight) hours, Starting Fri 1/6/2023, Normal      brompheniramine-pseudoephedrine-DM 30-2-10 MG/5ML syrup Take 5 mL by mouth 4 (four) times a day as needed for congestion, cough or allergies, Starting Wed 1/4/2023, Normal      cyclobenzaprine (FLEXERIL) 10 mg tablet TAKE 1 TABLET BY MOUTH EVERY DAY NIGHTLY AS NEEDED FOR MUSCLE SPASMS, Historical Med      escitalopram (LEXAPRO) 10 mg tablet TAKE 1 TABLET BY MOUTH EVERY DAY, Normal      !! ibuprofen (MOTRIN) 600 mg tablet Take 1 tablet (600 mg total) by mouth every 6 (six) hours as needed (cramping), Starting Wed 11/3/2021, Normal      !! ibuprofen (MOTRIN) 600 mg tablet Take 1 tablet (600 mg total) by mouth every 6 (six) hours as needed for mild pain, Starting Fri 1/6/2023, Normal      medroxyPROGESTERone (DEPO-PROVERA) 150 mg/mL injection INJECT 1 ML (150 MG TOTAL) INTO A MUSCLE EVERY 3 (THREE) MONTHS, Starting Sat 3/25/2023, Normal       !! - Potential duplicate medications found  Please discuss with provider  No discharge procedures on file  PDMP Review     None           ED Provider  Attending physically available and evaluated [facility]  I managed the patient along with the ED Attending      Electronically Signed by         Dixie Coreas MD  06/02/23 2108

## 2023-06-12 ENCOUNTER — APPOINTMENT (EMERGENCY)
Dept: RADIOLOGY | Facility: HOSPITAL | Age: 23
End: 2023-06-12
Payer: COMMERCIAL

## 2023-06-12 ENCOUNTER — HOSPITAL ENCOUNTER (EMERGENCY)
Facility: HOSPITAL | Age: 23
Discharge: HOME | End: 2023-06-12
Attending: EMERGENCY MEDICINE
Payer: COMMERCIAL

## 2023-06-12 VITALS
SYSTOLIC BLOOD PRESSURE: 144 MMHG | HEART RATE: 93 BPM | RESPIRATION RATE: 18 BRPM | DIASTOLIC BLOOD PRESSURE: 86 MMHG | TEMPERATURE: 98.8 F | OXYGEN SATURATION: 98 %

## 2023-06-12 DIAGNOSIS — J20.9 ACUTE TRACHEOBRONCHITIS: Primary | ICD-10-CM

## 2023-06-12 LAB
ANION GAP SERPL CALCULATED.3IONS-SCNC: 11 MMOL/L (ref 4–13)
BASOPHILS # BLD AUTO: 0.05 THOUSANDS/ÂΜL (ref 0–0.1)
BASOPHILS NFR BLD AUTO: 1 % (ref 0–1)
BUN SERPL-MCNC: 10 MG/DL (ref 5–25)
CALCIUM SERPL-MCNC: 9.8 MG/DL (ref 8.4–10.2)
CHLORIDE SERPL-SCNC: 108 MMOL/L (ref 96–108)
CO2 SERPL-SCNC: 23 MMOL/L (ref 21–32)
CREAT SERPL-MCNC: 0.66 MG/DL (ref 0.6–1.3)
EOSINOPHIL # BLD AUTO: 0.03 THOUSAND/ÂΜL (ref 0–0.61)
EOSINOPHIL NFR BLD AUTO: 0 % (ref 0–6)
ERYTHROCYTE [DISTWIDTH] IN BLOOD BY AUTOMATED COUNT: 12.1 % (ref 11.6–15.1)
FLUAV RNA RESP QL NAA+PROBE: NEGATIVE
FLUBV RNA RESP QL NAA+PROBE: NEGATIVE
GFR SERPL CREATININE-BSD FRML MDRD: 126 ML/MIN/1.73SQ M
GLUCOSE SERPL-MCNC: 68 MG/DL (ref 65–140)
HCT VFR BLD AUTO: 44.9 % (ref 34.8–46.1)
HGB BLD-MCNC: 15 G/DL (ref 11.5–15.4)
IMM GRANULOCYTES # BLD AUTO: 0.03 THOUSAND/UL (ref 0–0.2)
IMM GRANULOCYTES NFR BLD AUTO: 0 % (ref 0–2)
LYMPHOCYTES # BLD AUTO: 1.73 THOUSANDS/ÂΜL (ref 0.6–4.47)
LYMPHOCYTES NFR BLD AUTO: 23 % (ref 14–44)
MCH RBC QN AUTO: 30.2 PG (ref 26.8–34.3)
MCHC RBC AUTO-ENTMCNC: 33.4 G/DL (ref 31.4–37.4)
MCV RBC AUTO: 91 FL (ref 82–98)
MONOCYTES # BLD AUTO: 0.52 THOUSAND/ÂΜL (ref 0.17–1.22)
MONOCYTES NFR BLD AUTO: 7 % (ref 4–12)
NEUTROPHILS # BLD AUTO: 5.17 THOUSANDS/ÂΜL (ref 1.85–7.62)
NEUTS SEG NFR BLD AUTO: 69 % (ref 43–75)
NRBC BLD AUTO-RTO: 0 /100 WBCS
PLATELET # BLD AUTO: 262 THOUSANDS/UL (ref 149–390)
PMV BLD AUTO: 9.6 FL (ref 8.9–12.7)
POTASSIUM SERPL-SCNC: 3.8 MMOL/L (ref 3.5–5.3)
RBC # BLD AUTO: 4.96 MILLION/UL (ref 3.81–5.12)
RSV RNA RESP QL NAA+PROBE: NEGATIVE
S PYO DNA THROAT QL NAA+PROBE: NOT DETECTED
SARS-COV-2 RNA RESP QL NAA+PROBE: NEGATIVE
SODIUM SERPL-SCNC: 142 MMOL/L (ref 135–147)
WBC # BLD AUTO: 7.53 THOUSAND/UL (ref 4.31–10.16)

## 2023-06-12 PROCEDURE — 36415 COLL VENOUS BLD VENIPUNCTURE: CPT | Performed by: EMERGENCY MEDICINE

## 2023-06-12 PROCEDURE — 93005 ELECTROCARDIOGRAM TRACING: CPT

## 2023-06-12 PROCEDURE — 85025 COMPLETE CBC W/AUTO DIFF WBC: CPT | Performed by: EMERGENCY MEDICINE

## 2023-06-12 PROCEDURE — 80048 BASIC METABOLIC PNL TOTAL CA: CPT | Performed by: EMERGENCY MEDICINE

## 2023-06-12 PROCEDURE — 0241U HB NFCT DS VIR RESP RNA 4 TRGT: CPT | Performed by: EMERGENCY MEDICINE

## 2023-06-12 PROCEDURE — 87651 STREP A DNA AMP PROBE: CPT | Performed by: EMERGENCY MEDICINE

## 2023-06-12 PROCEDURE — 71045 X-RAY EXAM CHEST 1 VIEW: CPT

## 2023-06-12 RX ORDER — MAGNESIUM SULFATE HEPTAHYDRATE 40 MG/ML
2 INJECTION, SOLUTION INTRAVENOUS ONCE
Status: COMPLETED | OUTPATIENT
Start: 2023-06-12 | End: 2023-06-12

## 2023-06-12 RX ORDER — AZITHROMYCIN 250 MG/1
500 TABLET, FILM COATED ORAL ONCE
Status: COMPLETED | OUTPATIENT
Start: 2023-06-12 | End: 2023-06-12

## 2023-06-12 RX ORDER — GUAIFENESIN/DEXTROMETHORPHAN 100-10MG/5
5 SYRUP ORAL 3 TIMES DAILY PRN
Qty: 118 ML | Refills: 0 | Status: SHIPPED | OUTPATIENT
Start: 2023-06-12

## 2023-06-12 RX ORDER — IPRATROPIUM BROMIDE AND ALBUTEROL SULFATE 2.5; .5 MG/3ML; MG/3ML
3 SOLUTION RESPIRATORY (INHALATION) ONCE
Status: COMPLETED | OUTPATIENT
Start: 2023-06-12 | End: 2023-06-12

## 2023-06-12 RX ORDER — AZITHROMYCIN 250 MG/1
250 TABLET, FILM COATED ORAL EVERY 24 HOURS
Qty: 4 TABLET | Refills: 0 | Status: SHIPPED | OUTPATIENT
Start: 2023-06-13 | End: 2023-06-17

## 2023-06-12 RX ORDER — GUAIFENESIN/DEXTROMETHORPHAN 100-10MG/5
10 SYRUP ORAL ONCE
Status: COMPLETED | OUTPATIENT
Start: 2023-06-12 | End: 2023-06-12

## 2023-06-12 RX ORDER — IBUPROFEN 600 MG/1
600 TABLET ORAL ONCE
Status: DISCONTINUED | OUTPATIENT
Start: 2023-06-12 | End: 2023-06-12

## 2023-06-12 RX ORDER — DEXAMETHASONE SODIUM PHOSPHATE 10 MG/ML
8 INJECTION, SOLUTION INTRAMUSCULAR; INTRAVENOUS ONCE
Status: COMPLETED | OUTPATIENT
Start: 2023-06-12 | End: 2023-06-12

## 2023-06-12 RX ADMIN — IPRATROPIUM BROMIDE AND ALBUTEROL SULFATE 3 ML: .5; 3 SOLUTION RESPIRATORY (INHALATION) at 18:57

## 2023-06-12 RX ADMIN — AZITHROMYCIN MONOHYDRATE 500 MG: 250 TABLET ORAL at 19:58

## 2023-06-12 RX ADMIN — DEXAMETHASONE SODIUM PHOSPHATE 8 MG: 10 INJECTION, SOLUTION INTRAMUSCULAR; INTRAVENOUS at 18:53

## 2023-06-12 RX ADMIN — MAGNESIUM SULFATE HEPTAHYDRATE 2 G: 40 INJECTION, SOLUTION INTRAVENOUS at 18:55

## 2023-06-12 RX ADMIN — GUAIFENESIN AND DEXTROMETHORPHAN 10 ML: 100; 10 SYRUP ORAL at 18:52

## 2023-06-12 NOTE — ED PROVIDER NOTES
History  Chief Complaint   Patient presents with   • Cough     Pt arrives c/o cough and sore throat x 1 wk  Pt reports pain in chest with breathing  14-year-old female comes in for evaluation of cough and sore throat  Patient states approxi-1 week ago she is started to feel like she had upper respiratory infection  Mostly complaining of a mild sore throat and then a cough  She states over the last 24 hours her symptoms have gotten much worse and now she has tightness in her chest and is coughing up gray-yellow sputum  Patient states that she does vape  History provided by:  Patient   used: No    Cough  Cough characteristics:  Productive  Sputum characteristics:  Yellow and gray  Severity:  Moderate  Onset quality:  Sudden  Duration:  1 day  Timing:  Intermittent  Chronicity:  New  Smoker: yes    Context: upper respiratory infection    Ineffective treatments:  None tried  Associated symptoms: chest pain and sore throat    Associated symptoms: no ear pain, no eye discharge, no fever, no headaches, no rash, no shortness of breath, no sinus congestion and no wheezing    Risk factors: no chemical exposure and no recent travel        Prior to Admission Medications   Prescriptions Last Dose Informant Patient Reported?  Taking?   acetaminophen (TYLENOL) 325 mg tablet  Self No No   Sig: Take 2 tablets (650 mg total) by mouth every 4 (four) hours as needed for mild pain or headaches   acetaminophen (TYLENOL) 325 mg tablet   No No   Sig: Take 3 tablets (975 mg total) by mouth 3 (three) times a day as needed for mild pain or fever   benzonatate (TESSALON PERLES) 100 mg capsule   No No   Sig: Take 1 capsule (100 mg total) by mouth every 8 (eight) hours   cyclobenzaprine (FLEXERIL) 10 mg tablet   Yes No   Sig: TAKE 1 TABLET BY MOUTH EVERY DAY NIGHTLY AS NEEDED FOR MUSCLE SPASMS   escitalopram (LEXAPRO) 10 mg tablet   No No   Sig: TAKE 1 TABLET BY MOUTH EVERY DAY   ibuprofen (MOTRIN) 600 mg tablet Self No No   Sig: Take 1 tablet (600 mg total) by mouth every 6 (six) hours as needed (cramping)   ibuprofen (MOTRIN) 600 mg tablet   No No   Sig: Take 1 tablet (600 mg total) by mouth every 6 (six) hours as needed for mild pain   medroxyPROGESTERone (DEPO-PROVERA) 150 mg/mL injection   No No   Sig: INJECT 1 ML (150 MG TOTAL) INTO A MUSCLE EVERY 3 (THREE) MONTHS      Facility-Administered Medications: None       Past Medical History:   Diagnosis Date   • Anxiety    • Heart murmur    • Hyperlipidemia    • Migraine    • VSD (ventricular septal defect and aortic arch hypoplasia        History reviewed  No pertinent surgical history  Family History   Problem Relation Age of Onset   • No Known Problems Mother    • Heart disease Father      I have reviewed and agree with the history as documented  E-Cigarette/Vaping   • E-Cigarette Use Current Every Day User      E-Cigarette/Vaping Substances   • Nicotine Yes    • THC No    • CBD No    • Flavoring Yes    • Other No    • Unknown No      Social History     Tobacco Use   • Smoking status: Never   • Smokeless tobacco: Never   • Tobacco comments:     vaping   Vaping Use   • Vaping Use: Every day   • Substances: Nicotine, Flavoring   Substance Use Topics   • Alcohol use: No   • Drug use: No       Review of Systems   Constitutional: Negative for fatigue and fever  HENT: Positive for sore throat  Negative for congestion and ear pain  Eyes: Negative for discharge and redness  Respiratory: Positive for cough  Negative for apnea, shortness of breath and wheezing  Cardiovascular: Positive for chest pain  Gastrointestinal: Negative for abdominal pain and diarrhea  Endocrine: Negative for cold intolerance and polydipsia  Genitourinary: Negative for difficulty urinating and hematuria  Musculoskeletal: Negative for arthralgias and back pain  Skin: Negative for color change and rash     Allergic/Immunologic: Negative for environmental allergies and immunocompromised state  Neurological: Negative for numbness and headaches  Hematological: Negative for adenopathy  Does not bruise/bleed easily  Psychiatric/Behavioral: Negative for agitation and behavioral problems  Physical Exam  Physical Exam  Vitals and nursing note reviewed  Constitutional:       Appearance: Normal appearance  She is well-developed  She is not toxic-appearing  HENT:      Head: Normocephalic and atraumatic  Right Ear: Tympanic membrane and external ear normal       Left Ear: Tympanic membrane and external ear normal       Nose: Rhinorrhea present  No nasal deformity  Mouth/Throat:      Dentition: Normal dentition  Pharynx: Uvula midline  Posterior oropharyngeal erythema present  Eyes:      General: Lids are normal          Right eye: No discharge  Left eye: No discharge  Conjunctiva/sclera: Conjunctivae normal       Pupils: Pupils are equal, round, and reactive to light  Neck:      Vascular: No carotid bruit or JVD  Trachea: Trachea normal    Cardiovascular:      Rate and Rhythm: Normal rate and regular rhythm  No extrasystoles are present  Chest Wall: PMI is not displaced  Pulses: Normal pulses  Pulmonary:      Effort: Pulmonary effort is normal  No accessory muscle usage or respiratory distress  Breath sounds: Decreased breath sounds present  No wheezing, rhonchi or rales  Abdominal:      General: Bowel sounds are normal       Palpations: Abdomen is soft  Abdomen is not rigid  There is no mass  Tenderness: There is no abdominal tenderness  There is no guarding or rebound  Musculoskeletal:      Right shoulder: No swelling, deformity or bony tenderness  Normal range of motion  Cervical back: Normal range of motion and neck supple  No deformity, tenderness or bony tenderness  Lymphadenopathy:      Cervical: No cervical adenopathy  Skin:     General: Skin is warm and dry  Findings: No rash     Neurological: Mental Status: She is alert and oriented to person, place, and time  GCS: GCS eye subscore is 4  GCS verbal subscore is 5  GCS motor subscore is 6  Cranial Nerves: No cranial nerve deficit  Sensory: No sensory deficit  Deep Tendon Reflexes: Reflexes are normal and symmetric  Psychiatric:         Speech: Speech normal          Behavior: Behavior normal          Vital Signs  ED Triage Vitals [06/12/23 1712]   Temperature Pulse Respirations Blood Pressure SpO2   98 8 °F (37 1 °C) 93 18 144/86 98 %      Temp Source Heart Rate Source Patient Position - Orthostatic VS BP Location FiO2 (%)   Oral Monitor Sitting Right arm --      Pain Score       7           Vitals:    06/12/23 1712   BP: 144/86   Pulse: 93   Patient Position - Orthostatic VS: Sitting         Visual Acuity      ED Medications  Medications   dextromethorphan-guaiFENesin (ROBITUSSIN DM) oral syrup 10 mL (10 mL Oral Given 6/12/23 1852)   ipratropium-albuterol (DUO-NEB) 0 5-2 5 mg/3 mL inhalation solution 3 mL (3 mL Nebulization Given 6/12/23 1857)   magnesium sulfate 2 g/50 mL IVPB (premix) 2 g (0 g Intravenous Stopped 6/12/23 1915)   dexamethasone (PF) (DECADRON) injection 8 mg (8 mg Intravenous Given 6/12/23 1853)   azithromycin (ZITHROMAX) tablet 500 mg (500 mg Oral Given 6/12/23 1958)       Diagnostic Studies  Results Reviewed     Procedure Component Value Units Date/Time    FLU/RSV/COVID - if FLU/RSV clinically relevant [[id number]]  (Normal) Collected: 06/12/23 1841    Lab Status: Final result Specimen: Nares from Nose Updated: 06/12/23 1938     SARS-CoV-2 Negative     INFLUENZA A PCR Negative     INFLUENZA B PCR Negative     RSV PCR Negative    Narrative:      FOR PEDIATRIC PATIENTS - copy/paste COVID Guidelines URL to browser: [email or web address]    SARS-CoV-2 assay is a Nucleic Acid Amplification assay intended for the  qualitative detection of nucleic acid from SARS-CoV-2 in nasopharyngeal  swabs  Results are for the presumptive identification of SARS-CoV-2 RNA  Positive results are indicative of infection with SARS-CoV-2, the virus  causing COVID-19, but do not rule out bacterial infection or co-infection  with other viruses  Laboratories within the [place] and its  territories are required to report all positive results to the appropriate  public health authorities  Negative results do not preclude SARS-CoV-2  infection and should not be used as the sole basis for treatment or other  patient management decisions  Negative results must be combined with  clinical observations, patient history, and epidemiological information  This test has not been FDA cleared or approved  This test has been authorized by FDA under an Emergency Use Authorization  (EUA)  This test is only authorized for the duration of time the  declaration that circumstances exist justifying the authorization of the  emergency use of an in vitro diagnostic tests for detection of SARS-CoV-2  virus and/or diagnosis of COVID-19 infection under section 564(b)(1) of  the Act, 21 U  S C  238LHF-0(W)(4), unless the authorization is terminated  or revoked sooner  The test has been validated but independent review by FDA  and CLIA is pending  Test performed using [employer] Gene[employer]pert: This RT-PCR assay targets N2,  a region unique to SARS-CoV-2  A conserved region in the E-gene was chosen  for pan-Sarbecovirus detection which includes SARS-CoV-2  According to CMS-2020-01-R, this platform meets the definition of high-throughput technology      Strep A PCR [[id number]]  (Normal) Collected: 06/12/23 1841    Lab Status: Final result Specimen: Throat Updated: 06/12/23 1925     STREP A PCR Not Detected    Basic metabolic panel [[id number]] Collected: 06/12/23 1841    Lab Status: Final result Specimen: Blood from Arm, Left Updated: 06/12/23 1912     Sodium 142 mmol/L      Potassium 3 8 mmol/L      Chloride 108 mmol/L      CO2 23 mmol/L      ANION GAP 11 mmol/L      BUN 10 mg/dL      Creatinine 0 66 mg/dL      Glucose 68 mg/dL      Calcium 9 8 mg/dL      eGFR 126 ml/min/1 73sq m     Narrative:      [place] guidelines for Chronic Kidney Disease (CKD):   •  Stage 1 with normal or high GFR (GFR > 90 mL/min/1 73 square meters)  •  Stage 2 Mild CKD (GFR = 60-89 mL/min/1 73 square meters)  •  Stage 3A Moderate CKD (GFR = 45-59 mL/min/1 73 square meters)  •  Stage 3B Moderate CKD (GFR = 30-44 mL/min/1 73 square meters)  •  Stage 4 Severe CKD (GFR = 15-29 mL/min/1 73 square meters)  •  Stage 5 End Stage CKD (GFR <15 mL/min/1 73 square meters)  Note: GFR calculation is accurate only with a steady state creatinine    CBC and differential [[id number]] Collected: 06/12/23 1841    Lab Status: Final result Specimen: Blood from Arm, Left Updated: 06/12/23 1859     WBC 7 53 Thousand/uL      RBC 4 96 Million/uL      Hemoglobin 15 0 g/dL      Hematocrit 44 9 %      MCV 91 fL      MCH 30 2 pg      MCHC 33 4 g/dL      RDW 12 1 %      MPV 9 6 fL      Platelets 741 Thousands/uL      nRBC 0 /100 WBCs      Neutrophils Relative 69 %      Immat GRANS % 0 %      Lymphocytes Relative 23 %      Monocytes Relative 7 %      Eosinophils Relative 0 %      Basophils Relative 1 %      Neutrophils Absolute 5 17 Thousands/µL      Immature Grans Absolute 0 03 Thousand/uL      Lymphocytes Absolute 1 73 Thousands/µL      Monocytes Absolute 0 52 Thousand/µL      Eosinophils Absolute 0 03 Thousand/µL      Basophils Absolute 0 05 Thousands/µL                  XR chest 1 view portable    (Results Pending)              Procedures  ECG 12 Lead Documentation Only    Date/Time: 6/12/2023 5:16 PM    Performed by: Dimitry Brown DO  Authorized by: Cristina Graham DO    Rate:     ECG rate:  110  Rhythm:     Rhythm: sinus tachycardia               ED Course                       PERC Rule for PE    Flowsheet Row Most Recent Value   PERC Rule for PE    Age >=50 0 Filed at: 06/12/2023 2034   HR >=100 0 Filed at: 06/12/2023 2034   O2 Sat on room air < 95% 0 Filed at: 06/12/2023 2034   History of PE or DVT 0 Filed at: 06/12/2023 2034   Recent trauma or surgery 0 Filed at: 06/12/2023 2034   Hemoptysis 0 Filed at: 06/12/2023 2034   Exogenous estrogen 0 Filed at: 06/12/2023 2034   Unilateral leg swelling 0 Filed at: 06/12/2023 2034   PERC Rule for PE Results 0 Filed at: 06/12/2023 2034                            Medical Decision Making  Differential diagnosis includes but is not limited to acute bronchitis, pneumonia, COVID flu RSV or other viral illness, URI less likely pneumothorax patient perks out for PE    Acute tracheobronchitis: acute illness or injury  Amount and/or Complexity of Data Reviewed  Labs: ordered  Decision-making details documented in ED Course  Radiology: ordered and independent interpretation performed  Details: Chest x-ray within normal limits no pneumonia or pneumothorax      Risk  OTC drugs  Prescription drug management  Disposition  Final diagnoses:   Acute tracheobronchitis     Time reflects when diagnosis was documented in both MDM as applicable and the Disposition within this note     Time User Action Codes Description Comment    6/12/2023  7:46 PM Sharri Guajardo Add [J20 9] Acute tracheobronchitis       ED Disposition     ED Disposition   Discharge    Condition   Stable    Date/Time   Mon Jun 12, 2023  7:45 PM    [address] discharge to home/self care                 Follow-up Information     Follow up With Specialties Details Why Contact Aden Coyne DO Family Medicine Schedule an appointment as soon as possible for a visit   [address] [place] [address]  [phone]            Discharge Medication List as of 6/12/2023  7:52 PM      START taking these medications    Details   azithromycin (ZITHROMAX) 250 mg tablet Take 1 tablet (250 mg total) by mouth every 24 hours for 4 days Do not start before June 13, 2023 , Starting Tue 6/13/2023, Until Sat 6/17/2023, Normal      dextromethorphan-guaiFENesin (ROBITUSSIN DM)  mg/5 mL syrup Take 5 mL by mouth 3 (three) times a day as needed for cough, Starting Mon 6/12/2023, Normal         CONTINUE these medications which have NOT CHANGED    Details   !! acetaminophen (TYLENOL) 325 mg tablet Take 2 tablets (650 mg total) by mouth every 4 (four) hours as needed for mild pain or headaches, Starting Wed 11/3/2021, Normal      !! acetaminophen (TYLENOL) 325 mg tablet Take 3 tablets (975 mg total) by mouth 3 (three) times a day as needed for mild pain or fever, Starting Fri 1/6/2023, Normal      benzonatate (TESSALON PERLES) 100 mg capsule Take 1 capsule (100 mg total) by mouth every 8 (eight) hours, Starting Fri 1/6/2023, Normal      cyclobenzaprine (FLEXERIL) 10 mg tablet TAKE 1 TABLET BY MOUTH EVERY DAY NIGHTLY AS NEEDED FOR MUSCLE SPASMS, Historical Med      escitalopram (LEXAPRO) 10 mg tablet TAKE 1 TABLET BY MOUTH EVERY DAY, Normal      !! ibuprofen (MOTRIN) 600 mg tablet Take 1 tablet (600 mg total) by mouth every 6 (six) hours as needed (cramping), Starting Wed 11/3/2021, Normal      !! ibuprofen (MOTRIN) 600 mg tablet Take 1 tablet (600 mg total) by mouth every 6 (six) hours as needed for mild pain, Starting Fri 1/6/2023, Normal      medroxyPROGESTERone (DEPO-PROVERA) 150 mg/mL injection INJECT 1 ML (150 MG TOTAL) INTO A MUSCLE EVERY 3 (THREE) MONTHS, Starting Sat 3/25/2023, Normal       !! - Potential duplicate medications found  Please discuss with provider  No discharge procedures on file      PDMP Review     None          ED Provider  Electronically Signed by           Shan Munoz, DO  06/12/23 [address], DO  06/12/23 2036

## 2023-06-12 NOTE — Clinical Note
Poonam Momin was seen and treated in our emergency department on 6/12/2023  Diagnosis:     Terra Prince  may return to work on return date  She may return on this date: 06/14/2023         If you have any questions or concerns, please don't hesitate to call        Ar Chris DO    ______________________________           _______________          _______________  Hospital Representative                              Date                                Time

## 2023-06-12 NOTE — Clinical Note
Seven Reid was seen and treated in our emergency department on 6/12/2023  Diagnosis:     Carlos Enrique Fernando  may return to work on return date  She may return on this date: 06/14/2023         If you have any questions or concerns, please don't hesitate to call        Shan Munoz DO    ______________________________           _______________          _______________  Hospital Representative                              Date                                Time

## 2023-06-12 NOTE — Clinical Note
Alicia Flaca was seen and treated in our emergency department on 6/12/2023  Diagnosis:     Orquidea Carias  may return to work on return date  She may return on this date: 06/14/2023         If you have any questions or concerns, please don't hesitate to call        Salbador Hebert DO    ______________________________           _______________          _______________  Hospital Representative                              Date                                Time

## 2023-06-13 LAB
ATRIAL RATE: 110 BPM
P AXIS: 77 DEGREES
PR INTERVAL: 136 MS
QRS AXIS: 51 DEGREES
QRSD INTERVAL: 72 MS
QT INTERVAL: 316 MS
QTC INTERVAL: 427 MS
T WAVE AXIS: -8 DEGREES
VENTRICULAR RATE: 110 BPM

## 2023-06-13 PROCEDURE — 93010 ELECTROCARDIOGRAM REPORT: CPT | Performed by: INTERNAL MEDICINE

## 2023-07-22 PROCEDURE — 99285 EMERGENCY DEPT VISIT HI MDM: CPT

## 2023-07-23 ENCOUNTER — APPOINTMENT (EMERGENCY)
Dept: RADIOLOGY | Facility: HOSPITAL | Age: 23
End: 2023-07-23
Payer: COMMERCIAL

## 2023-07-23 ENCOUNTER — HOSPITAL ENCOUNTER (EMERGENCY)
Facility: HOSPITAL | Age: 23
Discharge: HOME | End: 2023-07-23
Attending: EMERGENCY MEDICINE
Payer: COMMERCIAL

## 2023-07-23 VITALS
OXYGEN SATURATION: 100 % | BODY MASS INDEX: 20.76 KG/M2 | HEART RATE: 77 BPM | RESPIRATION RATE: 18 BRPM | TEMPERATURE: 98.2 F | WEIGHT: 103 LBS | HEIGHT: 59 IN | SYSTOLIC BLOOD PRESSURE: 120 MMHG | DIASTOLIC BLOOD PRESSURE: 70 MMHG

## 2023-07-23 DIAGNOSIS — N39.0 UTI (URINARY TRACT INFECTION): Primary | ICD-10-CM

## 2023-07-23 DIAGNOSIS — R53.1 WEAKNESS: ICD-10-CM

## 2023-07-23 DIAGNOSIS — E86.0 MILD DEHYDRATION: ICD-10-CM

## 2023-07-23 LAB
ALBUMIN SERPL BCP-MCNC: 4.7 G/DL (ref 3.5–5)
ALP SERPL-CCNC: 70 U/L (ref 34–104)
ALT SERPL W P-5'-P-CCNC: 8 U/L (ref 7–52)
ANION GAP SERPL CALCULATED.3IONS-SCNC: 9 MMOL/L
AST SERPL W P-5'-P-CCNC: 13 U/L (ref 13–39)
ATRIAL RATE: 65 BPM
BACTERIA UR QL AUTO: ABNORMAL /HPF
BASOPHILS # BLD AUTO: 0.06 THOUSANDS/ÂΜL (ref 0–0.1)
BASOPHILS NFR BLD AUTO: 1 % (ref 0–1)
BILIRUB SERPL-MCNC: 0.58 MG/DL (ref 0.2–1)
BILIRUB UR QL STRIP: NEGATIVE
BUN SERPL-MCNC: 11 MG/DL (ref 5–25)
CALCIUM SERPL-MCNC: 9.4 MG/DL (ref 8.4–10.2)
CARDIAC TROPONIN I PNL SERPL HS: <2 NG/L
CHLORIDE SERPL-SCNC: 107 MMOL/L (ref 96–108)
CLARITY UR: CLEAR
CO2 SERPL-SCNC: 23 MMOL/L (ref 21–32)
COLOR UR: YELLOW
CREAT SERPL-MCNC: 0.68 MG/DL (ref 0.6–1.3)
EOSINOPHIL # BLD AUTO: 0.07 THOUSAND/ÂΜL (ref 0–0.61)
EOSINOPHIL NFR BLD AUTO: 1 % (ref 0–6)
ERYTHROCYTE [DISTWIDTH] IN BLOOD BY AUTOMATED COUNT: 12.9 % (ref 11.6–15.1)
EXT PREGNANCY TEST URINE: NEGATIVE
EXT. CONTROL: NORMAL
GFR SERPL CREATININE-BSD FRML MDRD: 125 ML/MIN/1.73SQ M
GLUCOSE SERPL-MCNC: 72 MG/DL (ref 65–140)
GLUCOSE UR STRIP-MCNC: NEGATIVE MG/DL
HCT VFR BLD AUTO: 42 % (ref 34.8–46.1)
HGB BLD-MCNC: 13.8 G/DL (ref 11.5–15.4)
HGB UR QL STRIP.AUTO: ABNORMAL
IMM GRANULOCYTES # BLD AUTO: 0.04 THOUSAND/UL (ref 0–0.2)
IMM GRANULOCYTES NFR BLD AUTO: 1 % (ref 0–2)
KETONES UR STRIP-MCNC: ABNORMAL MG/DL
LEUKOCYTE ESTERASE UR QL STRIP: NEGATIVE
LYMPHOCYTES # BLD AUTO: 2.86 THOUSANDS/ÂΜL (ref 0.6–4.47)
LYMPHOCYTES NFR BLD AUTO: 39 % (ref 14–44)
MAGNESIUM SERPL-MCNC: 2.1 MG/DL (ref 1.9–2.7)
MCH RBC QN AUTO: 30.3 PG (ref 26.8–34.3)
MCHC RBC AUTO-ENTMCNC: 32.9 G/DL (ref 31.4–37.4)
MCV RBC AUTO: 92 FL (ref 82–98)
MONOCYTES # BLD AUTO: 0.54 THOUSAND/ÂΜL (ref 0.17–1.22)
MONOCYTES NFR BLD AUTO: 7 % (ref 4–12)
MUCOUS THREADS UR QL AUTO: ABNORMAL
NEUTROPHILS # BLD AUTO: 3.81 THOUSANDS/ÂΜL (ref 1.85–7.62)
NEUTS SEG NFR BLD AUTO: 51 % (ref 43–75)
NITRITE UR QL STRIP: NEGATIVE
NON-SQ EPI CELLS URNS QL MICRO: ABNORMAL /HPF
NRBC BLD AUTO-RTO: 0 /100 WBCS
P AXIS: 46 DEGREES
PH UR STRIP.AUTO: 6 [PH]
PLATELET # BLD AUTO: 242 THOUSANDS/UL (ref 149–390)
PMV BLD AUTO: 9.8 FL (ref 8.9–12.7)
POTASSIUM SERPL-SCNC: 3.6 MMOL/L (ref 3.5–5.3)
PR INTERVAL: 158 MS
PROT SERPL-MCNC: 7 G/DL (ref 6.4–8.4)
PROT UR STRIP-MCNC: ABNORMAL MG/DL
QRS AXIS: 52 DEGREES
QRSD INTERVAL: 84 MS
QT INTERVAL: 386 MS
QTC INTERVAL: 401 MS
RBC # BLD AUTO: 4.56 MILLION/UL (ref 3.81–5.12)
RBC #/AREA URNS AUTO: ABNORMAL /HPF
SODIUM SERPL-SCNC: 139 MMOL/L (ref 135–147)
SP GR UR STRIP.AUTO: 1.03 (ref 1–1.03)
T WAVE AXIS: 40 DEGREES
TSH SERPL DL<=0.05 MIU/L-ACNC: 1.4 UIU/ML (ref 0.45–4.5)
UROBILINOGEN UR STRIP-ACNC: <2 MG/DL
VENTRICULAR RATE: 65 BPM
WBC # BLD AUTO: 7.38 THOUSAND/UL (ref 4.31–10.16)
WBC #/AREA URNS AUTO: ABNORMAL /HPF

## 2023-07-23 PROCEDURE — 71045 X-RAY EXAM CHEST 1 VIEW: CPT

## 2023-07-23 PROCEDURE — 93005 ELECTROCARDIOGRAM TRACING: CPT

## 2023-07-23 PROCEDURE — 93010 ELECTROCARDIOGRAM REPORT: CPT | Performed by: INTERNAL MEDICINE

## 2023-07-23 PROCEDURE — 81025 URINE PREGNANCY TEST: CPT | Performed by: PHYSICIAN ASSISTANT

## 2023-07-23 PROCEDURE — 83735 ASSAY OF MAGNESIUM: CPT | Performed by: PHYSICIAN ASSISTANT

## 2023-07-23 PROCEDURE — 81001 URINALYSIS AUTO W/SCOPE: CPT | Performed by: PHYSICIAN ASSISTANT

## 2023-07-23 PROCEDURE — 96374 THER/PROPH/DIAG INJ IV PUSH: CPT

## 2023-07-23 PROCEDURE — 85025 COMPLETE CBC W/AUTO DIFF WBC: CPT | Performed by: PHYSICIAN ASSISTANT

## 2023-07-23 PROCEDURE — 87086 URINE CULTURE/COLONY COUNT: CPT | Performed by: PHYSICIAN ASSISTANT

## 2023-07-23 PROCEDURE — 84484 ASSAY OF TROPONIN QUANT: CPT | Performed by: PHYSICIAN ASSISTANT

## 2023-07-23 PROCEDURE — 96361 HYDRATE IV INFUSION ADD-ON: CPT

## 2023-07-23 PROCEDURE — 80053 COMPREHEN METABOLIC PANEL: CPT | Performed by: PHYSICIAN ASSISTANT

## 2023-07-23 PROCEDURE — 36415 COLL VENOUS BLD VENIPUNCTURE: CPT | Performed by: PHYSICIAN ASSISTANT

## 2023-07-23 PROCEDURE — 84443 ASSAY THYROID STIM HORMONE: CPT | Performed by: PHYSICIAN ASSISTANT

## 2023-07-23 RX ORDER — CEPHALEXIN 250 MG/1
500 CAPSULE ORAL ONCE
Status: COMPLETED | OUTPATIENT
Start: 2023-07-23 | End: 2023-07-23

## 2023-07-23 RX ORDER — ONDANSETRON 2 MG/ML
4 INJECTION INTRAMUSCULAR; INTRAVENOUS ONCE
Status: COMPLETED | OUTPATIENT
Start: 2023-07-23 | End: 2023-07-23

## 2023-07-23 RX ORDER — CEPHALEXIN 500 MG/1
500 CAPSULE ORAL EVERY 12 HOURS SCHEDULED
Qty: 14 CAPSULE | Refills: 0 | Status: SHIPPED | OUTPATIENT
Start: 2023-07-23 | End: 2023-07-30

## 2023-07-23 RX ORDER — ONDANSETRON 4 MG/1
4 TABLET, ORALLY DISINTEGRATING ORAL ONCE
Status: DISCONTINUED | OUTPATIENT
Start: 2023-07-23 | End: 2023-07-23

## 2023-07-23 RX ADMIN — ONDANSETRON 4 MG: 2 INJECTION INTRAMUSCULAR; INTRAVENOUS at 02:05

## 2023-07-23 RX ADMIN — SODIUM CHLORIDE 1000 ML: 0.9 INJECTION, SOLUTION INTRAVENOUS at 01:31

## 2023-07-23 RX ADMIN — CEPHALEXIN 500 MG: 250 CAPSULE ORAL at 02:05

## 2023-07-23 NOTE — ED NOTES
Pt discharged home, referred to pharmacy to continue oral medications.       Anurag Tolentino RN  07/23/23 1753

## 2023-07-23 NOTE — Clinical Note
Kaela Montejo was seen and treated in our emergency department on 7/22/2023. Diagnosis:     Sasha  . She may return on this date: 07/25/2023         If you have any questions or concerns, please don't hesitate to call.       Fabiana Farmer PA-C    ______________________________           _______________          _______________  Hospital Representative                              Date                                Time

## 2023-07-23 NOTE — ED PROVIDER NOTES
History  Chief Complaint   Patient presents with   • Weakness - Generalized     Patient states she feels feint and nauseous. Patient states this started about 3 hours ago. Patient is a 61-year-old female with a history of hyperlipidemia, anxiety, and VSD with no significant past surgical history that presents emerged department with 1 bout of weakness stating for approximately 3 to 5 minutes at work. Patient has associated symptomatology of nausea beginning with the current presentation of generalized weakness symptoms. Patient states that she works at a local [employer] and reported feeling weak during her shift and came to emergency department for further evaluation of weakness symptoms. Patient also states that she has a history of tracheal septal defect for which she follows up with cardiology as an outpatient. Patient denies palliative and provocative factors. Patient denies noneffective treatment. Patient denies new medications. Patient also reports "I have not been eating or drinking well lately. Its stress, life is stressful."  Patient denies EtOH use. Patient denies illicit drug use. Patient denies recent fall recent trauma. Patient denies sick contacts and recent travel. Patient denies numbness, tingling, loss of power of any or all extremities. Patient denies headaches, tinnitus, dizziness, nausea, or vertiginous symptoms. Patient denies chest pain, shortness of breath, and abdominal pain. History provided by:  Patient   used: No        Prior to Admission Medications   Prescriptions Last Dose Informant Patient Reported?  Taking?   acetaminophen (TYLENOL) 325 mg tablet  Self No No   Sig: Take 2 tablets (650 mg total) by mouth every 4 (four) hours as needed for mild pain or headaches   acetaminophen (TYLENOL) 325 mg tablet   No No   Sig: Take 3 tablets (975 mg total) by mouth 3 (three) times a day as needed for mild pain or fever   benzonatate (TESSALON PERLES) 100 mg capsule   No No   Sig: Take 1 capsule (100 mg total) by mouth every 8 (eight) hours   cyclobenzaprine (FLEXERIL) 10 mg tablet   Yes No   Sig: TAKE 1 TABLET BY MOUTH EVERY DAY NIGHTLY AS NEEDED FOR MUSCLE SPASMS   dextromethorphan-guaiFENesin (ROBITUSSIN DM)  mg/5 mL syrup   No No   Sig: Take 5 mL by mouth 3 (three) times a day as needed for cough   escitalopram (LEXAPRO) 10 mg tablet   No No   Sig: TAKE 1 TABLET BY MOUTH EVERY DAY   ibuprofen (MOTRIN) 600 mg tablet  Self No No   Sig: Take 1 tablet (600 mg total) by mouth every 6 (six) hours as needed (cramping)   ibuprofen (MOTRIN) 600 mg tablet   No No   Sig: Take 1 tablet (600 mg total) by mouth every 6 (six) hours as needed for mild pain   medroxyPROGESTERone (DEPO-PROVERA) 150 mg/mL injection   No No   Sig: INJECT 1 ML (150 MG TOTAL) INTO A MUSCLE EVERY 3 (THREE) MONTHS      Facility-Administered Medications: None     Past Medical History:   Diagnosis Date   • Anxiety    • Heart murmur    • Hyperlipidemia    • Migraine    • VSD (ventricular septal defect and aortic arch hypoplasia        History reviewed. No pertinent surgical history. Family History   Problem Relation Age of Onset   • No Known Problems Mother    • Heart disease Father      I have reviewed and agree with the history as documented. E-Cigarette/Vaping   • E-Cigarette Use Current Every Day User      E-Cigarette/Vaping Substances   • Nicotine Yes    • THC No    • CBD No    • Flavoring Yes    • Other No    • Unknown No      Social History     Tobacco Use   • Smoking status: Never   • Smokeless tobacco: Never   • Tobacco comments:     vaping   Vaping Use   • Vaping Use: Every day   • Substances: Nicotine, Flavoring   Substance Use Topics   • Alcohol use: No   • Drug use: No       Review of Systems   Constitutional: Negative for activity change, appetite change, chills and fever.    HENT: Negative for congestion, ear pain, postnasal drip, rhinorrhea, sinus pressure, sinus pain, sore throat and tinnitus. Eyes: Negative for photophobia, pain and visual disturbance. Respiratory: Negative for cough, chest tightness and shortness of breath. Cardiovascular: Negative for chest pain and palpitations. Gastrointestinal: Negative for abdominal pain, constipation, diarrhea, nausea and vomiting. Genitourinary: Negative for difficulty urinating, dysuria, flank pain, frequency, hematuria and urgency. Musculoskeletal: Negative for arthralgias, back pain, gait problem, neck pain and neck stiffness. Skin: Negative for color change, pallor and rash. Allergic/Immunologic: Negative for environmental allergies and food allergies. Neurological: Positive for weakness. Negative for dizziness, seizures, syncope, numbness and headaches. Psychiatric/Behavioral: Negative for confusion. All other systems reviewed and are negative. Physical Exam  Physical Exam  Vitals and nursing note reviewed. Constitutional:       General: She is awake. Appearance: Normal appearance. She is well-developed. She is not ill-appearing, toxic-appearing or diaphoretic. Comments: /78 (BP Location: Right arm)   Pulse 78   Temp 98.2 °F (36.8 °C) (Oral)   Resp 18   LMP 07/22/2023   SpO2 98%      HENT:      Head: Normocephalic and atraumatic. Right Ear: Hearing and external ear normal. No decreased hearing noted. No drainage, swelling or tenderness. No mastoid tenderness. Left Ear: Hearing and external ear normal. No decreased hearing noted. No drainage, swelling or tenderness. No mastoid tenderness. Nose: Nose normal.      Mouth/Throat:      Lips: Pink. Mouth: Mucous membranes are moist.      Pharynx: Oropharynx is clear. Uvula midline. Eyes:      General: Lids are normal. Vision grossly intact. Right eye: No discharge. Left eye: No discharge. Extraocular Movements: Extraocular movements intact.       Conjunctiva/sclera: Conjunctivae normal.      Pupils: Pupils are equal, round, and reactive to light. Neck:      Vascular: No JVD. Trachea: Trachea and phonation normal. No tracheal tenderness or tracheal deviation. Cardiovascular:      Rate and Rhythm: Normal rate and regular rhythm. Pulses: Normal pulses. Radial pulses are 2+ on the right side and 2+ on the left side. Posterior tibial pulses are 2+ on the right side and 2+ on the left side. Heart sounds: Murmur heard. Pulmonary:      Effort: Pulmonary effort is normal.      Breath sounds: Normal breath sounds. No stridor. No decreased breath sounds, wheezing, rhonchi or rales. Chest:      Chest wall: No tenderness. Abdominal:      General: Abdomen is flat. Bowel sounds are normal. There is no distension. Palpations: Abdomen is soft. Abdomen is not rigid. Tenderness: There is no abdominal tenderness. There is no guarding or rebound. Musculoskeletal:         General: Normal range of motion. Cervical back: Full passive range of motion without pain, normal range of motion and neck supple. No rigidity. No spinous process tenderness or muscular tenderness. Normal range of motion. Comments: Passive ROM intact  Upper and lower extremity 5/5 bilaterally  Neurovascularly intact  No grinding or clicking of joints     Lymphadenopathy:      Head:      Right side of head: No submental, submandibular, tonsillar, preauricular, posterior auricular or occipital adenopathy. Left side of head: No submental, submandibular, tonsillar, preauricular, posterior auricular or occipital adenopathy. Cervical: No cervical adenopathy. Right cervical: No superficial, deep or posterior cervical adenopathy. Left cervical: No superficial, deep or posterior cervical adenopathy. Skin:     General: Skin is warm. Capillary Refill: Capillary refill takes less than 2 seconds. Neurological:      General: No focal deficit present.       Mental Status: She is alert and oriented to person, place, and time. GCS: GCS eye subscore is 4. GCS verbal subscore is 5. GCS motor subscore is 6. Cranial Nerves: Cranial nerves 2-12 are intact. Sensory: Sensation is intact. No sensory deficit. Motor: Motor function is intact. Coordination: Coordination is intact. Gait: Gait is intact. Deep Tendon Reflexes: Reflexes are normal and symmetric. Reflex Scores:       Patellar reflexes are 2+ on the right side and 2+ on the left side. Psychiatric:         Mood and Affect: Mood normal.         Speech: Speech normal.         Behavior: Behavior normal. Behavior is cooperative. Thought Content:  Thought content normal.         Judgment: Judgment normal.         Vital Signs  ED Triage Vitals   Temperature Pulse Respirations Blood Pressure SpO2   07/22/23 2357 07/22/23 2357 07/22/23 2357 07/22/23 2357 07/22/23 2357   98.2 °F (36.8 °C) 78 18 112/78 98 %      Temp Source Heart Rate Source Patient Position - Orthostatic VS BP Location FiO2 (%)   07/22/23 2357 07/22/23 2357 07/22/23 2357 07/22/23 2357 --   Oral Monitor Lying Right arm       Pain Score       07/23/23 0130       No Pain           Vitals:    07/22/23 2357 07/23/23 0130 07/23/23 0230   BP: 112/78 119/78 120/70   Pulse: 78 77 77   Patient Position - Orthostatic VS: Lying Lying Sitting         Visual Acuity      ED Medications  Medications   sodium chloride 0.9 % bolus 1,000 mL (0 mL Intravenous Stopped 7/23/23 0303)   ondansetron (ZOFRAN) injection 4 mg (4 mg Intravenous Given 7/23/23 0205)   cephalexin (KEFLEX) capsule 500 mg (500 mg Oral Given 7/23/23 0205)       Diagnostic Studies  Results Reviewed     Procedure Component Value Units Date/Time    TSH, 3rd generation with Free T4 reflex [[id number]]  (Normal) Collected: 07/23/23 0127    Lab Status: Final result Specimen: Blood from Arm, Left Updated: 07/23/23 0224     TSH 3RD GENERATON 1.403 uIU/mL     HS Troponin 0hr (reflex protocol) [[id number]]  (Normal) Collected: 07/23/23 0127    Lab Status: Final result Specimen: Blood from Arm, Left Updated: 07/23/23 0203     hs TnI 0hr <2 ng/L     Comprehensive metabolic panel [[id number]] Collected: 07/23/23 0127    Lab Status: Final result Specimen: Blood from Arm, Left Updated: 07/23/23 0200     Sodium 139 mmol/L      Potassium 3.6 mmol/L      Chloride 107 mmol/L      CO2 23 mmol/L      ANION GAP 9 mmol/L      BUN 11 mg/dL      Creatinine 0.68 mg/dL      Glucose 72 mg/dL      Calcium 9.4 mg/dL      AST 13 U/L      ALT 8 U/L      Alkaline Phosphatase 70 U/L      Total Protein 7.0 g/dL      Albumin 4.7 g/dL      Total Bilirubin 0.58 mg/dL      eGFR 125 ml/min/1.73sq m     Narrative:      [place] guidelines for Chronic Kidney Disease (CKD):   •  Stage 1 with normal or high GFR (GFR > 90 mL/min/1.73 square meters)  •  Stage 2 Mild CKD (GFR = 60-89 mL/min/1.73 square meters)  •  Stage 3A Moderate CKD (GFR = 45-59 mL/min/1.73 square meters)  •  Stage 3B Moderate CKD (GFR = 30-44 mL/min/1.73 square meters)  •  Stage 4 Severe CKD (GFR = 15-29 mL/min/1.73 square meters)  •  Stage 5 End Stage CKD (GFR <15 mL/min/1.73 square meters)  Note: GFR calculation is accurate only with a steady state creatinine    Magnesium [[id number]]  (Normal) Collected: 07/23/23 0127    Lab Status: Final result Specimen: Blood from Arm, Left Updated: 07/23/23 0200     Magnesium 2.1 mg/dL     Urine culture [[id number]] Collected: 07/23/23 0114    Lab Status:  In process Specimen: Urine, Clean Catch Updated: 07/23/23 0144    CBC and differential [[id number]] Collected: 07/23/23 0127    Lab Status: Final result Specimen: Blood from Arm, Left Updated: 07/23/23 0140     WBC 7.38 Thousand/uL      RBC 4.56 Million/uL      Hemoglobin 13.8 g/dL      Hematocrit 42.0 %      MCV 92 fL      MCH 30.3 pg      MCHC 32.9 g/dL      RDW 12.9 %      MPV 9.8 fL      Platelets 606 Thousands/uL      nRBC 0 /100 WBCs      Neutrophils Relative 51 %      Immat GRANS % 1 %      Lymphocytes Relative 39 %      Monocytes Relative 7 %      Eosinophils Relative 1 %      Basophils Relative 1 %      Neutrophils Absolute 3.81 Thousands/µL      Immature Grans Absolute 0.04 Thousand/uL      Lymphocytes Absolute 2.86 Thousands/µL      Monocytes Absolute 0.54 Thousand/µL      Eosinophils Absolute 0.07 Thousand/µL      Basophils Absolute 0.06 Thousands/µL     Urine Microscopic [[id number]]  (Abnormal) Collected: 07/23/23 0114    Lab Status: Final result Specimen: Urine, Clean Catch Updated: 07/23/23 0133     RBC, UA 2-4 /hpf      WBC, UA 4-10 /hpf      Epithelial Cells Occasional /hpf      Bacteria, UA Occasional /hpf      MUCUS THREADS Innumerable    POCT pregnancy, urine [[id number]]  (Normal) Resulted: 07/23/23 0127    Lab Status: Final result Updated: 07/23/23 0127     EXT Preg Test, Ur Negative     Control Valid    UA w Reflex to Microscopic w Reflex to Culture [[id number]]  (Abnormal) Collected: 07/23/23 0114    Lab Status: Final result Specimen: Urine, Clean Catch Updated: 07/23/23 0124     Color, UA Yellow     Clarity, UA Clear     Specific Gravity, UA 1.029     pH, UA 6.0     Leukocytes, UA Negative     Nitrite, UA Negative     Protein, UA Trace mg/dl      Glucose, UA Negative mg/dl      Ketones, UA Trace mg/dl      Urobilinogen, UA <2.0 mg/dl      Bilirubin, UA Negative     Occult Blood, UA Large                 XR chest 1 view portable   ED Interpretation by Kenzie Dickens PA-C (07/23 0157)   No acute cardiopulmonary disease on initial read by me. Final Result by Godfrey Chavez MD (07/23 2280)      No acute cardiopulmonary disease. Findings are stable            Workstation performed: [id number]                    Procedures  Procedures         ED Course                               SBIRT 20yo+    Flowsheet Row Most Recent Value   Initial Alcohol Screen: US AUDIT-C     1. How often do you have a drink containing alcohol? 0 Filed at: 07/23/2023 0017   2.  How many drinks containing alcohol do you have on a typical day you are drinking? 0 Filed at: 07/23/2023 0017   3a. Male UNDER 65: How often do you have five or more drinks on one occasion? 0 Filed at: 07/23/2023 0017   3b. FEMALE Any Age, or MALE 65+: How often do you have 4 or more drinks on one occassion? 0 Filed at: 07/23/2023 0017   Audit-C Score 0 Filed at: 07/23/2023 7104   ANIVAL: How many times in the past year have you. .. Used an illegal drug or used a prescription medication for non-medical reasons? Never Filed at: 07/23/2023 0017                    Medical Decision Making  Patient is a 63-year-old female with a history of hyperlipidemia, anxiety, and VSD with no significant past surgical history that presents emerged department with 1 bout of weakness stating for approximately 3 to 5 minutes at work. Patient has associated symptomatology of nausea beginning with the current presentation of generalized weakness symptoms.   Hemodynamically stable and afebrile  ECG with normal sinus rhythm, negative troponin, doubt cardiac component; patient has VSD to which she follows with cardiology as an outpatient with regularity per patient  Normal electrolytes, normal kidney function  Normal TSH, doubt acute thyroid pathology  No leukocytosis, doubt infectious etiology  Patient with normal gait  Direct supervision  GCS 15, alert and oriented x3, no focal neurological deficits  X-ray with no acute cardiopulmonary disease on initial read  Negative urine pregnancy   Urinalysis indicates likely urinary tract infection, first dose of Keflex delivered  Prescribed Keflex and counseled patient on medication ministration and side effect  Also delivered 1 L bolus of normal saline solution with patient verbalized decrease in weakness symptoms; as patient had reported earlier that she "has not eaten a lot and drank a lot the last couple days."  I instructed patient on the value, risk benefits of a full balanced meal, and drinking plenty of fluids daily  Work note delivered  Open PCP in the emergency department should symptoms persist or exacerbate  Patient demonstrates verbal understanding of all clinical laboratory and imaging findings, discharge instructions, follow-up and verbalized agreement with patient current treatment plan. Amount and/or Complexity of Data Reviewed  Labs: ordered. Decision-making details documented in ED Course. Radiology: ordered and independent interpretation performed. Decision-making details documented in ED Course. ECG/medicine tests: ordered and independent interpretation performed. Decision-making details documented in ED Course. Risk  Prescription drug management. Disposition  Final diagnoses:   UTI (urinary tract infection)   Weakness   Mild dehydration     Time reflects when diagnosis was documented in both MDM as applicable and the Disposition within this note     Time User Action Codes Description Comment    7/23/2023  2:53 AM Ahmed Rogue Add [N39.0] UTI (urinary tract infection)     7/23/2023  2:53 AM Ahmed Rogue Add [R53.1] Weakness     7/23/2023  2:53 AM Ahmed Rogue Add [E86.0] Mild dehydration       ED Disposition     ED Disposition   Discharge    Condition   Stable    Date/Time   Sun Jul 23, 2023  2:53 AM    Comment   Brian Chavez discharge to home/self care.                Follow-up Information     Follow up With Specialties Details Why Contact Info Additional Information    Santos Baker DO Family Medicine   [address] [place] [address] [address] [facility], [id number]       [address] [facility] Emergency Department Emergency Medicine   [address]  Emergency Department, [facility], [address] [facility] Cardiology [facility]  As needed; hx of  [address] [facility] [address] [facility] Bryanna Tobias, [address] [place], [place], [place]          Discharge Medication List as of 7/23/2023  2:55 AM      START taking these medications    Details   cephalexin (KEFLEX) 500 mg capsule Take 1 capsule (500 mg total) by mouth every 12 (twelve) hours for 7 days, Starting Sun 7/23/2023, Until Sun 7/30/2023, Normal         CONTINUE these medications which have NOT CHANGED    Details   !! acetaminophen (TYLENOL) 325 mg tablet Take 2 tablets (650 mg total) by mouth every 4 (four) hours as needed for mild pain or headaches, Starting Wed 11/3/2021, Normal      !! acetaminophen (TYLENOL) 325 mg tablet Take 3 tablets (975 mg total) by mouth 3 (three) times a day as needed for mild pain or fever, Starting Fri 1/6/2023, Normal      benzonatate (TESSALON PERLES) 100 mg capsule Take 1 capsule (100 mg total) by mouth every 8 (eight) hours, Starting Fri 1/6/2023, Normal      cyclobenzaprine (FLEXERIL) 10 mg tablet TAKE 1 TABLET BY MOUTH EVERY DAY NIGHTLY AS NEEDED FOR MUSCLE SPASMS, Historical Med      dextromethorphan-guaiFENesin (ROBITUSSIN DM)  mg/5 mL syrup Take 5 mL by mouth 3 (three) times a day as needed for cough, Starting Mon 6/12/2023, Normal      escitalopram (LEXAPRO) 10 mg tablet TAKE 1 TABLET BY MOUTH EVERY DAY, Normal      !! ibuprofen (MOTRIN) 600 mg tablet Take 1 tablet (600 mg total) by mouth every 6 (six) hours as needed (cramping), Starting Wed 11/3/2021, Normal      !! ibuprofen (MOTRIN) 600 mg tablet Take 1 tablet (600 mg total) by mouth every 6 (six) hours as needed for mild pain, Starting Fri 1/6/2023, Normal      medroxyPROGESTERone (DEPO-PROVERA) 150 mg/mL injection INJECT 1 ML (150 MG TOTAL) INTO A MUSCLE EVERY 3 (THREE) MONTHS, Starting Sat 3/25/2023, Normal       !! - Potential duplicate medications found. Please discuss with provider. No discharge procedures on file.     PDMP Review       Value Time User    PDMP Reviewed  Yes 7/23/2023 2:54 AM Yudi Mujica PA-C          ED Provider  Electronically Signed by           Yudi Mujica PA-C  07/23/23 8335

## 2023-07-24 LAB — BACTERIA UR CULT: NORMAL

## 2023-07-31 ENCOUNTER — TELEPHONE (OUTPATIENT)
Dept: FAMILY MEDICINE CLINIC | Facility: CLINIC | Age: 23
End: 2023-07-31

## 2023-07-31 NOTE — TELEPHONE ENCOUNTER
Patient called to see if it is too late to reschedule her depo shot.  Ok to schedule as nurse visit, please advise

## 2023-07-31 NOTE — TELEPHONE ENCOUNTER
If her last dose was on 4/18/23 she would need to have the next dose between 7/4-7/18/23 so it seems she is out of the window. It looks like Lorri ordered Depo for her in late June so if she has that she can bring it in, we will just need to do a pregnancy test in the office before we give it to her.

## 2023-08-07 ENCOUNTER — CLINICAL SUPPORT (OUTPATIENT)
Dept: FAMILY MEDICINE CLINIC | Facility: CLINIC | Age: 23
End: 2023-08-07
Payer: COMMERCIAL

## 2023-08-07 DIAGNOSIS — Z30.42 DEPO-PROVERA CONTRACEPTIVE STATUS: Primary | ICD-10-CM

## 2023-08-07 LAB — SL AMB POCT URINE HCG: NEGATIVE

## 2023-08-07 PROCEDURE — 81025 URINE PREGNANCY TEST: CPT

## 2023-08-07 PROCEDURE — 96372 THER/PROPH/DIAG INJ SC/IM: CPT

## 2023-08-07 RX ORDER — MEDROXYPROGESTERONE ACETATE 150 MG/ML
150 INJECTION, SUSPENSION INTRAMUSCULAR ONCE
Status: COMPLETED | OUTPATIENT
Start: 2023-08-07 | End: 2023-08-07

## 2023-08-07 RX ADMIN — MEDROXYPROGESTERONE ACETATE 150 MG: 150 INJECTION, SUSPENSION INTRAMUSCULAR at 15:12

## 2023-10-31 DIAGNOSIS — Z30.42 ENCOUNTER FOR SURVEILLANCE OF INJECTABLE CONTRACEPTIVE: ICD-10-CM

## 2023-11-01 RX ORDER — MEDROXYPROGESTERONE ACETATE 150 MG/ML
150 INJECTION, SUSPENSION INTRAMUSCULAR
Qty: 1 ML | Refills: 1 | Status: SHIPPED | OUTPATIENT
Start: 2023-11-01

## 2023-11-06 ENCOUNTER — OFFICE VISIT (OUTPATIENT)
Dept: FAMILY MEDICINE CLINIC | Facility: CLINIC | Age: 23
End: 2023-11-06
Payer: COMMERCIAL

## 2023-11-06 VITALS
WEIGHT: 99.6 LBS | RESPIRATION RATE: 16 BRPM | BODY MASS INDEX: 20.08 KG/M2 | SYSTOLIC BLOOD PRESSURE: 102 MMHG | HEART RATE: 91 BPM | OXYGEN SATURATION: 98 % | DIASTOLIC BLOOD PRESSURE: 70 MMHG | TEMPERATURE: 97.8 F | HEIGHT: 59 IN

## 2023-11-06 DIAGNOSIS — Z23 INFLUENZA VACCINE NEEDED: Primary | ICD-10-CM

## 2023-11-06 DIAGNOSIS — Z30.42 DEPO-PROVERA CONTRACEPTIVE STATUS: ICD-10-CM

## 2023-11-06 DIAGNOSIS — F41.9 ANXIETY: ICD-10-CM

## 2023-11-06 DIAGNOSIS — Q25.42 VSD (VENTRICULAR SEPTAL DEFECT AND AORTIC ARCH HYPOPLASIA: ICD-10-CM

## 2023-11-06 DIAGNOSIS — Q21.0 VSD (VENTRICULAR SEPTAL DEFECT AND AORTIC ARCH HYPOPLASIA: ICD-10-CM

## 2023-11-06 PROCEDURE — 99214 OFFICE O/P EST MOD 30 MIN: CPT | Performed by: FAMILY MEDICINE

## 2023-11-06 PROCEDURE — 90686 IIV4 VACC NO PRSV 0.5 ML IM: CPT

## 2023-11-06 PROCEDURE — 96372 THER/PROPH/DIAG INJ SC/IM: CPT

## 2023-11-06 PROCEDURE — 90471 IMMUNIZATION ADMIN: CPT

## 2023-11-06 RX ORDER — PAROXETINE HYDROCHLORIDE 20 MG/1
20 TABLET, FILM COATED ORAL DAILY
Qty: 60 TABLET | Refills: 0 | Status: SHIPPED | OUTPATIENT
Start: 2023-11-06

## 2023-11-06 RX ORDER — MEDROXYPROGESTERONE ACETATE 150 MG/ML
150 INJECTION, SUSPENSION INTRAMUSCULAR ONCE
Status: COMPLETED | OUTPATIENT
Start: 2023-11-06 | End: 2023-11-06

## 2023-11-06 RX ADMIN — MEDROXYPROGESTERONE ACETATE 150 MG: 150 INJECTION, SUSPENSION INTRAMUSCULAR at 15:27

## 2023-11-06 NOTE — ASSESSMENT & PLAN NOTE
Was previously on Zoloft and Lexapro, did not take either for long enough to see full benefit. Would like to try Paxil. Start at 20 mg and follow up in 6 weeks.

## 2023-11-06 NOTE — PROGRESS NOTES
Name: Monique Pak      : 2000      MRN: [id number]  Encounter Provider: Godfrey Montano DO  Encounter Date: 2023   Encounter department: [address] [place] [address]     1. Influenza vaccine needed  -     influenza vaccine, quadrivalent, 0.5 mL, preservative-free, for adult and pediatric patients 6 mos+ (AFLURIA, FLUARIX, FLULAVAL, FLUZONE)    2. Anxiety  Assessment & Plan:  Was previously on Zoloft and Lexapro, did not take either for long enough to see full benefit. Would like to try Paxil. Start at 20 mg and follow up in 6 weeks. Orders:  -     PARoxetine (PAXIL) 20 mg tablet; Take 1 tablet (20 mg total) by mouth daily    3. VSD (ventricular septal defect and aortic arch hypoplasia  Assessment & Plan:  Was last evaluated during pregnancy 2 years ago, no need to follow up yearly. 4. Depo-Provera contraceptive status  -     medroxyPROGESTERone (DEPO-PROVERA) IM injection 150 mg        Depression Screening and Follow-up Plan: Patient's depression screening was positive with a PHQ-2 score of 6. Their PHQ-9 score was 19. Subjective      HPI  Here for depo provera shot. Considering starting SSRI again, having equal anxiety and depression symptoms. Was previously on Lexapro, did not feel like it worked well for her. Was on Prozac a few years ago but does not remember how she felt on it. Tried Zoloft at one point as well.     Review of Systems    Current Outpatient Medications on File Prior to Visit   Medication Sig   • acetaminophen (TYLENOL) 325 mg tablet Take 2 tablets (650 mg total) by mouth every 4 (four) hours as needed for mild pain or headaches   • medroxyPROGESTERone (DEPO-PROVERA) 150 mg/mL injection Inject 1 mL (150 mg total) into a muscle every 3 (three) months   • [DISCONTINUED] acetaminophen (TYLENOL) 325 mg tablet Take 3 tablets (975 mg total) by mouth 3 (three) times a day as needed for mild pain or fever (Patient not taking: Reported on 2023)   • [DISCONTINUED] benzonatate (TESSALON PERLES) 100 mg capsule Take 1 capsule (100 mg total) by mouth every 8 (eight) hours (Patient not taking: Reported on 11/6/2023)   • [DISCONTINUED] cyclobenzaprine (FLEXERIL) 10 mg tablet TAKE 1 TABLET BY MOUTH EVERY DAY NIGHTLY AS NEEDED FOR MUSCLE SPASMS (Patient not taking: Reported on 11/6/2023)   • [DISCONTINUED] dextromethorphan-guaiFENesin (ROBITUSSIN DM)  mg/5 mL syrup Take 5 mL by mouth 3 (three) times a day as needed for cough (Patient not taking: Reported on 11/6/2023)   • [DISCONTINUED] ibuprofen (MOTRIN) 600 mg tablet Take 1 tablet (600 mg total) by mouth every 6 (six) hours as needed (cramping) (Patient not taking: Reported on 11/6/2023)   • [DISCONTINUED] ibuprofen (MOTRIN) 600 mg tablet Take 1 tablet (600 mg total) by mouth every 6 (six) hours as needed for mild pain (Patient not taking: Reported on 11/6/2023)       Objective     /70 (BP Location: Left arm, Patient Position: Sitting, Cuff Size: Standard)   Pulse 91   Temp 97.8 °F (36.6 °C) (Temporal)   Resp 16   Ht 4' 11" (1.499 m)   Wt 45.2 kg (99 lb 9.6 oz)   SpO2 98%   BMI 20.12 kg/m²     Physical Exam  Vitals reviewed. Constitutional:       Appearance: Normal appearance. HENT:      Head: Normocephalic. Right Ear: External ear normal.      Left Ear: External ear normal.      Nose: Nose normal.      Mouth/Throat:      Mouth: Mucous membranes are moist.      Pharynx: Oropharynx is clear. Eyes:      Extraocular Movements: Extraocular movements intact. Conjunctiva/sclera: Conjunctivae normal.   Cardiovascular:      Rate and Rhythm: Normal rate and regular rhythm. Heart sounds: Normal heart sounds. Pulmonary:      Effort: Pulmonary effort is normal.      Breath sounds: Normal breath sounds. Abdominal:      General: Abdomen is flat. Skin:     General: Skin is warm and dry. Neurological:      Mental Status: She is alert.    Psychiatric:         Mood and Affect: Mood normal.         Behavior: Behavior normal.       Smitha Freed, DO

## 2023-11-09 DIAGNOSIS — Z11.3 SCREEN FOR STD (SEXUALLY TRANSMITTED DISEASE): Primary | ICD-10-CM

## 2023-11-13 ENCOUNTER — OFFICE VISIT (OUTPATIENT)
Dept: FAMILY MEDICINE CLINIC | Facility: CLINIC | Age: 23
End: 2023-11-13
Payer: COMMERCIAL

## 2023-11-13 VITALS
WEIGHT: 99.38 LBS | BODY MASS INDEX: 20.04 KG/M2 | DIASTOLIC BLOOD PRESSURE: 60 MMHG | RESPIRATION RATE: 18 BRPM | HEIGHT: 59 IN | OXYGEN SATURATION: 96 % | TEMPERATURE: 98 F | HEART RATE: 120 BPM | SYSTOLIC BLOOD PRESSURE: 110 MMHG

## 2023-11-13 DIAGNOSIS — N89.8 VAGINAL DISCHARGE: Primary | ICD-10-CM

## 2023-11-13 PROCEDURE — 99213 OFFICE O/P EST LOW 20 MIN: CPT | Performed by: FAMILY MEDICINE

## 2023-11-13 NOTE — PROGRESS NOTES
Name: Lizy Holt      : 2000      MRN: [id number]  Encounter Provider: Abby Muñoz DO  Encounter Date: 2023   Encounter department: [address] [place] [address]     1. Vaginal discharge  Assessment & Plan:  Resolved now after one episode. No symptoms currently. STD testing ordered previously which she will complete at the lab. Likely physiologic discharge. Follow up as needed             Subjective      HPI  Just finished period, noticed some discharge when she urinated, clear to brown 2 days after sex. No itching. Discharge is resolved. No pain or odor. Urine and blood test for STDs ordered previously which she will complete.     Review of Systems    Current Outpatient Medications on File Prior to Visit   Medication Sig   • medroxyPROGESTERone (DEPO-PROVERA) 150 mg/mL injection Inject 1 mL (150 mg total) into a muscle every 3 (three) months   • PARoxetine (PAXIL) 20 mg tablet Take 1 tablet (20 mg total) by mouth daily   • acetaminophen (TYLENOL) 325 mg tablet Take 2 tablets (650 mg total) by mouth every 4 (four) hours as needed for mild pain or headaches   • [DISCONTINUED] acetaminophen (TYLENOL) 325 mg tablet Take 3 tablets (975 mg total) by mouth 3 (three) times a day as needed for mild pain or fever (Patient not taking: Reported on 2023)   • [DISCONTINUED] benzonatate (TESSALON PERLES) 100 mg capsule Take 1 capsule (100 mg total) by mouth every 8 (eight) hours (Patient not taking: Reported on 2023)   • [DISCONTINUED] cyclobenzaprine (FLEXERIL) 10 mg tablet TAKE 1 TABLET BY MOUTH EVERY DAY NIGHTLY AS NEEDED FOR MUSCLE SPASMS (Patient not taking: Reported on 2023)   • [DISCONTINUED] dextromethorphan-guaiFENesin (ROBITUSSIN DM)  mg/5 mL syrup Take 5 mL by mouth 3 (three) times a day as needed for cough (Patient not taking: Reported on 2023)   • [DISCONTINUED] ibuprofen (MOTRIN) 600 mg tablet Take 1 tablet (600 mg total) by mouth every 6 (six) hours as needed (cramping) (Patient not taking: Reported on 11/6/2023)   • [DISCONTINUED] ibuprofen (MOTRIN) 600 mg tablet Take 1 tablet (600 mg total) by mouth every 6 (six) hours as needed for mild pain (Patient not taking: Reported on 11/6/2023)       Objective     /60   Pulse (!) 120   Temp 98 °F (36.7 °C)   Resp 18   Ht 4' 11" (1.499 m)   Wt 45.1 kg (99 lb 6 oz)   SpO2 96%   BMI 20.07 kg/m²     Physical Exam  Vitals reviewed. Constitutional:       Appearance: Normal appearance. HENT:      Head: Normocephalic. Cardiovascular:      Rate and Rhythm: Normal rate. Pulmonary:      Effort: Pulmonary effort is normal.   Abdominal:      General: Abdomen is flat. Skin:     General: Skin is warm and dry. Neurological:      Mental Status: She is alert.    Psychiatric:         Mood and Affect: Mood normal.         Behavior: Behavior normal.       Ariana Esquivel DO

## 2023-11-13 NOTE — ASSESSMENT & PLAN NOTE
Resolved now after one episode. No symptoms currently. STD testing ordered previously which she will complete at the lab. Likely physiologic discharge.  Follow up as needed

## 2023-11-30 ENCOUNTER — OFFICE VISIT (OUTPATIENT)
Dept: FAMILY MEDICINE CLINIC | Facility: CLINIC | Age: 23
End: 2023-11-30
Payer: COMMERCIAL

## 2023-11-30 VITALS
BODY MASS INDEX: 20.2 KG/M2 | TEMPERATURE: 98 F | DIASTOLIC BLOOD PRESSURE: 62 MMHG | HEIGHT: 59 IN | HEART RATE: 89 BPM | SYSTOLIC BLOOD PRESSURE: 102 MMHG | OXYGEN SATURATION: 97 % | RESPIRATION RATE: 16 BRPM | WEIGHT: 100.2 LBS

## 2023-11-30 DIAGNOSIS — J06.9 UPPER RESPIRATORY TRACT INFECTION, UNSPECIFIED TYPE: Primary | ICD-10-CM

## 2023-11-30 LAB
SARS-COV-2 AG UPPER RESP QL IA: NEGATIVE
VALID CONTROL: NORMAL

## 2023-11-30 PROCEDURE — 87811 SARS-COV-2 COVID19 W/OPTIC: CPT | Performed by: FAMILY MEDICINE

## 2023-11-30 PROCEDURE — 99213 OFFICE O/P EST LOW 20 MIN: CPT | Performed by: FAMILY MEDICINE

## 2023-11-30 NOTE — LETTER
November 30, 2023     Patient: Jocelyn Woods  YOB: 2000  Date of Visit: 11/30/2023      To Whom it May Concern:    Jocelyn Woods is under my professional care. Jean Carlos Stern was seen in my office on 11/30/2023. Jean Carlos Stern may return to work on 12/5/23. Please excuse her absence due to illness on 11/28-12/4/23 . If you have any questions or concerns, please don't hesitate to call.          Sincerely,          Tata Fowler, DO        CC: No Recipients

## 2023-11-30 NOTE — ASSESSMENT & PLAN NOTE
Likely RSV as her son just tested positive. COVID negative in office today. Recommend symptom directed treatment and supportive care. Follow up if not improved or any new concerns. Work note provided.

## 2023-11-30 NOTE — PROGRESS NOTES
Name: Shaye Zamora      : 2000      MRN: [id number]  Encounter Provider: Ariana Harris DO  Encounter Date: 2023   Encounter department: [address] [place] [address]. Upper respiratory tract infection, unspecified type  Assessment & Plan:  Likely RSV as her son just tested positive. COVID negative in office today. Recommend symptom directed treatment and supportive care. Follow up if not improved or any new concerns. Work note provided. Orders:  -     POCT Rapid Covid Ag         Subjective      HPI  2 days ago started with sore throat, cough, congestion. No fever or chills. Son has RSV. Would like a COVID test to rule it out. Review of Systems    Current Outpatient Medications on File Prior to Visit   Medication Sig   • acetaminophen (TYLENOL) 325 mg tablet Take 2 tablets (650 mg total) by mouth every 4 (four) hours as needed for mild pain or headaches   • medroxyPROGESTERone (DEPO-PROVERA) 150 mg/mL injection Inject 1 mL (150 mg total) into a muscle every 3 (three) months   • PARoxetine (PAXIL) 20 mg tablet Take 1 tablet (20 mg total) by mouth daily       Objective     /62 (BP Location: Right arm, Patient Position: Sitting, Cuff Size: Standard)   Pulse 89   Temp 98 °F (36.7 °C) (Temporal)   Resp 16   Ht 4' 11" (1.499 m)   Wt 45.5 kg (100 lb 3.2 oz)   SpO2 97%   BMI 20.24 kg/m²     Physical Exam  Vitals reviewed. Constitutional:       Appearance: Normal appearance. HENT:      Head: Normocephalic and atraumatic. Right Ear: External ear normal.      Left Ear: External ear normal.      Nose: Congestion present. Mouth/Throat:      Mouth: Mucous membranes are moist.      Pharynx: Oropharynx is clear. Eyes:      Extraocular Movements: Extraocular movements intact. Conjunctiva/sclera: Conjunctivae normal.   Cardiovascular:      Rate and Rhythm: Normal rate and regular rhythm. Heart sounds: Normal heart sounds.    Pulmonary: Effort: Pulmonary effort is normal.      Breath sounds: Normal breath sounds. Abdominal:      General: Abdomen is flat. Skin:     General: Skin is warm and dry. Neurological:      Mental Status: She is alert.    Psychiatric:         Mood and Affect: Mood normal.         Behavior: Behavior normal.       Cornelia Falling, DO

## 2023-12-02 DIAGNOSIS — F41.9 ANXIETY: ICD-10-CM

## 2023-12-02 RX ORDER — PAROXETINE HYDROCHLORIDE 20 MG/1
20 TABLET, FILM COATED ORAL DAILY
Qty: 90 TABLET | Refills: 1 | Status: SHIPPED | OUTPATIENT
Start: 2023-12-02

## 2024-01-26 ENCOUNTER — HOSPITAL ENCOUNTER (EMERGENCY)
Facility: HOSPITAL | Age: 24
Discharge: HOME | End: 2024-01-26
Attending: EMERGENCY MEDICINE
Payer: COMMERCIAL

## 2024-01-26 VITALS
TEMPERATURE: 97.7 F | SYSTOLIC BLOOD PRESSURE: 118 MMHG | DIASTOLIC BLOOD PRESSURE: 59 MMHG | RESPIRATION RATE: 19 BRPM | OXYGEN SATURATION: 97 % | HEART RATE: 95 BPM

## 2024-01-26 DIAGNOSIS — K52.9 GASTROENTERITIS: Primary | ICD-10-CM

## 2024-01-26 LAB
ALBUMIN SERPL BCP-MCNC: 4.9 G/DL (ref 3.5–5)
ALP SERPL-CCNC: 87 U/L (ref 34–104)
ALT SERPL W P-5'-P-CCNC: 9 U/L (ref 7–52)
ANION GAP SERPL CALCULATED.3IONS-SCNC: 9 MMOL/L
AST SERPL W P-5'-P-CCNC: 18 U/L (ref 13–39)
BASOPHILS # BLD AUTO: 0.04 THOUSANDS/ÂΜL (ref 0–0.1)
BASOPHILS NFR BLD AUTO: 0 % (ref 0–1)
BILIRUB SERPL-MCNC: 0.56 MG/DL (ref 0.2–1)
BUN SERPL-MCNC: 14 MG/DL (ref 5–25)
CALCIUM SERPL-MCNC: 9.5 MG/DL (ref 8.4–10.2)
CHLORIDE SERPL-SCNC: 107 MMOL/L (ref 96–108)
CO2 SERPL-SCNC: 24 MMOL/L (ref 21–32)
CREAT SERPL-MCNC: 0.75 MG/DL (ref 0.6–1.3)
EOSINOPHIL # BLD AUTO: 0.07 THOUSAND/ÂΜL (ref 0–0.61)
EOSINOPHIL NFR BLD AUTO: 0 % (ref 0–6)
ERYTHROCYTE [DISTWIDTH] IN BLOOD BY AUTOMATED COUNT: 12.8 % (ref 11.6–15.1)
EXT PREGNANCY TEST URINE: NEGATIVE
EXT. CONTROL: NORMAL
GFR SERPL CREATININE-BSD FRML MDRD: 114 ML/MIN/1.73SQ M
GLUCOSE SERPL-MCNC: 79 MG/DL (ref 65–140)
HCG SERPL QL: NEGATIVE
HCT VFR BLD AUTO: 47.7 % (ref 34.8–46.1)
HGB BLD-MCNC: 15.3 G/DL (ref 11.5–15.4)
IMM GRANULOCYTES # BLD AUTO: 0.07 THOUSAND/UL (ref 0–0.2)
IMM GRANULOCYTES NFR BLD AUTO: 0 % (ref 0–2)
LIPASE SERPL-CCNC: 29 U/L (ref 11–82)
LYMPHOCYTES # BLD AUTO: 1.45 THOUSANDS/ÂΜL (ref 0.6–4.47)
LYMPHOCYTES NFR BLD AUTO: 8 % (ref 14–44)
MCH RBC QN AUTO: 30.3 PG (ref 26.8–34.3)
MCHC RBC AUTO-ENTMCNC: 32.1 G/DL (ref 31.4–37.4)
MCV RBC AUTO: 95 FL (ref 82–98)
MONOCYTES # BLD AUTO: 1.38 THOUSAND/ÂΜL (ref 0.17–1.22)
MONOCYTES NFR BLD AUTO: 8 % (ref 4–12)
NEUTROPHILS # BLD AUTO: 15 THOUSANDS/ÂΜL (ref 1.85–7.62)
NEUTS SEG NFR BLD AUTO: 84 % (ref 43–75)
NRBC BLD AUTO-RTO: 0 /100 WBCS
PLATELET # BLD AUTO: 203 THOUSANDS/UL (ref 149–390)
PMV BLD AUTO: 9.7 FL (ref 8.9–12.7)
POTASSIUM SERPL-SCNC: 3.9 MMOL/L (ref 3.5–5.3)
PROT SERPL-MCNC: 7.5 G/DL (ref 6.4–8.4)
RBC # BLD AUTO: 5.05 MILLION/UL (ref 3.81–5.12)
SODIUM SERPL-SCNC: 140 MMOL/L (ref 135–147)
WBC # BLD AUTO: 18.01 THOUSAND/UL (ref 4.31–10.16)

## 2024-01-26 PROCEDURE — 85025 COMPLETE CBC W/AUTO DIFF WBC: CPT | Performed by: EMERGENCY MEDICINE

## 2024-01-26 PROCEDURE — 80053 COMPREHEN METABOLIC PANEL: CPT | Performed by: EMERGENCY MEDICINE

## 2024-01-26 PROCEDURE — 99284 EMERGENCY DEPT VISIT MOD MDM: CPT | Performed by: EMERGENCY MEDICINE

## 2024-01-26 PROCEDURE — 81025 URINE PREGNANCY TEST: CPT | Performed by: EMERGENCY MEDICINE

## 2024-01-26 PROCEDURE — 99284 EMERGENCY DEPT VISIT MOD MDM: CPT

## 2024-01-26 PROCEDURE — 83690 ASSAY OF LIPASE: CPT | Performed by: EMERGENCY MEDICINE

## 2024-01-26 PROCEDURE — 84703 CHORIONIC GONADOTROPIN ASSAY: CPT

## 2024-01-26 PROCEDURE — 36415 COLL VENOUS BLD VENIPUNCTURE: CPT | Performed by: EMERGENCY MEDICINE

## 2024-01-26 RX ORDER — ONDANSETRON 4 MG/1
4 TABLET, ORALLY DISINTEGRATING ORAL EVERY 6 HOURS PRN
Qty: 20 TABLET | Refills: 0 | Status: SHIPPED | OUTPATIENT
Start: 2024-01-26

## 2024-01-26 RX ORDER — ONDANSETRON 4 MG/1
4 TABLET, ORALLY DISINTEGRATING ORAL ONCE
Status: COMPLETED | OUTPATIENT
Start: 2024-01-26 | End: 2024-01-26

## 2024-01-26 RX ADMIN — ONDANSETRON 4 MG: 4 TABLET, ORALLY DISINTEGRATING ORAL at 17:27

## 2024-01-26 RX ADMIN — ONDANSETRON 4 MG: 4 TABLET, ORALLY DISINTEGRATING ORAL at 20:37

## 2024-01-26 NOTE — Clinical Note
Sasha Palacios was seen and treated in our emergency department on 1/26/2024.    No restrictions            Diagnosis:     Sasha  .    She may return on this date: 01/28/2024         If you have any questions or concerns, please don't hesitate to call.      Fitz Vincent MD    ______________________________           _______________          _______________  Hospital Representative                              Date                                Time

## 2024-01-27 NOTE — ED ATTENDING ATTESTATION
1/26/2024  IKylah DO, saw and evaluated the patient. I have discussed the patient with the resident/non-physician practitioner and agree with the resident's/non-physician practitioner's findings, Plan of Care, and MDM as documented in the resident's/non-physician practitioner's note, except where noted. All available labs and Radiology studies were reviewed.  I was present for key portions of any procedure(s) performed by the resident/non-physician practitioner and I was immediately available to provide assistance.       At this point I agree with the current assessment done in the Emergency Department.  I have conducted an independent evaluation of this patient a history and physical is as follows:    ED Course   23 year old female presents to the ED for evaluation of n/v/d that started around 3pm.  Symptoms started after eating salmon for lunch.  No fever or chills.  Patient has had several episodes of nonbloody, nonbilious vomiting and nonbloody diarrhea.  She reports mild lower abdominal cramping associated with the diarrhea.  Patient denies recent known sick contacts but does work at the [employer].  Patient denies abnormal vaginal discharge or bleeding.  No dysuria or hematuria.  No flank pain.      Past medical history: Ventricular septal defect    Physical exam: Patient is awake and alert, nontoxic resting in no acute distress.  Pupils are equal and reactive.  Mucous membranes are slightly dry.  Neck is supple.  Heart is regular rate and rhythm, 3 out of 6 systolic ejection murmur.  Lungs are clear to auscultation.  Abdomen is flat, mild tenderness to palpation in the lower quadrants.  No rebound or guarding.  No CVA tenderness.  No lower extremity edema.  Skin is warm and dry.  Speech is clear and appropriate.  No focal motor or sensory deficits.    Assessment: 23-year-old female presents with acute nausea vomiting diarrhea.  Differential diagnosis includes but is not limited to acute foodborne illness,  acute gastroenteritis viral etiology, dehydration, electrolyte abnormality    Plan: Will check urine pregnancy, electrolytes, trial Zofran ODT.  If patient feeling better we will plan to p.o. challenge and reassess.          Critical Care Time  Procedures

## 2024-01-27 NOTE — ED PROVIDER NOTES
History  Chief Complaint   Patient presents with    Abdominal Pain     Pt reports abd cramping, diarrhea, vomiting starting this afternoon after lunch (pt reports she had salmon).      23-year-old female with history of VSD presents with nausea, vomiting, diarrhea.  Patient states that she developed acute onset of nausea, vomiting, diarrhea shortly after eating salmon at work.  Works at [employer] and states that salmon had been laying out under a light until ordered.  Ate salmon at approximately 1300 hrs. today with symptoms around 1600 to 1700 hrs.  NBNB vomiting X5 episodes, watery nonbloody diarrhea.  Associated constant nonradiating crampy lower abdominal pain.  Denies recent antibiotic use.  Denies sick contacts.  Irregular menses after Depo-Provera.  Denies recent travel.  Denies alcohol use or recreational drug use.  Vapes tobacco.  Denies fevers, chills, chest pain, shortness of breath, headache, vision changes, sore throat, cough, dysuria, frequency, urgency, hematuria, vaginal bleeding, constipation, leg swelling.      Abdominal Pain      Prior to Admission Medications   Prescriptions Last Dose Informant Patient Reported? Taking?   PARoxetine (PAXIL) 20 mg tablet   No No   Sig: TAKE 1 TABLET BY MOUTH EVERY DAY   acetaminophen (TYLENOL) 325 mg tablet  Self No No   Sig: Take 2 tablets (650 mg total) by mouth every 4 (four) hours as needed for mild pain or headaches   medroxyPROGESTERone (DEPO-PROVERA) 150 mg/mL injection   No No   Sig: Inject 1 mL (150 mg total) into a muscle every 3 (three) months      Facility-Administered Medications: None       Past Medical History:   Diagnosis Date    Anxiety     Heart murmur     Hyperlipidemia     Migraine     VSD (ventricular septal defect and aortic arch hypoplasia        History reviewed. No pertinent surgical history.    Family History   Problem Relation Age of Onset    No Known Problems Mother     Heart disease Father      I have reviewed and agree with the history  as documented.    E-Cigarette/Vaping    E-Cigarette Use Current Every Day User      E-Cigarette/Vaping Substances    Nicotine Yes     THC No     CBD No     Flavoring Yes     Other No     Unknown No      Social History     Tobacco Use    Smoking status: Never    Smokeless tobacco: Never    Tobacco comments:     vaping   Vaping Use    Vaping status: Every Day    Substances: Nicotine, Flavoring   Substance Use Topics    Alcohol use: No    Drug use: No        Review of Systems   Gastrointestinal:  Positive for abdominal pain.   All other systems reviewed and are negative.      Physical Exam  ED Triage Vitals [01/26/24 1709]   Temperature Pulse Respirations Blood Pressure SpO2   97.7 °F (36.5 °C) 95 19 118/59 97 %      Temp Source Heart Rate Source Patient Position - Orthostatic VS BP Location FiO2 (%)   Oral Monitor Sitting Right arm --      Pain Score       --             Orthostatic Vital Signs  Vitals:    01/26/24 1709   BP: 118/59   Pulse: 95   Patient Position - Orthostatic VS: Sitting       Physical Exam  Vitals and nursing note reviewed.   Constitutional:       General: She is not in acute distress.     Appearance: Normal appearance. She is well-developed and normal weight. She is not ill-appearing, toxic-appearing or diaphoretic.   HENT:      Head: Normocephalic and atraumatic.      Right Ear: External ear normal.      Left Ear: External ear normal.      Nose: Nose normal.      Mouth/Throat:      Mouth: Mucous membranes are moist.      Pharynx: Oropharynx is clear.   Eyes:      General: No scleral icterus.        Right eye: No discharge.         Left eye: No discharge.      Extraocular Movements: Extraocular movements intact.   Cardiovascular:      Rate and Rhythm: Normal rate and regular rhythm.      Pulses: Normal pulses.      Heart sounds: Murmur (Systolic) heard.      No friction rub. No gallop.   Pulmonary:      Effort: Pulmonary effort is normal. No respiratory distress.      Breath sounds: Normal breath  sounds. No stridor. No wheezing, rhonchi or rales.   Chest:      Chest wall: No tenderness.   Abdominal:      General: Abdomen is flat. There is no distension.      Palpations: Abdomen is soft. There is no hepatomegaly, splenomegaly, mass or pulsatile mass.      Tenderness: There is abdominal tenderness in the suprapubic area. There is no right CVA tenderness, left CVA tenderness, guarding or rebound. Negative signs include Lizarraga's sign, Rovsing's sign and McBurney's sign.      Hernia: No hernia is present.   Musculoskeletal:      Cervical back: Neck supple.   Skin:     General: Skin is warm and dry.      Capillary Refill: Capillary refill takes less than 2 seconds.      Coloration: Skin is not cyanotic, jaundiced, mottled or pale.      Findings: No bruising, erythema, lesion, petechiae or rash.   Neurological:      General: No focal deficit present.      Mental Status: She is alert and oriented to person, place, and time. Mental status is at baseline.   Psychiatric:         Mood and Affect: Mood normal.         Behavior: Behavior normal.         ED Medications  Medications   ondansetron (ZOFRAN-ODT) dispersible tablet 4 mg (4 mg Oral Given 1/26/24 1727)   ondansetron (ZOFRAN-ODT) dispersible tablet 4 mg (4 mg Oral Given 1/26/24 2037)       Diagnostic Studies  Results Reviewed       Procedure Component Value Units Date/Time    hCG, qualitative pregnancy [[id number]]  (Normal) Collected: 01/26/24 1929    Lab Status: Final result Specimen: Blood from Arm, Left Updated: 01/26/24 2026     Preg, Serum Negative    POCT pregnancy, urine [[id number]]  (Normal) Resulted: 01/26/24 2018    Lab Status: Final result Updated: 01/26/24 2018     EXT Preg Test, Ur Negative     Control Valid    Comprehensive metabolic panel [[id number]] Collected: 01/26/24 1929    Lab Status: Final result Specimen: Blood from Arm, Left Updated: 01/26/24 1956     Sodium 140 mmol/L      Potassium 3.9 mmol/L      Chloride 107 mmol/L      CO2 24 mmol/L       ANION GAP 9 mmol/L      BUN 14 mg/dL      Creatinine 0.75 mg/dL      Glucose 79 mg/dL      Calcium 9.5 mg/dL      AST 18 U/L      ALT 9 U/L      Alkaline Phosphatase 87 U/L      Total Protein 7.5 g/dL      Albumin 4.9 g/dL      Total Bilirubin 0.56 mg/dL      eGFR 114 ml/min/1.73sq m     Narrative:      National Kidney Disease Foundation guidelines for Chronic Kidney Disease (CKD):     Stage 1 with normal or high GFR (GFR > 90 mL/min/1.73 square meters)    Stage 2 Mild CKD (GFR = 60-89 mL/min/1.73 square meters)    Stage 3A Moderate CKD (GFR = 45-59 mL/min/1.73 square meters)    Stage 3B Moderate CKD (GFR = 30-44 mL/min/1.73 square meters)    Stage 4 Severe CKD (GFR = 15-29 mL/min/1.73 square meters)    Stage 5 End Stage CKD (GFR <15 mL/min/1.73 square meters)  Note: GFR calculation is accurate only with a steady state creatinine    Lipase [[id number]]  (Normal) Collected: 01/26/24 1929    Lab Status: Final result Specimen: Blood from Arm, Left Updated: 01/26/24 1956     Lipase 29 u/L     CBC and differential [[id number]]  (Abnormal) Collected: 01/26/24 1929    Lab Status: Final result Specimen: Blood from Arm, Left Updated: 01/26/24 1936     WBC 18.01 Thousand/uL      RBC 5.05 Million/uL      Hemoglobin 15.3 g/dL      Hematocrit 47.7 %      MCV 95 fL      MCH 30.3 pg      MCHC 32.1 g/dL      RDW 12.8 %      MPV 9.7 fL      Platelets 203 Thousands/uL      nRBC 0 /100 WBCs      Neutrophils Relative 84 %      Immat GRANS % 0 %      Lymphocytes Relative 8 %      Monocytes Relative 8 %      Eosinophils Relative 0 %      Basophils Relative 0 %      Neutrophils Absolute 15.00 Thousands/µL      Immature Grans Absolute 0.07 Thousand/uL      Lymphocytes Absolute 1.45 Thousands/µL      Monocytes Absolute 1.38 Thousand/µL      Eosinophils Absolute 0.07 Thousand/µL      Basophils Absolute 0.04 Thousands/µL                    No orders to display         Procedures  Procedures      ED Course                                        Medical Decision Making  23-year-old female presents with nausea, vomiting, diarrhea after eating salmon.  Differential includes but not limited to gastroenteritis, food poisoning, diarrhea, viral syndrome, pregnancy      CBC, CMP, lipase, hCG  Zofran  P.o. challenge    Patient feeling improved after Zofran.  P.o. challenge passed.  Labs notable for elevated WBC without left shift or bandemia.  Patient in no acute distress, acting appropriately, vitals within normal limits, abdomen soft nontender.    Discussed likely secondary to food poisoning versus viral gastroenteritis  Hand hygiene  Prescription for Zofran  Symptomatic therapy  ORT  Follow-up with PCP as needed  Patient discharged home to self-care with strict return precautions for signs and symptoms of appendicitis, acute abdomen, continued or worsening symptoms.  Patient understanding and agreement with plan.    Amount and/or Complexity of Data Reviewed  Labs: ordered.    Risk  Prescription drug management.          Disposition  Final diagnoses:   Gastroenteritis     Time reflects when diagnosis was documented in both MDM as applicable and the Disposition within this note       Time User Action Codes Description Comment    1/26/2024  7:15 PM Fitz Vincent Add [K52.9] Gastroenteritis           ED Disposition       ED Disposition   Discharge    Condition   Stable    Date/Time   Fri Jan 26, 2024  8:32 PM    Comment   Sasha Palacios discharge to home/self care.                   Follow-up Information       Follow up With Specialties Details Why Contact Info Additional Information    Bal Hickey DO Family Medicine Schedule an appointment as soon as possible for a visit  As needed [address] [facility] [address] [phone]       [facility] Emergency Department Emergency Medicine Go to  If symptoms worsen [address] [facility] [id number] [phone] [facility]  Emergency Department, [address] [place], [place], [address]            Discharge Medication List as of 1/26/2024  8:35 PM        START taking these medications    Details   ondansetron (ZOFRAN-ODT) 4 mg disintegrating tablet Take 1 tablet (4 mg total) by mouth every 6 (six) hours as needed for nausea or vomiting, Starting Fri 1/26/2024, Normal           CONTINUE these medications which have NOT CHANGED    Details   acetaminophen (TYLENOL) 325 mg tablet Take 2 tablets (650 mg total) by mouth every 4 (four) hours as needed for mild pain or headaches, Starting Wed 11/3/2021, Normal      medroxyPROGESTERone (DEPO-PROVERA) 150 mg/mL injection Inject 1 mL (150 mg total) into a muscle every 3 (three) months, Starting Wed 11/1/2023, Normal      PARoxetine (PAXIL) 20 mg tablet TAKE 1 TABLET BY MOUTH EVERY DAY, Starting Sat 12/2/2023, Normal           No discharge procedures on file.    PDMP Review         Value Time User    PDMP Reviewed  Yes 7/23/2023  2:54 AM Fili Thomas PA-C             ED Provider  Attending physically available and evaluated Sasha Palacios. I managed the patient along with the ED Attending.    Electronically Signed by           Fitz Vincent MD  01/26/24 3224

## 2024-02-05 ENCOUNTER — CLINICAL SUPPORT (OUTPATIENT)
Dept: FAMILY MEDICINE CLINIC | Facility: CLINIC | Age: 24
End: 2024-02-05

## 2024-02-05 DIAGNOSIS — Z30.42 DEPO-PROVERA CONTRACEPTIVE STATUS: Primary | ICD-10-CM

## 2024-02-05 PROCEDURE — 96372 THER/PROPH/DIAG INJ SC/IM: CPT

## 2024-02-05 RX ORDER — MEDROXYPROGESTERONE ACETATE 150 MG/ML
150 INJECTION, SUSPENSION INTRAMUSCULAR ONCE
Status: COMPLETED | OUTPATIENT
Start: 2024-02-05 | End: 2024-02-05

## 2024-02-05 RX ADMIN — MEDROXYPROGESTERONE ACETATE 150 MG: 150 INJECTION, SUSPENSION INTRAMUSCULAR at 15:30

## 2024-04-24 ENCOUNTER — HOSPITAL ENCOUNTER (EMERGENCY)
Facility: HOSPITAL | Age: 24
Discharge: HOME | End: 2024-04-24
Attending: EMERGENCY MEDICINE
Payer: COMMERCIAL

## 2024-04-24 VITALS
RESPIRATION RATE: 18 BRPM | OXYGEN SATURATION: 99 % | SYSTOLIC BLOOD PRESSURE: 130 MMHG | TEMPERATURE: 97.9 F | HEART RATE: 70 BPM | DIASTOLIC BLOOD PRESSURE: 76 MMHG

## 2024-04-24 DIAGNOSIS — R55 NEAR SYNCOPE: Primary | ICD-10-CM

## 2024-04-24 LAB
ATRIAL RATE: 76 BPM
EXT PREGNANCY TEST URINE: NEGATIVE
EXT. CONTROL: NORMAL
P AXIS: 67 DEGREES
PR INTERVAL: 168 MS
QRS AXIS: 56 DEGREES
QRSD INTERVAL: 82 MS
QT INTERVAL: 358 MS
QTC INTERVAL: 402 MS
T WAVE AXIS: 45 DEGREES
VENTRICULAR RATE: 76 BPM

## 2024-04-24 PROCEDURE — 93005 ELECTROCARDIOGRAM TRACING: CPT

## 2024-04-24 PROCEDURE — 99285 EMERGENCY DEPT VISIT HI MDM: CPT

## 2024-04-24 PROCEDURE — 81025 URINE PREGNANCY TEST: CPT

## 2024-04-24 PROCEDURE — 93010 ELECTROCARDIOGRAM REPORT: CPT | Performed by: INTERNAL MEDICINE

## 2024-04-24 PROCEDURE — 99284 EMERGENCY DEPT VISIT MOD MDM: CPT | Performed by: EMERGENCY MEDICINE

## 2024-04-24 NOTE — Clinical Note
Sasha Palacios was seen and treated in our emergency department on 4/24/2024.                Diagnosis:     Sasha  may return to school on return date, may return to work on return date.    She may return on this date: 04/25/2024         If you have any questions or concerns, please don't hesitate to call.      Annette Maria Palladino, DO    ______________________________           _______________          _______________  Hospital Representative                              Date                                Time

## 2024-04-24 NOTE — ED ATTENDING ATTESTATION
4/24/2024  I, Jose L Hidalgo DO, saw and evaluated the patient. I have discussed the patient with the resident/non-physician practitioner and agree with the resident's/non-physician practitioner's findings, Plan of Care, and MDM as documented in the resident's/non-physician practitioner's note, except where noted. All available labs and Radiology studies were reviewed.  I was present for key portions of any procedure(s) performed by the resident/non-physician practitioner and I was immediately available to provide assistance.       At this point I agree with the current assessment done in the Emergency Department.  I have conducted an independent evaluation of this patient a history and physical is as follows:    ED Course     24-year-old otherwise healthy female presented to ED for reports of syncope.  States she was at work today and felt nauseous and then woke up on the ground however she didn't hit the ground her friend reportedly caught her and lowered her to the ground.     Reportedly strong family history of cardiac issues.  Maternal grandfather with MI at 35.  Father who is present in the room with congestive heart failure.  Multiple other family members with MIs early in life.  Patient has a known VSD from birth for which she used to follow with cardiology although reports she has not seen cardiology in approximately 3 years.  States at this time she is feeling basically back to normal.  This reportedly happened a few years ago.    ROS: Negative unless otherwise stated above.          Physical Exam  Vitals and nursing note reviewed.   Constitutional:       General: She is not in acute distress.     Appearance: She is well-developed. She is not diaphoretic.   HENT:      Head: Normocephalic and atraumatic.      Right Ear: External ear normal.      Left Ear: External ear normal.      Nose: Nose normal.   Eyes:      General: No scleral icterus.        Right eye: No discharge.         Left eye: No  discharge.      Conjunctiva/sclera: Conjunctivae normal.      Pupils: Pupils are equal, round, and reactive to light.   Neck:      Vascular: No JVD.      Trachea: No tracheal deviation.   Cardiovascular:      Rate and Rhythm: Normal rate and regular rhythm.      Heart sounds: Murmur heard.      No friction rub. No gallop.   Pulmonary:      Effort: Pulmonary effort is normal. No respiratory distress.      Breath sounds: Normal breath sounds. No stridor. No wheezing or rales.   Abdominal:      General: Bowel sounds are normal. There is no distension.      Palpations: Abdomen is soft. There is no mass.      Tenderness: There is no abdominal tenderness. There is no guarding.   Musculoskeletal:         General: No tenderness or deformity. Normal range of motion.      Cervical back: Normal range of motion and neck supple.   Skin:     General: Skin is warm and dry.      Coloration: Skin is not pale.      Findings: No erythema or rash.   Neurological:      Mental Status: She is alert and oriented to person, place, and time.      Cranial Nerves: No cranial nerve deficit.      Sensory: No sensory deficit.      Motor: No abnormal muscle tone.   Psychiatric:         Behavior: Behavior normal.         Thought Content: Thought content normal.         Judgment: Judgment normal.       Syncope versus near syncope and otherwise healthy 24-year-old female with strong cardiac family history.  EKG and urine pregnancy.  Patient back to baseline at this time with no continuation of symptoms.  EKG shows no ST elevations or significant ST depressions concerning for acute coronary syndrome or Brugada syndrome.  No evidence of AV block tachy luisa syndrome. No significantly shortened DE interval or delta wave to suggest Sam-Parkinson-White syndrome.  No significant LVH to suggest hypertrophic cardiomyopathy.  QTC within normal limits and no epsilon wave to suggest arrhythmogenic right ventricular dysplasia.  Urine negative for pregnancy.   Referral placed for cardiology and strongly recommended follow-up with cardiology which patient and parents in the room state they will do.  Patient ambulatory out of the department without any assistance required.    Critical Care Time  Procedures

## 2024-04-25 ENCOUNTER — OFFICE VISIT (OUTPATIENT)
Dept: FAMILY MEDICINE CLINIC | Facility: CLINIC | Age: 24
End: 2024-04-25
Payer: COMMERCIAL

## 2024-04-25 VITALS — DIASTOLIC BLOOD PRESSURE: 70 MMHG | RESPIRATION RATE: 16 BRPM | SYSTOLIC BLOOD PRESSURE: 110 MMHG

## 2024-04-25 DIAGNOSIS — Q21.0 VSD (VENTRICULAR SEPTAL DEFECT): ICD-10-CM

## 2024-04-25 DIAGNOSIS — R55 SYNCOPE, UNSPECIFIED SYNCOPE TYPE: Primary | ICD-10-CM

## 2024-04-25 DIAGNOSIS — Z30.42 ENCOUNTER FOR SURVEILLANCE OF INJECTABLE CONTRACEPTIVE: ICD-10-CM

## 2024-04-25 PROCEDURE — 99213 OFFICE O/P EST LOW 20 MIN: CPT | Performed by: FAMILY MEDICINE

## 2024-04-25 NOTE — ASSESSMENT & PLAN NOTE
Syncope.  Patient will proceed with obtaining 24-hour Holter monitor.  She will also obtain an echocardiogram for comparison of prior VSD.  If patient has recurrence of symptoms she will be referred to cardiology for further evaluation.  We will make further recommendations pending results of test.

## 2024-04-25 NOTE — PROGRESS NOTES
FAMILY PRACTICE OFFICE VISIT       NAME: Sasha Palacios  AGE: 24 y.o. SEX: female       : 2000        MRN: [id number]    DATE: 2024  TIME: 12:23 PM    Assessment and Plan     Problem List Items Addressed This Visit       Syncope - Primary     Syncope.  Patient will proceed with obtaining 24-hour Holter monitor.  She will also obtain an echocardiogram for comparison of prior VSD.  If patient has recurrence of symptoms she will be referred to cardiology for further evaluation.  We will make further recommendations pending results of test.         VSD (ventricular septal defect)    Relevant Orders    Echo complete w/ contrast if indicated           Chief Complaint     Chief Complaint   Patient presents with    --ER Follow up --       History of Present Illness     Patient in the office after having ER follow-up yesterday for suspected syncopal episode at work.  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  She does not normally eat a big breakfast approximately 10 AM she felt very lightheaded and felt like she fainted.  EMS transported patient to the hospital.  Workup in the hospital did not establish any etiology.  They did order a 24-hour Holter monitor for patient to schedule.  Patient does have a history of small VSD since birth and has been followed at [facility] up until she was 21 years old.  Patient did have an echocardiogram in .        Review of Systems   Review of Systems   Constitutional: Negative.    Respiratory: Negative.     Cardiovascular: Negative.    Gastrointestinal: Negative.    Neurological:         As per HPI   Psychiatric/Behavioral: Negative.         Active Problem List     Patient Active Problem List   Diagnosis    Acne    Allergic rhinitis due to pollen    Muscular ventricular septal defect    Ventricular septal defect (VSD), conoventricular    Chronic fatigue    Persistent migraine aura without cerebral infarction  and without status migrainosus, not intractable    Anxiety    Abnormal menses    Vertigo    Encounter for surveillance of injectable contraceptive    Tension type headache    Maternal congenital cardiac anomaly, antepartum    History of penicillin allergy    Encounter for initial prescription of injectable contraceptive    Annual physical exam    VSD (ventricular septal defect and aortic arch hypoplasia    Heart murmur    Vaginal discharge    Upper respiratory tract infection    Syncope    VSD (ventricular septal defect)       Past Medical History:  Past Medical History:   Diagnosis Date    Anxiety     Heart murmur     Hyperlipidemia     Migraine     VSD (ventricular septal defect and aortic arch hypoplasia        Past Surgical History:  History reviewed. No pertinent surgical history.    Family History:  Family History   Problem Relation Age of Onset    No Known Problems Mother     Heart disease Father        Social History:  Social History     Socioeconomic History    Marital status: Single     Spouse name: Not on file    Number of children: Not on file    Years of education: Not on file    Highest education level: Not on file   Occupational History    Not on file   Tobacco Use    Smoking status: Never    Smokeless tobacco: Never    Tobacco comments:     vaping   Vaping Use    Vaping status: Every Day    Substances: Nicotine, Flavoring   Substance and Sexual Activity    Alcohol use: No    Drug use: No    Sexual activity: Yes     Partners: Male   Other Topics Concern    Not on file   Social History Narrative    Not on file     Social Determinants of Health     Financial Resource Strain: Medium Risk (3/8/2022)    Overall Financial Resource Strain (CARDIA)     Difficulty of Paying Living Expenses: Somewhat hard   Food Insecurity: No Food Insecurity (3/8/2022)    Hunger Vital Sign     Worried About Running Out of Food in the Last Year: Never true     Ran Out of Food in the Last Year: Never true   Transportation Needs:  No Transportation Needs (3/8/2022)    PRAPARE - Transportation     Lack of Transportation (Medical): No     Lack of Transportation (Non-Medical): No   Physical Activity: Not on file   Stress: Stress Concern Present (3/8/2022)    Citizen of [place] [employer] - Occupational Stress Questionnaire     Feeling of Stress : To some extent   Social Connections: Not on file   Intimate Partner Violence: Not on file   Housing Stability: Unknown (3/8/2022)    Housing Stability Vital Sign     Unable to Pay for Housing in the Last Year: No     Number of Places Lived in the Last Year: Not on file     Unstable Housing in the Last Year: No       Objective     Vitals:    04/25/24 1002   BP: 110/70   Resp: 16     Wt Readings from Last 3 Encounters:   11/30/23 45.5 kg (100 lb 3.2 oz)   11/13/23 45.1 kg (99 lb 6 oz)   11/06/23 45.2 kg (99 lb 9.6 oz)       Physical Exam  Constitutional:       General: She is not in acute distress.     Appearance: Normal appearance. She is not ill-appearing.   HENT:      Head: Normocephalic and atraumatic.      Right Ear: Tympanic membrane, ear canal and external ear normal. There is no impacted cerumen.      Left Ear: Tympanic membrane, ear canal and external ear normal. There is no impacted cerumen.   Eyes:      General:         Right eye: No discharge.         Left eye: No discharge.      Extraocular Movements: Extraocular movements intact.      Conjunctiva/sclera: Conjunctivae normal.      Pupils: Pupils are equal, round, and reactive to light.   Neck:      Vascular: No carotid bruit.   Cardiovascular:      Rate and Rhythm: Normal rate and regular rhythm.      Heart sounds: Normal heart sounds. No murmur heard.  Pulmonary:      Effort: Pulmonary effort is normal.      Breath sounds: Normal breath sounds. No wheezing, rhonchi or rales.   Musculoskeletal:      Right lower leg: No edema.      Left lower leg: No edema.   Lymphadenopathy:      Cervical: No cervical adenopathy.   Skin:      "Findings: No rash.   Neurological:      General: No focal deficit present.      Mental Status: She is alert and oriented to person, place, and time.      Cranial Nerves: No cranial nerve deficit.   Psychiatric:         Mood and Affect: Mood normal.         Behavior: Behavior normal.         Thought Content: Thought content normal.         Judgment: Judgment normal.         Pertinent Laboratory/Diagnostic Studies:  Lab Results   Component Value Date    GLUCOSE 97 10/07/2014    BUN 14 01/26/2024    CREATININE 0.75 01/26/2024    CALCIUM 9.5 01/26/2024     10/07/2014    K 3.9 01/26/2024    CO2 24 01/26/2024     01/26/2024     Lab Results   Component Value Date    ALT 9 01/26/2024    AST 18 01/26/2024    ALKPHOS 87 01/26/2024    BILITOT 0.28 10/07/2014       Lab Results   Component Value Date    WBC 18.01 (H) 01/26/2024    HGB 15.3 01/26/2024    HCT 47.7 (H) 01/26/2024    MCV 95 01/26/2024     01/26/2024       No results found for: \"TSH\"    No results found for: \"CHOL\"  Lab Results   Component Value Date    TRIG 45 05/03/2019     Lab Results   Component Value Date    HDL 53 05/03/2019     Lab Results   Component Value Date    LDLCALC 98 05/03/2019     No results found for: \"HGBA1C\"    Results for orders placed or performed during the hospital encounter of 04/24/24   POCT pregnancy, urine   Result Value Ref Range    EXT Preg Test, Ur Negative     Control Valid    ECG 12 lead   Result Value Ref Range    Ventricular Rate 76 BPM    Atrial Rate 76 BPM    NE Interval 168 ms    QRSD Interval 82 ms    QT Interval 358 ms    QTC Interval 402 ms    P Axis 67 degrees    QRS Axis 56 degrees    T Wave Axis 45 degrees       Orders Placed This Encounter   Procedures    Echo complete w/ contrast if indicated       ALLERGIES:  Allergies   Allergen Reactions    Penicillins Hives       Current Medications     Current Outpatient Medications   Medication Sig Dispense Refill    acetaminophen (TYLENOL) 325 mg tablet Take 2 " tablets (650 mg total) by mouth every 4 (four) hours as needed for mild pain or headaches 30 tablet 0    medroxyPROGESTERone (DEPO-PROVERA) 150 mg/mL injection Inject 1 mL (150 mg total) into a muscle every 3 (three) months 1 mL 1    ondansetron (ZOFRAN-ODT) 4 mg disintegrating tablet Take 1 tablet (4 mg total) by mouth every 6 (six) hours as needed for nausea or vomiting 20 tablet 0    PARoxetine (PAXIL) 20 mg tablet TAKE 1 TABLET BY MOUTH EVERY DAY 90 tablet 1     No current facility-administered medications for this visit.         Health Maintenance     Health Maintenance   Topic Date Due    Hepatitis C Screening  Never done    Hepatitis A Vaccine (1 of 2 - Risk 2-dose series) 02/20/2021    Cervical Cancer Screening  Never done    Annual Physical  09/01/2023    Chlamydia Screening  09/01/2023    COVID-19 Vaccine (1 - 2023-24 season) Never done    Depression Screening  11/06/2024    Depression Follow-up Plan  11/06/2024    DTaP,Tdap,and Td Vaccines (8 - Td or Tdap) 08/31/2031    Zoster Vaccine (1 of 2) 02/20/2052    HIV Screening  Completed    HIB Vaccine  Completed    IPV Vaccine  Completed    Meningococcal ACWY Vaccine  Completed    Influenza Vaccine  Completed    HPV Vaccine  Completed    Pneumococcal Vaccine: Pediatrics (0 to 5 Years) and At-Risk Patients (6 to 64 Years)  Aged Out     Immunization History   Administered Date(s) Administered    DTaP 2002, 2002, 2002, 05/05/2003, 04/04/2006    HPV Quadrivalent 08/19/2013, 12/12/2013    HPV9 03/15/2019    Hep B, Adolescent or Pediatric 2002, 2002, 2002    Hepatitis A 06/28/2007, 12/21/2007    HiB 2002, 2002, 2002, 05/05/2003    INFLUENZA 10/30/2007, 12/21/2007, 10/10/2008, 01/08/2010    IPV 2002, 2002, 2002, 04/04/2006    Influenza Quadrivalent Preservative Free 3 years and older IM 12/16/2014    Influenza, injectable, quadrivalent, preservative free 0.5 mL 01/30/2020, 09/14/2021, 11/06/2023     Influenza, seasonal, injectable 11/21/2012    Influenza, seasonal, injectable, preservative free 12/12/2013    MMR 02/27/2003, 04/04/2006    Meningococcal MCV4, Unspecified 08/20/2013, 03/15/2019    Meningococcal MCV4P 03/15/2019    Meningococcal, Unknown Serogroups 08/20/2013    Pneumococcal Conjugate PCV 7 2002, 2002, 02/27/2003, 08/29/2003    Tdap 08/19/2013, 08/31/2021    Tuberculin Skin Test-PPD Intradermal 03/15/2019, 03/25/2019    Varicella 02/27/2003, 06/28/2007    influenza, injectable, quadrivalent 03/15/2019       Arsenio Sapp MD

## 2024-04-26 RX ORDER — MEDROXYPROGESTERONE ACETATE 150 MG/ML
150 INJECTION, SUSPENSION INTRAMUSCULAR
Qty: 1 ML | Refills: 0 | Status: SHIPPED | OUTPATIENT
Start: 2024-04-26

## 2024-04-26 NOTE — ED PROVIDER NOTES
"History  Chief Complaint   Patient presents with    Dizziness     Hx heart murmur. Sudden onset dizziness during work. Pt unsure if she fainted; states she standing and \"everything went white\". EMS reports pt was pale and diaphoretic on arrival.        Patient is a 24-year-old female with past medical history significant for heart murmur presenting to the emergency department presyncopal episode.  Patient notes that she was at work she felt sudden onset dizziness felt like her vision was going.  Patient states that everything went white.  Per EMS patient was diaphoretic and pale upon arrival.  Point-of-care glucose within normal limits.  She denies any recent fevers or chills chest pain or shortness of any nausea vomiting diarrhea constipation dysuria hematuria.  Denies any chance of pregnancy.        Prior to Admission Medications   Prescriptions Last Dose Informant Patient Reported? Taking?   PARoxetine (PAXIL) 20 mg tablet  Self No No   Sig: TAKE 1 TABLET BY MOUTH EVERY DAY   acetaminophen (TYLENOL) 325 mg tablet  Self No No   Sig: Take 2 tablets (650 mg total) by mouth every 4 (four) hours as needed for mild pain or headaches   ondansetron (ZOFRAN-ODT) 4 mg disintegrating tablet  Self No No   Sig: Take 1 tablet (4 mg total) by mouth every 6 (six) hours as needed for nausea or vomiting      Facility-Administered Medications: None       Past Medical History:   Diagnosis Date    Anxiety     Heart murmur     Hyperlipidemia     Migraine     VSD (ventricular septal defect and aortic arch hypoplasia        History reviewed. No pertinent surgical history.    Family History   Problem Relation Age of Onset    No Known Problems Mother     Heart disease Father      I have reviewed and agree with the history as documented.    E-Cigarette/Vaping    E-Cigarette Use Current Every Day User      E-Cigarette/Vaping Substances    Nicotine Yes     THC No     CBD No     Flavoring Yes     Other No     Unknown No      Social History "     Tobacco Use    Smoking status: Never    Smokeless tobacco: Never    Tobacco comments:     vaping   Vaping Use    Vaping status: Every Day    Substances: Nicotine, Flavoring   Substance Use Topics    Alcohol use: No    Drug use: No        Review of Systems   Constitutional:  Negative for activity change, chills and fever.   HENT:  Negative for congestion, ear pain and sore throat.    Eyes:  Negative for pain and visual disturbance.   Respiratory:  Negative for cough, chest tightness and shortness of breath.    Cardiovascular:  Negative for chest pain and palpitations.   Gastrointestinal:  Negative for abdominal pain, diarrhea, nausea and vomiting.   Genitourinary:  Negative for dysuria and hematuria.   Musculoskeletal:  Negative for arthralgias, back pain and myalgias.   Skin:  Negative for color change and rash.   Neurological:  Positive for light-headedness. Negative for seizures, syncope, weakness and headaches.   Psychiatric/Behavioral:  Negative for agitation and behavioral problems.    All other systems reviewed and are negative.      Physical Exam  ED Triage Vitals [04/24/24 1033]   Temperature Pulse Respirations Blood Pressure SpO2   97.9 °F (36.6 °C) 70 18 130/76 99 %      Temp Source Heart Rate Source Patient Position - Orthostatic VS BP Location FiO2 (%)   Oral Monitor -- -- --      Pain Score       --             Orthostatic Vital Signs  Vitals:    04/24/24 1033   BP: 130/76   Pulse: 70       Physical Exam  Vitals and nursing note reviewed.   Constitutional:       General: She is not in acute distress.     Appearance: She is well-developed.   HENT:      Head: Normocephalic and atraumatic.      Right Ear: External ear normal.      Left Ear: External ear normal.      Nose: Nose normal.      Mouth/Throat:      Mouth: Mucous membranes are moist.   Eyes:      Extraocular Movements: Extraocular movements intact.      Conjunctiva/sclera: Conjunctivae normal.   Cardiovascular:      Rate and Rhythm: Normal  rate and regular rhythm.      Heart sounds: Murmur heard.   Pulmonary:      Effort: Pulmonary effort is normal. No respiratory distress.      Breath sounds: Normal breath sounds.   Abdominal:      General: Abdomen is flat.      Palpations: Abdomen is soft.      Tenderness: There is no abdominal tenderness.   Musculoskeletal:         General: No swelling.      Cervical back: Normal range of motion and neck supple.   Skin:     General: Skin is warm and dry.      Capillary Refill: Capillary refill takes less than 2 seconds.   Neurological:      General: No focal deficit present.      Mental Status: She is alert and oriented to person, place, and time.   Psychiatric:         Mood and Affect: Mood normal.         Behavior: Behavior normal.         ED Medications  Medications - No data to display    Diagnostic Studies  Results Reviewed       Procedure Component Value Units Date/Time    POCT pregnancy, urine [[id number]]  (Normal) Resulted: 04/24/24 1055    Lab Status: Final result Updated: 04/24/24 1055     EXT Preg Test, Ur Negative     Control Valid                   No orders to display         Procedures  ECG 12 Lead Documentation Only    Date/Time: 4/24/2024 10:35 AM    Performed by: Annette Maria Palladino, DO  Authorized by: Annette Maria Palladino, DO    Indications / Diagnosis:  Presyncope  ECG reviewed by me, the ED Provider: yes    Patient location:  ED  Previous ECG:     Previous ECG:  Compared to current    Similarity:  No change    Comparison to cardiac monitor: No    Interpretation:     Interpretation: normal    Rate:     ECG rate:  76    ECG rate assessment: normal    Rhythm:     Rhythm: sinus rhythm    Ectopy:     Ectopy: none    QRS:     QRS axis:  Normal    QRS intervals:  Normal  Conduction:     Conduction: normal    ST segments:     ST segments:  Normal  T waves:     T waves: normal          ED Course  ED Course as of 04/26/24 0928 Wed Apr 24, 2024   1057 PREGNANCY TEST URINE: Negative                                        Medical Decision Making  Impression: Presyncopal episode    Screening EKG negative for acute pathology.  Point-of-care urine pregnancy negative.  Patient advised to see cardiology referral was placed.  Instructed to call and schedule an appointment.  Holter monitor ordered advised to go and schedule Holter monitor appointment.     EKG does not suggest:  Ischemia (MORRIS, or DeWinters T waves).  Wellens (symetrically deeply inverted T waves in V2 and V3 or Biphasic T waves in V2, V3)  Heart block  ARVD (epsilon wave, large TWI, localized qrs widening of 110ms in V1-V3, paroxysmal episodes of VT, prolonged s wave upstroke in v1-v3)  HOCM (massive TWI, dagger Qs in lateral/inferior leads)  Arrhythmia  WPW (delta wave)  Long QT syndrome from drugs such as TCAs, fluconazole, Reglan, methadone, SSRIs etc  Short QT  Brugada (coved st elevation with TWI, saddleback MORRIS or a small amount of MORRIS)    PCP follow-up advised cardiology follow-up advised.  Return precautions given.  Patient and her mother aware no questions or concerns stable for discharge.        Amount and/or Complexity of Data Reviewed  Labs: ordered. Decision-making details documented in ED Course.          Disposition  Final diagnoses:   Near syncope     Time reflects when diagnosis was documented in both MDM as applicable and the Disposition within this note       Time User Action Codes Description Comment    4/24/2024 10:35 AM Palladino, Annette Add [R55] Near syncope           ED Disposition       ED Disposition   Discharge    Condition   Stable    Date/Time   Wed Apr 24, 2024 10:35 AM    Comment   Sasha Palacios discharge to home/self care.                   Follow-up Information       Follow up With Specialties Details Why Contact Info Additional Information    Bal Hickey,  Family Medicine Schedule an appointment as soon as possible for a visit  for follow up [address] [place] [address] [phone]        [facility] Emergency Department Emergency Medicine Go to  As needed, If symptoms worsen [address] [facility] [id number] [phone] [facility] Emergency Department, [address] [place], [place], [address]   [phone]    [facility] Cardiology [facility] Cardiology Schedule an appointment as soon as possible for a visit  for follow up [address]  [facility] [phone] [facility], [address], [place], [phone]            Discharge Medication List as of 4/24/2024 10:58 AM        CONTINUE these medications which have NOT CHANGED    Details   acetaminophen (TYLENOL) 325 mg tablet Take 2 tablets (650 mg total) by mouth every 4 (four) hours as needed for mild pain or headaches, Starting Wed 11/3/2021, Normal      ondansetron (ZOFRAN-ODT) 4 mg disintegrating tablet Take 1 tablet (4 mg total) by mouth every 6 (six) hours as needed for nausea or vomiting, Starting Fri 1/26/2024, Normal      PARoxetine (PAXIL) 20 mg tablet TAKE 1 TABLET BY MOUTH EVERY DAY, Starting Sat 12/2/2023, Normal      medroxyPROGESTERone (DEPO-PROVERA) 150 mg/mL injection Inject 1 mL (150 mg total) into a muscle every 3 (three) months, Starting Wed 11/1/2023, Normal           Outpatient Discharge Orders   Ambulatory Referral to Cardiology   Standing Status: Future Standing Exp. Date: 04/24/25      Holter monitor   Standing Status: Future Standing Exp. Date: 04/24/28       PDMP Review         Value Time User    PDMP Reviewed  Yes 7/23/2023  2:54 AM Fili Thomas PA-C             ED Provider  Attending physically available and evaluated Sasha Palacios. I managed the patient along with the ED Attending.    Electronically Signed by           Annette Maria Palladino, DO  04/26/24 0928

## 2024-05-07 ENCOUNTER — CLINICAL SUPPORT (OUTPATIENT)
Dept: FAMILY MEDICINE CLINIC | Facility: CLINIC | Age: 24
End: 2024-05-07
Payer: COMMERCIAL

## 2024-05-07 DIAGNOSIS — Z30.42 ENCOUNTER FOR SURVEILLANCE OF INJECTABLE CONTRACEPTIVE: Primary | ICD-10-CM

## 2024-05-07 PROCEDURE — 96372 THER/PROPH/DIAG INJ SC/IM: CPT | Performed by: FAMILY MEDICINE

## 2024-05-07 RX ORDER — MEDROXYPROGESTERONE ACETATE 150 MG/ML
150 INJECTION, SUSPENSION INTRAMUSCULAR ONCE
Status: COMPLETED | OUTPATIENT
Start: 2024-05-07 | End: 2024-05-07

## 2024-05-07 RX ADMIN — MEDROXYPROGESTERONE ACETATE 150 MG: 150 INJECTION, SUSPENSION INTRAMUSCULAR at 11:08

## 2024-05-21 ENCOUNTER — CONSULT (OUTPATIENT)
Dept: CARDIOLOGY CLINIC | Facility: CLINIC | Age: 24
End: 2024-05-21
Payer: COMMERCIAL

## 2024-05-21 VITALS
HEIGHT: 59 IN | SYSTOLIC BLOOD PRESSURE: 108 MMHG | OXYGEN SATURATION: 100 % | HEART RATE: 99 BPM | BODY MASS INDEX: 20.28 KG/M2 | WEIGHT: 100.6 LBS | DIASTOLIC BLOOD PRESSURE: 78 MMHG

## 2024-05-21 DIAGNOSIS — Q21.0 VSD (VENTRICULAR SEPTAL DEFECT AND AORTIC ARCH HYPOPLASIA: ICD-10-CM

## 2024-05-21 DIAGNOSIS — R55 NEAR SYNCOPE: ICD-10-CM

## 2024-05-21 DIAGNOSIS — Q25.42 VSD (VENTRICULAR SEPTAL DEFECT AND AORTIC ARCH HYPOPLASIA: ICD-10-CM

## 2024-05-21 PROCEDURE — 99244 OFF/OP CNSLTJ NEW/EST MOD 40: CPT | Performed by: INTERNAL MEDICINE

## 2024-05-21 NOTE — PROGRESS NOTES
Consultation - Cardiology   Sasha Palacios 24 y.o. female MRN: [id number]  Unit/Bed#:  Encounter: [id number]      Assessment:      Small restrictive conoventricular septal defect per OSH hospital reports: Per outside hospital records otherwise unremarkable echo.  These hemodynamically stable on examination with JVP 7 cmH2O, clear lungs bilaterally no RV heave, no abdominal fullness swelling, and no lower extremity swelling.  She does have a 3 out of 6 systolic murmur that is continuous throughout systole.  This sounds similar to what it was previously per previous notes.   -Patient reports a few episodes of what she was told vasovagal syncope in the past.  Patient states more recently she went to the emergency room after she felt very nauseous and then subsequent had a episode of syncope.  She was caught by one of her co-workers and she did not strike her head.  And significant findings on her EKG and blood work in the emergency room per documentation.  She has not had any recurrent symptoms.  -Per story still probably syncope related to vasovagal event this time stimulated by significant nausea.  I discussed with the patient prevention measures of vasovagal syncope including preventing keeping her legs being locked for a along time (which tends to happen for her at work), preventing any triggers such as nausea in this case, compression stockings and being very well-hydrated.   -EKG appears sinus with some early repolarization that is normal. Does not meet criteria for atrial enlargement. No phenotype of QTC and no evidenc of LVH or RVH. No other anterior T wave inversions or epsilon waves.   -would follow-up with holter and we discussed a low threshold for longer monitoring depending on findings  -would get TTE so we have documentation of the VSD   -She should receive SBE prophylaxis given the fact that her shunt is high velocity per reports and so close to the aortic and tricuspid valves.   -follow-up in 1 year  and probably can extend it to every 2 years          History of Present Illness   Physician Requesting Consult: No att. providers found  Reason for Consult / Principal Problem: VSD  HPI: Sasha Palacios is a 24 y.o. year old female past medical history of small restrictive VSD that cardiology clinic.      She was cardiology at [facility] few years ago now.  She noted to have a small ASD that was clinically insignificant and did not require any intervention.  She also reports that she has had a few episodes of vasovagal syncope in the past.  She is overall been doing very well and denies any cardiopulmonary complaints. Denies fevers/chills, nausea/vomiting, abdominal pain, diarrhea, cough, chest pain, shortness of breath, PND, orthopnea, presyncopal symptoms, syncopal episodes.  However, recently she states she had another episode of syncope which prompted her .  Seek further medical evaluation in the emergency room.  She was standing at work for long hours and then suddenly had some nausea which then was followed by passing out and her coworker texting her.  It took her 5 to 10 minutes to feel fully back to normal.  In the emergency room she was found to have nonischemic EKGs or other EKG and lab abnormalities.  She was discharged to have follow-up with cardiology and had a Holter monitor.  She has not had any recurrent symptoms or episodes since his initial.  Denies any tobacco use, drug use and alcohol use.     She does state that her grandfather had a heart attack at the age of 35. Denies any other significant cardiac history in her mother or father.  She does not know of any sudden cardiac death in the family.    Denies any drug use, tobacco use, excessive alcohol.  Consults    Review of Systems:  Review of Systems    14 systems reviewed and negative with the exception of the above and the following    Historical Information   Past Medical History:   Diagnosis Date    Anxiety     Heart murmur      "Hyperlipidemia     Migraine     VSD (ventricular septal defect and aortic arch hypoplasia      No past surgical history on file.  Social History     Substance and Sexual Activity   Alcohol Use No     Social History     Substance and Sexual Activity   Drug Use No     Social History     Tobacco Use   Smoking Status Never   Smokeless Tobacco Never   Tobacco Comments    vaping     Family History: non-contributory    Meds/Allergies   all current active meds have been reviewed  Allergies   Allergen Reactions    Penicillins Hives       Objective   Vitals: Blood pressure 108/78, pulse 99, height 4' 11\" (1.499 m), weight 45.6 kg (100 lb 9.6 oz), SpO2 100%, not currently breastfeeding., Body mass index is 20.32 kg/m².,     [unfilled]    Invasive Devices       None                       Physical Exam:  Physical Exam    Gen: No acute distress  HEENT: anicteric, mucous membranes moist,   Heart: no carotid bruits, JVP 7 with no ajr, no heaves, regular, normal s1 and s2, III/VI systolic murmur at sternal border  Lungs :clear to auscultation bilaterally, no rales/rhonchi or wheeze  Abdomen: soft nontender, normoactive bowel sounds, no organomegaly  Ext: warm and perfused, normal femoral pulses, no edema, clubbing  Skin: warm, no rashes  Neuro: AAO x 3, no focal findings  Psychiatric: normal affect  Musculoskeletal: no obvious joint deformities.    Lab Results:     Lab Results   Component Value Date    CKTOTAL 86 10/07/2014       Lab Results   Component Value Date    GLUCOSE 97 10/07/2014    CALCIUM 9.5 01/26/2024     10/07/2014    K 3.9 01/26/2024    CO2 24 01/26/2024     01/26/2024    BUN 14 01/26/2024    CREATININE 0.75 01/26/2024       Lab Results   Component Value Date    WBC 18.01 (H) 01/26/2024    HGB 15.3 01/26/2024    HCT 47.7 (H) 01/26/2024    MCV 95 01/26/2024     01/26/2024       No results found for: \"CHOL\"  Lab Results   Component Value Date    HDL 53 05/03/2019     Lab Results   Component Value Date "    LDLCALC 98 05/03/2019     Lab Results   Component Value Date    TRIG 45 05/03/2019       Lab Results   Component Value Date    ALT 9 01/26/2024    AST 18 01/26/2024    ALKPHOS 87 01/26/2024               Imaging: I have personally reviewed pertinent reports.      EKG: NSR with early repolarization

## 2024-07-15 DIAGNOSIS — Z30.42 ENCOUNTER FOR SURVEILLANCE OF INJECTABLE CONTRACEPTIVE: ICD-10-CM

## 2024-07-15 RX ORDER — MEDROXYPROGESTERONE ACETATE 150 MG/ML
150 INJECTION, SUSPENSION INTRAMUSCULAR
Qty: 1 ML | Refills: 0 | Status: SHIPPED | OUTPATIENT
Start: 2024-07-15

## 2024-08-05 ENCOUNTER — CLINICAL SUPPORT (OUTPATIENT)
Dept: FAMILY MEDICINE CLINIC | Facility: CLINIC | Age: 24
End: 2024-08-05
Payer: COMMERCIAL

## 2024-08-05 DIAGNOSIS — Z30.42 ENCOUNTER FOR SURVEILLANCE OF INJECTABLE CONTRACEPTIVE: Primary | ICD-10-CM

## 2024-08-05 PROCEDURE — 96372 THER/PROPH/DIAG INJ SC/IM: CPT | Performed by: FAMILY MEDICINE

## 2024-08-05 RX ORDER — MEDROXYPROGESTERONE ACETATE 150 MG/ML
150 INJECTION, SUSPENSION INTRAMUSCULAR ONCE
Status: COMPLETED | OUTPATIENT
Start: 2024-08-05 | End: 2024-08-05

## 2024-08-05 RX ADMIN — MEDROXYPROGESTERONE ACETATE 150 MG: 150 INJECTION, SUSPENSION INTRAMUSCULAR at 15:35

## 2024-08-06 ENCOUNTER — OFFICE VISIT (OUTPATIENT)
Dept: URGENT CARE | Age: 24
End: 2024-08-06
Payer: COMMERCIAL

## 2024-08-06 VITALS
TEMPERATURE: 97.5 F | RESPIRATION RATE: 16 BRPM | OXYGEN SATURATION: 98 % | HEART RATE: 87 BPM | SYSTOLIC BLOOD PRESSURE: 109 MMHG | DIASTOLIC BLOOD PRESSURE: 78 MMHG

## 2024-08-06 DIAGNOSIS — H92.01 RIGHT EAR PAIN: Primary | ICD-10-CM

## 2024-08-06 PROCEDURE — G0382 LEV 3 HOSP TYPE B ED VISIT: HCPCS

## 2024-08-06 PROCEDURE — S9083 URGENT CARE CENTER GLOBAL: HCPCS

## 2024-08-06 RX ORDER — FLUTICASONE PROPIONATE 50 MCG
1 SPRAY, SUSPENSION (ML) NASAL DAILY
Qty: 11.1 ML | Refills: 0 | Status: SHIPPED | OUTPATIENT
Start: 2024-08-06

## 2024-08-06 NOTE — PROGRESS NOTES
[facility]        NAME: Sasha Palacios is a 24 y.o. female  : 2000    MRN: [id number]  DATE: 2024  TIME: 6:06 PM    Assessment and Plan   Right ear pain [H92.01]  1. Right ear pain  fluticasone (FLONASE) 50 mcg/act nasal spray      No signs of ear infection or other acute inner ear process at this time.   Please begin Flonase and Sudafed as directed. May also trial daily allergy medication.   Follow up with PCP if no relief within one week.       Patient Instructions   Patient Education     Ear Pain ED   General Information   You came to the Emergency Department (ED) for ear pain. Many different things can cause ear pain.  What care is needed at home?   Call your regular doctor to let them know you were in the ED. Make a follow-up appointment if you were told to.  You may want to take drugs like ibuprofen, naproxen, or acetaminophen to help with pain.  If it helps you feel better, you can hold a cool, wet washcloth on the outside of your ear.  When do I need to get emergency help?   Return to the ED if:   Your face looks uneven or droops on 1 side.  You are very confused or cannot function normally.  You have trouble walking or are unsteady.  You are too weak to stand.  When do I need to call the doctor?   You have a fever of 100.4°F (38°C) or higher, or chills.  Your pain gets worse.  You have redness behind your ear (this can be harder to see on dark skin).  You have swelling behind your ear.  Your outer ear is painful and swollen.  You have blisters inside your ear.  You have fluid, pus, or blood draining from your ear  You have new or worsening symptoms.  Last Reviewed Date   2023  Consumer Information Use and Disclaimer   This generalized information is a limited summary of diagnosis, treatment, and/or medication information. It is not meant to be comprehensive and should be used as a tool to help the user understand and/or assess potential diagnostic and treatment options. It  does NOT include all information about conditions, treatments, medications, side effects, or risks that may apply to a specific patient. It is not intended to be medical advice or a substitute for the medical advice, diagnosis, or treatment of a health care provider based on the health care provider's examination and assessment of a patient’s specific and unique circumstances. Patients must speak with a health care provider for complete information about their health, medical questions, and treatment options, including any risks or benefits regarding use of medications. This information does not endorse any treatments or medications as safe, effective, or approved for treating a specific patient. UpToDate, Inc. and its affiliates disclaim any warranty or liability relating to this information or the use thereof. The use of this information is governed by the Terms of Use, available at https://www.[employer]/en/know/clinical-effectiveness-terms   Copyright   Copyright © 2024 UpToDate, Inc. and its affiliates and/or licensors. All rights reserved.       Follow up with PCP in 3-5 days.  Proceed to  ER if symptoms worsen.    Chief Complaint     Chief Complaint   Patient presents with    Earache     Two days of right ear pain.         History of Present Illness       Earache   There is pain in the right ear. This is a new problem. The current episode started in the past 7 days (x 2 days). The problem occurs constantly. The problem has been unchanged. There has been no fever. Pertinent negatives include no abdominal pain, coughing, diarrhea, ear discharge, headaches, hearing loss, neck pain, rash, rhinorrhea, sore throat or vomiting. She has tried nothing for the symptoms. The treatment provided no relief. There is no history of a chronic ear infection, hearing loss or a tympanostomy tube.       Review of Systems   Review of Systems   Constitutional:  Negative for fatigue and fever.   HENT:  Positive for ear pain.  Negative for congestion, ear discharge, hearing loss, postnasal drip, rhinorrhea, sinus pressure, sinus pain, sneezing and sore throat.    Eyes: Negative.  Negative for pain, discharge, redness and itching.   Respiratory: Negative.  Negative for apnea, cough, choking, chest tightness, shortness of breath, wheezing and stridor.    Cardiovascular: Negative.  Negative for chest pain and palpitations.   Gastrointestinal: Negative.  Negative for abdominal pain, diarrhea, nausea and vomiting.   Endocrine: Negative.  Negative for polydipsia, polyphagia and polyuria.   Genitourinary: Negative.  Negative for decreased urine volume and flank pain.   Musculoskeletal: Negative.  Negative for arthralgias, back pain, myalgias, neck pain and neck stiffness.   Skin: Negative.  Negative for color change and rash.   Allergic/Immunologic: Negative.  Negative for environmental allergies.   Neurological: Negative.  Negative for dizziness, facial asymmetry, light-headedness, numbness and headaches.   Hematological:  Positive for adenopathy.   Psychiatric/Behavioral: Negative.           Current Medications       Current Outpatient Medications:     acetaminophen (TYLENOL) 325 mg tablet, Take 2 tablets (650 mg total) by mouth every 4 (four) hours as needed for mild pain or headaches, Disp: 30 tablet, Rfl: 0    fluticasone (FLONASE) 50 mcg/act nasal spray, 1 spray into each nostril daily, Disp: 11.1 mL, Rfl: 0    medroxyPROGESTERone (DEPO-PROVERA) 150 mg/mL injection, INJECT 1 ML (150 MG TOTAL) INTO A MUSCLE EVERY 3 (THREE) MONTHS, Disp: 1 mL, Rfl: 0    PARoxetine (PAXIL) 20 mg tablet, TAKE 1 TABLET BY MOUTH EVERY DAY, Disp: 90 tablet, Rfl: 1    ondansetron (ZOFRAN-ODT) 4 mg disintegrating tablet, Take 1 tablet (4 mg total) by mouth every 6 (six) hours as needed for nausea or vomiting (Patient not taking: Reported on 8/6/2024), Disp: 20 tablet, Rfl: 0    Current Allergies     Allergies as of 08/06/2024 - Reviewed 08/06/2024   Allergen  Reaction Noted    Penicillins Hives 12/13/2016            The following portions of the patient's history were reviewed and updated as appropriate: allergies, current medications, past family history, past medical history, past social history, past surgical history and problem list.     Past Medical History:   Diagnosis Date    Anxiety     Heart murmur     Hyperlipidemia     Migraine     VSD (ventricular septal defect and aortic arch hypoplasia        No past surgical history on file.    Family History   Problem Relation Age of Onset    No Known Problems Mother     Heart disease Father          Medications have been verified.        Objective   /78   Pulse 87   Temp 97.5 °F (36.4 °C)   Resp 16   SpO2 98%        Physical Exam     Physical Exam  Vitals and nursing note reviewed.   Constitutional:       General: She is not in acute distress.     Appearance: Normal appearance. She is not ill-appearing, toxic-appearing or diaphoretic.      Interventions: She is not intubated.  HENT:      Head: Normocephalic and atraumatic.      Right Ear: Tympanic membrane, ear canal and external ear normal. There is no impacted cerumen.      Left Ear: Tympanic membrane, ear canal and external ear normal. There is no impacted cerumen.      Nose: Nose normal. No congestion or rhinorrhea.      Mouth/Throat:      Mouth: Mucous membranes are moist.      Pharynx: Oropharynx is clear. Uvula midline. No pharyngeal swelling, oropharyngeal exudate, posterior oropharyngeal erythema, uvula swelling or postnasal drip.      Tonsils: No tonsillar exudate or tonsillar abscesses. 1+ on the right. 1+ on the left.   Eyes:      Extraocular Movements: Extraocular movements intact.      Conjunctiva/sclera: Conjunctivae normal.      Pupils: Pupils are equal, round, and reactive to light.   Neck:      Thyroid: No thyroid mass, thyromegaly or thyroid tenderness.   Cardiovascular:      Rate and Rhythm: Normal rate and regular rhythm.      Pulses:  Normal pulses.      Heart sounds: S1 normal and S2 normal. Heart sounds not distant. Murmur heard.      Systolic murmur is present with a grade of 1/6.      No friction rub. No gallop.      Comments: Holosystolic murmur on exam, (+) h/o VSD, patient already aware.   Pulmonary:      Effort: Pulmonary effort is normal. No tachypnea, bradypnea, accessory muscle usage, prolonged expiration, respiratory distress or retractions. She is not intubated.      Breath sounds: Normal breath sounds. No stridor, decreased air movement or transmitted upper airway sounds. No decreased breath sounds, wheezing, rhonchi or rales.   Abdominal:      General: Bowel sounds are normal.      Palpations: Abdomen is soft.      Tenderness: There is no abdominal tenderness. There is no guarding or rebound.   Musculoskeletal:         General: Normal range of motion.      Cervical back: Full passive range of motion without pain, normal range of motion and neck supple. No rigidity or tenderness. No spinous process tenderness or muscular tenderness. Normal range of motion.   Lymphadenopathy:      Cervical: Cervical adenopathy present.      Right cervical: Superficial cervical adenopathy present.      Left cervical: No superficial cervical adenopathy.   Skin:     General: Skin is warm and dry.      Capillary Refill: Capillary refill takes less than 2 seconds.   Neurological:      General: No focal deficit present.      Mental Status: She is alert and oriented to person, place, and time.      Cranial Nerves: No cranial nerve deficit.   Psychiatric:         Mood and Affect: Mood normal.         Behavior: Behavior normal.

## 2024-08-06 NOTE — LETTER
August 6, 2024     Patient: Sasha Palacios   YOB: 2000   Date of Visit: 8/6/2024       To Whom it May Concern:    Sasha Palacios was seen in my clinic on 8/6/2024. She may return on 08/08/2024.     If you have any questions or concerns, please don't hesitate to call.         Sincerely,          ANGELA Hull        CC: No Recipients

## 2024-08-06 NOTE — PATIENT INSTRUCTIONS
No signs of ear infection or other acute inner ear process at this time.   Please begin Flonase and Sudafed as directed. May also trial daily allergy medication.   Follow up with PCP if no relief within one week.

## 2024-08-19 DIAGNOSIS — H92.01 RIGHT EAR PAIN: ICD-10-CM

## 2024-08-20 ENCOUNTER — TELEPHONE (OUTPATIENT)
Age: 24
End: 2024-08-20

## 2024-08-20 NOTE — TELEPHONE ENCOUNTER
Patient called stating she would has a membership to [employer] and would like to cancel due to massages making her pass out. They are unwilling to cancel unless she has letter from PCP.  Patient would like to know if PCP will write letter stating it should be cancelled due to passing out.  Please advise.

## 2024-08-20 NOTE — TELEPHONE ENCOUNTER
Left detailed message for patient letting her know letter was written and uploaded into [employer]

## 2024-09-25 ENCOUNTER — OFFICE VISIT (OUTPATIENT)
Dept: URGENT CARE | Age: 24
End: 2024-09-25
Payer: COMMERCIAL

## 2024-09-25 VITALS
RESPIRATION RATE: 16 BRPM | TEMPERATURE: 97.7 F | HEART RATE: 87 BPM | DIASTOLIC BLOOD PRESSURE: 81 MMHG | OXYGEN SATURATION: 100 % | SYSTOLIC BLOOD PRESSURE: 115 MMHG

## 2024-09-25 DIAGNOSIS — J06.9 UPPER RESPIRATORY TRACT INFECTION, UNSPECIFIED TYPE: Primary | ICD-10-CM

## 2024-09-25 PROCEDURE — G0382 LEV 3 HOSP TYPE B ED VISIT: HCPCS

## 2024-09-25 PROCEDURE — S9083 URGENT CARE CENTER GLOBAL: HCPCS

## 2024-09-25 NOTE — LETTER
September 25, 2024     Patient: Sasha Palacios   YOB: 2000   Date of Visit: 9/25/2024       To Whom it May Concern:    Sasha Palacios was seen in my clinic on 9/25/2024. She may return on 09/27/2024 or when fever-free for 24 hours without medication.     If you have any questions or concerns, please don't hesitate to call.         Sincerely,          ANGELA Hull        CC: No Recipients

## 2024-09-25 NOTE — PATIENT INSTRUCTIONS
Symptoms likely viral in etiology.   Please ensure good hydration, continue OTC decongestants, and alternate Tylenol and Ibuprofen as needed for fever/body aches.   Follow up with PCP if no relief within one week.

## 2024-09-25 NOTE — PROGRESS NOTES
[facility]        NAME: Sasha Palacios is a 24 y.o. female  : 2000    MRN: [id number]  DATE: 2024  TIME: 10:45 AM    Assessment and Plan   Upper respiratory tract infection, unspecified type [J06.9]  1. Upper respiratory tract infection, unspecified type        Symptoms likely viral in etiology.   Please ensure good hydration, continue OTC decongestants, and alternate Tylenol and Ibuprofen as needed for fever/body aches.   Follow up with PCP if no relief within one week.       Patient Instructions   Patient Education     Upper Respiratory Infection ED   General Information   You came to the Emergency Department (ED) for an upper respiratory infection or URI. A URI can affect your nose, throat, ears, and sinuses. A virus is the cause of almost all URIs and antibiotics will not help you feel better more quickly. The common cold is an example of a viral URI.  URIs are easy to spread from person to person, most often through coughing or sneezing. A URI will almost always get better in a week or two without any treatment.  What care is needed at home?   Call your regular doctor to let them know you were in the ED. Make a follow-up appointment if you were told to.  If you smoke, try to quit. Your doctor or nurse can help.  Drink lots of fluids like water, juice, or broth. This will help replace any fluids lost if you have a runny nose or fever. Warm tea or soup can help soothe a sore throat.  If the air in your home feels dry, use a cool mist humidifier. This can help a stuffy nose and make it easier to breathe.  You can also use saline nose drops or spray to relieve stuffiness.  If you decide to take over-the-counter cough or cold medicines, follow the directions on the label carefully. Be sure you do not take more than 1 medicine that contains acetaminophen. Also, if you have a heart problem or high blood pressure, check with your doctor before you take any of these medicines.  Wash your  hands often. Cough or sneeze into a tissue or your elbow instead of your hands. When around others, you might also want to wear a face mask. These steps can help keep others around you healthy.  When do I need to get emergency help?   Return to the ED if:   You have trouble breathing when talking or sitting still.  When do I need to call the doctor?   You have a fever of 100.4°F (38°C) or higher for several days, chills, a very bad sore throat, or ear or sinus pain.  You develop a new fever after several days of feeling the same or improving.  You develop chest pain when you cough.  You have a cough that lasts more than 10 days.  You cough up blood.  You have new or worsening symptoms.  Last Reviewed Date   2022-02-01  Consumer Information Use and Disclaimer   This generalized information is a limited summary of diagnosis, treatment, and/or medication information. It is not meant to be comprehensive and should be used as a tool to help the user understand and/or assess potential diagnostic and treatment options. It does NOT include all information about conditions, treatments, medications, side effects, or risks that may apply to a specific patient. It is not intended to be medical advice or a substitute for the medical advice, diagnosis, or treatment of a health care provider based on the health care provider's examination and assessment of a patient’s specific and unique circumstances. Patients must speak with a health care provider for complete information about their health, medical questions, and treatment options, including any risks or benefits regarding use of medications. This information does not endorse any treatments or medications as safe, effective, or approved for treating a specific patient. Up[employer], Inc[employer] and its affiliates disclaim any warranty or liability relating to this information or the use thereof. The use of this information is governed by the Terms of Use, available at  https://www.[employer].com/en/know/clinical-effectiveness-terms   Copyright   Copyright © 2024 UpToDate, Inc. and its affiliates and/or licensors. All rights reserved.    Follow up with PCP in 3-5 days.  Proceed to  ER if symptoms worsen.    Chief Complaint     Chief Complaint   Patient presents with    Sore Throat     Reports sore throat over past 2 days. Fever reported yesterday of 101.0. taking motrin last dose this morning. Negative home test for covid yesterday.          History of Present Illness       Cough  This is a new problem. The current episode started in the past 7 days (x 2 days). The problem has been unchanged. The cough is Productive of sputum. Associated symptoms include a fever, nasal congestion and a sore throat. Pertinent negatives include no chest pain, chills, ear congestion, ear pain, eye redness, headaches, heartburn, hemoptysis, myalgias, postnasal drip, rash, rhinorrhea, shortness of breath, sweats, weight loss or wheezing. Associated symptoms comments: T. Max of 101, mild sore throat, COVID negative at home. Nothing aggravates the symptoms. She has tried OTC cough suppressant for the symptoms. The treatment provided no relief. There is no history of asthma, bronchiectasis, bronchitis, COPD, emphysema, environmental allergies or pneumonia.       Review of Systems   Review of Systems   Constitutional:  Positive for fever. Negative for chills, fatigue and weight loss.   HENT:  Positive for sore throat. Negative for congestion, ear discharge, ear pain, postnasal drip, rhinorrhea, sinus pressure, sinus pain and sneezing.    Eyes: Negative.  Negative for pain, discharge, redness and itching.   Respiratory:  Positive for cough. Negative for apnea, hemoptysis, choking, chest tightness, shortness of breath, wheezing and stridor.    Cardiovascular: Negative.  Negative for chest pain and palpitations.   Gastrointestinal: Negative.  Negative for diarrhea, heartburn, nausea and vomiting.    Endocrine: Negative.  Negative for polydipsia, polyphagia and polyuria.   Genitourinary: Negative.  Negative for decreased urine volume and flank pain.   Musculoskeletal: Negative.  Negative for arthralgias, back pain, myalgias, neck pain and neck stiffness.   Skin: Negative.  Negative for color change and rash.   Allergic/Immunologic: Negative.  Negative for environmental allergies.   Neurological: Negative.  Negative for dizziness, facial asymmetry, light-headedness, numbness and headaches.   Hematological: Negative.  Negative for adenopathy.   Psychiatric/Behavioral: Negative.           Current Medications       Current Outpatient Medications:     acetaminophen (TYLENOL) 325 mg tablet, Take 2 tablets (650 mg total) by mouth every 4 (four) hours as needed for mild pain or headaches, Disp: 30 tablet, Rfl: 0    fluticasone (FLONASE) 50 mcg/act nasal spray, 1 spray into each nostril daily, Disp: 11.1 mL, Rfl: 0    medroxyPROGESTERone (DEPO-PROVERA) 150 mg/mL injection, INJECT 1 ML (150 MG TOTAL) INTO A MUSCLE EVERY 3 (THREE) MONTHS, Disp: 1 mL, Rfl: 0    ondansetron (ZOFRAN-ODT) 4 mg disintegrating tablet, Take 1 tablet (4 mg total) by mouth every 6 (six) hours as needed for nausea or vomiting, Disp: 20 tablet, Rfl: 0    PARoxetine (PAXIL) 20 mg tablet, TAKE 1 TABLET BY MOUTH EVERY DAY, Disp: 90 tablet, Rfl: 1    Current Allergies     Allergies as of 09/25/2024 - Reviewed 09/25/2024   Allergen Reaction Noted    Penicillins Hives 12/13/2016            The following portions of the patient's history were reviewed and updated as appropriate: allergies, current medications, past family history, past medical history, past social history, past surgical history and problem list.     Past Medical History:   Diagnosis Date    Anxiety     Heart murmur     Hyperlipidemia     Migraine     VSD (ventricular septal defect and aortic arch hypoplasia        No past surgical history on file.    Family History   Problem Relation Age  of Onset    No Known Problems Mother     Heart disease Father          Medications have been verified.        Objective   /81   Pulse 87   Temp 97.7 °F (36.5 °C) (Temporal)   Resp 16   SpO2 100%        Physical Exam     Physical Exam  Vitals and nursing note reviewed.   Constitutional:       General: She is not in acute distress.     Appearance: Normal appearance. She is not ill-appearing, toxic-appearing or diaphoretic.      Interventions: She is not intubated.  HENT:      Head: Normocephalic and atraumatic.      Right Ear: Tympanic membrane normal.      Left Ear: Tympanic membrane normal.      Nose: Nose normal. No congestion or rhinorrhea.      Mouth/Throat:      Mouth: Mucous membranes are moist.      Pharynx: Oropharynx is clear. Uvula midline. No pharyngeal swelling, oropharyngeal exudate, posterior oropharyngeal erythema, uvula swelling or postnasal drip.      Tonsils: No tonsillar exudate or tonsillar abscesses. 1+ on the right. 1+ on the left.   Eyes:      Extraocular Movements: Extraocular movements intact.      Conjunctiva/sclera: Conjunctivae normal.      Pupils: Pupils are equal, round, and reactive to light.   Neck:      Thyroid: No thyroid mass, thyromegaly or thyroid tenderness.   Cardiovascular:      Rate and Rhythm: Normal rate and regular rhythm.      Pulses: Normal pulses.      Heart sounds: Normal heart sounds, S1 normal and S2 normal. Heart sounds not distant. No murmur heard.     No friction rub. No gallop.   Pulmonary:      Effort: Pulmonary effort is normal. No tachypnea, bradypnea, accessory muscle usage, prolonged expiration, respiratory distress or retractions. She is not intubated.      Breath sounds: Normal breath sounds. No stridor, decreased air movement or transmitted upper airway sounds. No decreased breath sounds, wheezing, rhonchi or rales.   Abdominal:      General: Bowel sounds are normal.      Palpations: Abdomen is soft.      Tenderness: There is no abdominal  tenderness. There is no guarding or rebound.   Musculoskeletal:         General: Normal range of motion.      Cervical back: Full passive range of motion without pain, normal range of motion and neck supple. No tenderness. No spinous process tenderness or muscular tenderness. Normal range of motion.   Lymphadenopathy:      Cervical: No cervical adenopathy.      Right cervical: No superficial cervical adenopathy.     Left cervical: No superficial cervical adenopathy.   Skin:     General: Skin is warm and dry.      Capillary Refill: Capillary refill takes less than 2 seconds.   Neurological:      General: No focal deficit present.      Mental Status: She is alert and oriented to person, place, and time.      Cranial Nerves: No cranial nerve deficit.   Psychiatric:         Mood and Affect: Mood normal.         Behavior: Behavior normal.

## 2024-10-15 DIAGNOSIS — Z30.42 ENCOUNTER FOR SURVEILLANCE OF INJECTABLE CONTRACEPTIVE: ICD-10-CM

## 2024-10-15 NOTE — TELEPHONE ENCOUNTER
Called and left  message to see if patient would like to schedule with another provider. For her symptoms

## 2024-10-16 RX ORDER — MEDROXYPROGESTERONE ACETATE 150 MG/ML
150 INJECTION, SUSPENSION INTRAMUSCULAR
Qty: 1 ML | Refills: 0 | Status: SHIPPED | OUTPATIENT
Start: 2024-10-16

## 2024-10-18 ENCOUNTER — HOSPITAL ENCOUNTER (OUTPATIENT)
Dept: NON INVASIVE DIAGNOSTICS | Facility: CLINIC | Age: 24
Discharge: HOME | End: 2024-10-18
Payer: COMMERCIAL

## 2024-10-18 VITALS
HEART RATE: 95 BPM | WEIGHT: 100 LBS | HEIGHT: 59 IN | BODY MASS INDEX: 20.16 KG/M2 | SYSTOLIC BLOOD PRESSURE: 115 MMHG | DIASTOLIC BLOOD PRESSURE: 81 MMHG

## 2024-10-18 DIAGNOSIS — R55 NEAR SYNCOPE: ICD-10-CM

## 2024-10-18 LAB
AORTIC ROOT: 2.8 CM
AORTIC VALVE MEAN VELOCITY: 8.7 M/S
APICAL FOUR CHAMBER EJECTION FRACTION: 62 %
ASCENDING AORTA: 2.7 CM
AV AREA BY CONTINUOUS VTI: 2.4 CM2
AV AREA PEAK VELOCITY: 2.6 CM2
AV LVOT MEAN GRADIENT: 3 MMHG
AV LVOT PEAK GRADIENT: 5 MMHG
AV MEAN GRADIENT: 3 MMHG
AV PEAK GRADIENT: 6 MMHG
AV VALVE AREA: 2.42 CM2
AV VELOCITY RATIO: 0.91
BSA FOR ECHO PROCEDURE: 1.37 M2
DOP CALC AO PEAK VEL: 1.26 M/S
DOP CALC AO VTI: 22.71 CM
DOP CALC LVOT AREA: 2.83 CM2
DOP CALC LVOT CARDIAC INDEX: 3.59 L/MIN/M2
DOP CALC LVOT CARDIAC OUTPUT: 4.96 L/MIN
DOP CALC LVOT DIAMETER: 1.9 CM
DOP CALC LVOT PEAK VEL VTI: 19.39 CM
DOP CALC LVOT PEAK VEL: 1.15 M/S
DOP CALC LVOT STROKE INDEX: 38.4 ML/M2
DOP CALC LVOT STROKE VOLUME: 54.95
E WAVE DECELERATION TIME: 171 MS
E/A RATIO: 1.36
FRACTIONAL SHORTENING: 48 (ref 28–44)
INTERVENTRICULAR SEPTUM IN DIASTOLE (PARASTERNAL SHORT AXIS VIEW): 0.8 CM
INTERVENTRICULAR SEPTUM: 0.8 CM (ref 0.6–1.1)
LAAS-AP2: 14.4 CM2
LAAS-AP4: 11.5 CM2
LEFT ATRIUM SIZE: 3.2 CM
LEFT ATRIUM VOLUME (MOD BIPLANE): 30 ML
LEFT ATRIUM VOLUME INDEX (MOD BIPLANE): 21.7 ML/M2
LEFT INTERNAL DIMENSION IN SYSTOLE: 2.2 CM (ref 2.1–4)
LEFT VENTRICULAR INTERNAL DIMENSION IN DIASTOLE: 4.2 CM (ref 3.5–6)
LEFT VENTRICULAR POSTERIOR WALL IN END DIASTOLE: 0.8 CM
LEFT VENTRICULAR STROKE VOLUME: 62 ML
LVSV (TEICH): 62 ML
MV E'TISSUE VEL-LAT: 18 CM/S
MV E'TISSUE VEL-SEP: 14 CM/S
MV PEAK A VEL: 0.58 M/S
MV PEAK E VEL: 79 CM/S
MV STENOSIS PRESSURE HALF TIME: 50 MS
MV VALVE AREA P 1/2 METHOD: 4.4
RA PRESSURE ESTIMATED: 3 MMHG
RIGHT ATRIUM AREA SYSTOLE A4C: 7.5 CM2
RIGHT VENTRICLE ID DIMENSION: 3 CM
RV PSP: 20 MMHG
SINOTUBULAR JUNCTION: 2.4 CM
SL CV LEFT ATRIUM LENGTH A2C: 4.4 CM
SL CV LV EF: 60
SL CV PED ECHO LEFT VENTRICLE DIASTOLIC VOLUME (MOD BIPLANE) 2D: 79 ML
SL CV PED ECHO LEFT VENTRICLE SYSTOLIC VOLUME (MOD BIPLANE) 2D: 17 ML
SL CV SINUS OF VALSALVA 2D: 2.9 CM
STJ: 2.4 CM
TR MAX PG: 17 MMHG
TRICUSPID ANNULAR PLANE SYSTOLIC EXCURSION: 2.2 CM

## 2024-10-18 PROCEDURE — 93225 XTRNL ECG REC<48 HRS REC: CPT

## 2024-10-18 PROCEDURE — 93226 XTRNL ECG REC<48 HR SCAN A/R: CPT

## 2024-10-18 PROCEDURE — 93306 TTE W/DOPPLER COMPLETE: CPT

## 2024-10-18 PROCEDURE — 93306 TTE W/DOPPLER COMPLETE: CPT | Performed by: INTERNAL MEDICINE

## 2024-10-18 RX ORDER — FLUTICASONE PROPIONATE 50 MCG
SPRAY, SUSPENSION (ML) NASAL
Qty: 48 ML | Refills: 1 | OUTPATIENT
Start: 2024-10-18

## 2024-12-01 ENCOUNTER — OFFICE VISIT (OUTPATIENT)
Dept: URGENT CARE | Age: 24
End: 2024-12-01
Payer: COMMERCIAL

## 2024-12-01 VITALS
HEIGHT: 59 IN | RESPIRATION RATE: 20 BRPM | SYSTOLIC BLOOD PRESSURE: 110 MMHG | BODY MASS INDEX: 20.16 KG/M2 | WEIGHT: 100 LBS | OXYGEN SATURATION: 100 % | HEART RATE: 74 BPM | TEMPERATURE: 98 F | DIASTOLIC BLOOD PRESSURE: 60 MMHG

## 2024-12-01 DIAGNOSIS — J02.9 SORE THROAT: ICD-10-CM

## 2024-12-01 DIAGNOSIS — H92.01 RIGHT EAR PAIN: Primary | ICD-10-CM

## 2024-12-01 PROCEDURE — 87880 STREP A ASSAY W/OPTIC: CPT

## 2024-12-01 RX ORDER — PREDNISONE 20 MG/1
20 TABLET ORAL 2 TIMES DAILY WITH MEALS
Qty: 10 TABLET | Refills: 0 | Status: SHIPPED | OUTPATIENT
Start: 2024-12-01 | End: 2024-12-03 | Stop reason: ALTCHOICE

## 2024-12-01 NOTE — PATIENT INSTRUCTIONS
Rapid strep performed in office found to be negative.  Please begin short course of prednisone as directed. May continue Flonase, may also trial OTC allergy medication such as Claritin or Zyrtec.   Follow up with ENT as directed.

## 2024-12-01 NOTE — LETTER
December 1, 2024     Patient: Sasha Palacios   YOB: 2000   Date of Visit: 12/1/2024       To Whom it May Concern:    Sasha Palacios was seen in my clinic on 12/1/2024. She may return on 12/03/2024.     If you have any questions or concerns, please don't hesitate to call.         Sincerely,          ANGELA Hull        CC: No Recipients

## 2024-12-01 NOTE — PROGRESS NOTES
Assessment/Plan  Rapid strep performed in office found to be negative.  Please begin short course of prednisone as directed. May continue Flonase, may also trial OTC allergy medication such as Claritin or Zyrtec.   Follow up with ENT as directed.     Right ear pain [H92.01]  1. Right ear pain  Ambulatory Referral to Otolaryngology    predniSONE 20 mg tablet      2. Sore throat  POCT rapid ANTIGEN strepA      Patient Education     Ear Pain ED   General Information   You came to the Emergency Department (ED) for ear pain. Many different things can cause ear pain.  What care is needed at home?   Call your regular doctor to let them know you were in the ED. Make a follow-up appointment if you were told to.  You may want to take drugs like ibuprofen, naproxen, or acetaminophen to help with pain.  If it helps you feel better, you can hold a cool, wet washcloth on the outside of your ear.  When do I need to get emergency help?   Return to the ED if:   Your face looks uneven or droops on 1 side.  You are very confused or cannot function normally.  You have trouble walking or are unsteady.  You are too weak to stand.  When do I need to call the doctor?   You have a fever of 100.4°F (38°C) or higher, or chills.  Your pain gets worse.  You have redness behind your ear (this can be harder to see on dark skin).  You have swelling behind your ear.  Your outer ear is painful and swollen.  You have blisters inside your ear.  You have fluid, pus, or blood draining from your ear  You have new or worsening symptoms.  Last Reviewed Date   2023-05-02  Consumer Information Use and Disclaimer   This generalized information is a limited summary of diagnosis, treatment, and/or medication information. It is not meant to be comprehensive and should be used as a tool to help the user understand and/or assess potential diagnostic and treatment options. It does NOT include all information about conditions, treatments, medications, side  "effects, or risks that may apply to a specific patient. It is not intended to be medical advice or a substitute for the medical advice, diagnosis, or treatment of a health care provider based on the health care provider's examination and assessment of a patient’s specific and unique circumstances. Patients must speak with a health care provider for complete information about their health, medical questions, and treatment options, including any risks or benefits regarding use of medications. This information does not endorse any treatments or medications as safe, effective, or approved for treating a specific patient. [employer] and its affiliates disclaim any warranty or liability relating to this information or the use thereof. The use of this information is governed by the Terms of Use, available at https://www.doxIQ.com/en/know/clinical-effectiveness-terms   Copyright   Copyright © 2024 [employer], Inc[employer] and its affiliates and/or licensors. All rights reserved.         Subjective:     Patient ID: Sasha Palacios is a 24 y.o. female.      Reason For Visit / Chief Complaint  Chief Complaint   Patient presents with    Earache     Symptoms started yesterday. Right ear pain. Right ear is itching and has a burning sensation. No fever, cnills reported.          Earache   There is pain in the right ear. This is a new problem. The current episode started yesterday. The problem occurs hourly. The problem has been unchanged. There has been no fever. The pain is mild. Associated symptoms include a sore throat. Pertinent negatives include no abdominal pain, coughing, diarrhea, ear discharge, headaches, hearing loss, neck pain, rash, rhinorrhea or vomiting. Associated symptoms comments: Reports an \"itching\" sensation of right ear. Also notes pulsing sound when listening to ear phones. . She has tried nothing for the symptoms. The treatment provided no relief. There is no history of a chronic ear infection, hearing " "loss or a tympanostomy tube.           Past Medical History:   Diagnosis Date    Anxiety     Heart murmur     Hyperlipidemia     Migraine     VSD (ventricular septal defect and aortic arch hypoplasia        No past surgical history on file.    Family History   Problem Relation Age of Onset    No Known Problems Mother     Heart disease Father        Review of Systems   Constitutional:  Negative for fatigue and fever.   HENT:  Positive for ear pain, sore throat and tinnitus. Negative for congestion, ear discharge, hearing loss, postnasal drip, rhinorrhea, sinus pressure, sinus pain, sneezing, trouble swallowing and voice change.    Eyes: Negative.  Negative for pain, discharge, redness and itching.   Respiratory: Negative.  Negative for apnea, cough, choking, chest tightness, shortness of breath, wheezing and stridor.    Cardiovascular: Negative.  Negative for chest pain and palpitations.   Gastrointestinal: Negative.  Negative for abdominal pain, diarrhea, nausea and vomiting.   Endocrine: Negative.  Negative for polydipsia, polyphagia and polyuria.   Genitourinary: Negative.  Negative for decreased urine volume and flank pain.   Musculoskeletal: Negative.  Negative for arthralgias, back pain, myalgias, neck pain and neck stiffness.   Skin: Negative.  Negative for color change and rash.   Allergic/Immunologic: Negative.  Negative for environmental allergies.   Neurological: Negative.  Negative for dizziness, facial asymmetry, light-headedness, numbness and headaches.   Hematological: Negative.  Negative for adenopathy.   Psychiatric/Behavioral: Negative.         Objective:    /60   Pulse 74   Temp 98 °F (36.7 °C)   Resp 20   Ht 4' 11\" (1.499 m)   Wt 45.4 kg (100 lb)   LMP 11/19/2024   SpO2 100%   BMI 20.20 kg/m²     Physical Exam  Vitals and nursing note reviewed.   Constitutional:       General: She is not in acute distress.     Appearance: Normal appearance. She is not ill-appearing, toxic-appearing " or diaphoretic.      Interventions: She is not intubated.  HENT:      Head: Normocephalic and atraumatic.      Right Ear: Hearing, tympanic membrane, ear canal and external ear normal. Tympanic membrane is not erythematous or bulging.      Left Ear: Hearing, tympanic membrane, ear canal and external ear normal. Tympanic membrane is not erythematous or bulging.      Nose: Nose normal. No congestion or rhinorrhea.      Mouth/Throat:      Mouth: Mucous membranes are moist.      Pharynx: Oropharynx is clear. Uvula midline. No pharyngeal swelling, oropharyngeal exudate, posterior oropharyngeal erythema, uvula swelling or postnasal drip.      Tonsils: No tonsillar exudate or tonsillar abscesses. 0 on the right. 0 on the left.   Eyes:      Extraocular Movements: Extraocular movements intact.      Conjunctiva/sclera: Conjunctivae normal.      Pupils: Pupils are equal, round, and reactive to light.   Neck:      Thyroid: No thyroid mass, thyromegaly or thyroid tenderness.   Cardiovascular:      Rate and Rhythm: Normal rate and regular rhythm.      Pulses: Normal pulses.      Heart sounds: Normal heart sounds, S1 normal and S2 normal. Heart sounds not distant. No murmur heard.     No friction rub. No gallop.   Pulmonary:      Effort: Pulmonary effort is normal. No tachypnea, bradypnea, accessory muscle usage, prolonged expiration, respiratory distress or retractions. She is not intubated.      Breath sounds: Normal breath sounds. No stridor, decreased air movement or transmitted upper airway sounds. No decreased breath sounds, wheezing, rhonchi or rales.   Abdominal:      General: Bowel sounds are normal.      Palpations: Abdomen is soft.      Tenderness: There is no abdominal tenderness. There is no guarding or rebound.   Musculoskeletal:         General: Normal range of motion.      Cervical back: Full passive range of motion without pain, normal range of motion and neck supple. No tenderness. No spinous process tenderness  or muscular tenderness. Normal range of motion.   Lymphadenopathy:      Cervical: Cervical adenopathy present.      Right cervical: Superficial cervical adenopathy present.      Left cervical: No superficial cervical adenopathy.   Skin:     General: Skin is warm and dry.      Capillary Refill: Capillary refill takes less than 2 seconds.   Neurological:      General: No focal deficit present.      Mental Status: She is alert and oriented to person, place, and time.      Cranial Nerves: No cranial nerve deficit.   Psychiatric:         Mood and Affect: Mood normal.         Behavior: Behavior normal.

## 2024-12-03 ENCOUNTER — TELEPHONE (OUTPATIENT)
Age: 24
End: 2024-12-03

## 2024-12-03 ENCOUNTER — OFFICE VISIT (OUTPATIENT)
Dept: FAMILY MEDICINE CLINIC | Facility: CLINIC | Age: 24
End: 2024-12-03
Payer: COMMERCIAL

## 2024-12-03 VITALS
SYSTOLIC BLOOD PRESSURE: 100 MMHG | DIASTOLIC BLOOD PRESSURE: 70 MMHG | HEIGHT: 59 IN | HEART RATE: 100 BPM | WEIGHT: 108 LBS | BODY MASS INDEX: 21.77 KG/M2 | TEMPERATURE: 100.1 F | OXYGEN SATURATION: 95 % | RESPIRATION RATE: 16 BRPM

## 2024-12-03 DIAGNOSIS — J06.9 VIRAL UPPER RESPIRATORY TRACT INFECTION: ICD-10-CM

## 2024-12-03 LAB
S PYO AG THROAT QL: NEGATIVE
SARS-COV-2 AG UPPER RESP QL IA: NEGATIVE
SL AMB POCT RAPID FLU A: NEGATIVE
SL AMB POCT RAPID FLU B: NEGATIVE
VALID CONTROL: NORMAL

## 2024-12-03 PROCEDURE — 87811 SARS-COV-2 COVID19 W/OPTIC: CPT | Performed by: NURSE PRACTITIONER

## 2024-12-03 PROCEDURE — 99213 OFFICE O/P EST LOW 20 MIN: CPT | Performed by: NURSE PRACTITIONER

## 2024-12-03 PROCEDURE — 87804 INFLUENZA ASSAY W/OPTIC: CPT | Performed by: NURSE PRACTITIONER

## 2024-12-03 RX ORDER — DOXYCYCLINE HYCLATE 100 MG
100 TABLET ORAL 2 TIMES DAILY
Qty: 14 TABLET | Refills: 0 | Status: SHIPPED | OUTPATIENT
Start: 2024-12-03 | End: 2024-12-10

## 2024-12-03 NOTE — PROGRESS NOTES
Name: Sasha Palacios      : 2000      MRN: [id number]  Encounter Provider: ANGELA Max  Encounter Date: 12/3/2024   Encounter department: [facility]  :  Assessment & Plan  Viral upper respiratory tract infection  Negative COVID-19 and flu rapid swabs.   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   Symptoms are likely viral, though there is high prevalence of mycoplasma pneumonia in the community.   Given this, with her history of congential heart defect, will start doxycycline. She will call me if she is not improving over the next 3-4 days.   Work note provided.     Orders:    POCT rapid flu A and B    POCT Rapid Covid Ag    doxycycline hyclate (VIBRA-TABS) 100 mg tablet; Take 1 tablet (100 mg total) by mouth 2 (two) times a day for 7 days          Depression Screening and Follow-up Plan: Patient was screened for depression during today's encounter. They screened negative with a PHQ-2 score of 0.      History of Present Illness     Sasha Palacios is a 24 year old female presenting today for cold symptoms.     Symptoms started 2 days ago.     Symptoms include:  Cough  Sneezing  Sore throat  Fevers max 100.   No headaches. No body aches.   +chills and sweats.   No nausea, vomiting, or diarrhea.   Rhinorrhea. No nasal congestion.   No wheezing.   Had mid chest pain, but this has since resolved.     OTC medications: Tylenol, flonase.     Sick contacts: none known.     Working at [employer].     Home COVID-19 test was not completed.     Right ear tinnitus and intermittent pain, ongoing, has previously been referred to ENT.             Review of Systems   Constitutional:  Positive for chills, diaphoresis, fatigue and fever.   HENT:  Positive for ear pain, rhinorrhea, sneezing and sore throat. Negative for congestion.    Respiratory:  Positive for cough. Negative for chest tightness, shortness of breath and wheezing.    Cardiovascular:  "Negative.    Gastrointestinal: Negative.    Musculoskeletal:  Negative for myalgias.   Neurological:  Negative for headaches.     Current Outpatient Medications on File Prior to Visit   Medication Sig Dispense Refill    acetaminophen (TYLENOL) 325 mg tablet Take 2 tablets (650 mg total) by mouth every 4 (four) hours as needed for mild pain or headaches 30 tablet 0    fluticasone (FLONASE) 50 mcg/act nasal spray 1 spray into each nostril daily 11.1 mL 0    medroxyPROGESTERone (DEPO-PROVERA) 150 mg/mL injection INJECT 1 ML (150 MG TOTAL) INTO A MUSCLE EVERY 3 (THREE) MONTHS 1 mL 0    [DISCONTINUED] ondansetron (ZOFRAN-ODT) 4 mg disintegrating tablet Take 1 tablet (4 mg total) by mouth every 6 (six) hours as needed for nausea or vomiting (Patient not taking: Reported on 12/1/2024) 20 tablet 0    [DISCONTINUED] PARoxetine (PAXIL) 20 mg tablet TAKE 1 TABLET BY MOUTH EVERY DAY (Patient not taking: Reported on 12/1/2024) 90 tablet 1    [DISCONTINUED] predniSONE 20 mg tablet Take 1 tablet (20 mg total) by mouth 2 (two) times a day with meals for 5 days (Patient not taking: Reported on 12/3/2024) 10 tablet 0     No current facility-administered medications on file prior to visit.         Objective   /70   Pulse 100   Temp 100.1 °F (37.8 °C) (Temporal)   Resp 16   Ht 4' 11\" (1.499 m)   Wt 49 kg (108 lb)   LMP 11/19/2024   SpO2 95%   BMI 21.81 kg/m²      Physical Exam  Vitals and nursing note reviewed.   Constitutional:       General: She is not in acute distress.     Appearance: Normal appearance. She is not ill-appearing.   HENT:      Head: Atraumatic.      Right Ear: Tympanic membrane normal.      Left Ear: Tympanic membrane normal.      Mouth/Throat:      Mouth: Mucous membranes are moist.      Pharynx: Oropharynx is clear. No oropharyngeal exudate or posterior oropharyngeal erythema.   Cardiovascular:      Rate and Rhythm: Normal rate and regular rhythm.      Heart sounds: Murmur heard.   Pulmonary:      " Effort: Pulmonary effort is normal.      Breath sounds: Normal breath sounds.   Musculoskeletal:      Cervical back: Normal range of motion and neck supple.      Right lower leg: No edema.      Left lower leg: No edema.   Lymphadenopathy:      Cervical: No cervical adenopathy.   Neurological:      Mental Status: She is alert.   Psychiatric:         Mood and Affect: Mood normal.

## 2024-12-03 NOTE — LETTER
December 3, 2024     Patient: Sasha Palacios  YOB: 2000  Date of Visit: 12/3/2024      To Whom it May Concern:    Sasha Palacios is under my professional care. Sasha was seen in my office on 12/3/2024. Sasha may return to work on 12/5/2024 . Please excuse from work today, 12/3/24, and tomorrow 12/4/24.     If you have any questions or concerns, please don't hesitate to call.         Sincerely,          ANGELA Max        CC: No Recipients

## 2025-01-27 ENCOUNTER — TELEPHONE (OUTPATIENT)
Age: 25
End: 2025-01-27

## 2025-01-27 NOTE — TELEPHONE ENCOUNTER
Pt calling to see if office provides IUDs for birth control; confirmed w/ Dina in office that the providers only prescribe the pill. Pt does not have an OB/GYN and she was seeing Dr. Hickey when she was pregnant and was looking for suggestions around the [place] area.    Please advise and give pt a call back for further details, TY.  Callback # [phone]

## 2025-01-28 NOTE — TELEPHONE ENCOUNTER
Looks like she has an appointment with GYN in [place] set up for next week. Just to clarify - we can do more than birth control pills, there is also the shot, patches, and vaginal rings. The only contraception we cannot provide in this office is the permanent implants which is Nexplanon or and IUD.

## 2025-01-28 NOTE — TELEPHONE ENCOUNTER
Called patient and let message for a staff member give the patient the message and provide clarity

## 2025-02-03 NOTE — PROGRESS NOTES
Assessment & Plan   Diagnoses and all orders for this visit:    Women's annual routine gynecological examination  -     POCT urine HCG    Vaginal discharge  -     Molecular Vaginal Panel    Screening for STD (sexually transmitted disease)  -     Chlamydia/GC amplified DNA by PCR    Cervical cancer screening  -     Liquid-based pap, screening    Birth control counseling        ASSESSMENT & PLAN: Sasha is a 24 y.o.  with normal   gynecologic exam.    1.  Routine well woman exam done today.  2.  Cervical Cancer Screening: Pap was done today.  Current ASCCP Guidelines reviewed.   3.  Sasha accepted STD testing.  chlamydia, gonorrhea, and trichomonas testing performed. Safe sex practices have been discussed.  4.  Gardasil is recommended for patients from 9-45 years of age. Sasha has had the Gardasil vaccine series.   5. She accepted contraception Paragard IUD. We discussed history of migraines with aura is contraindicated with estrogen based birth control. Discussed risk vs benefits of progestin only method including POP, depo-provera, nexplanon and IUD. She is most interested in IUD and discussed hormonal vs non-hormonal options. We discussed side effects of each. Counseled patient on IUD procedure, including insertion risks of  infection, perforation and expulsion. Reviewed with pt increased ectopic risk, migration, PID, infertility, high risk pregnancy if a pregnancy were to occur. I reviewed possible irregular menses and specific side effects of Mirena, such as bloating/headaches. We discussed heavier menses and dysmenorrhea are more common with the copper IUD  6. RTO for procedure.  Advised to take 600 mg ibuprofen one hour prior to procedure.   7. I have discussed the importance of monthly self-breast exams, exercise a minimum of 150 minutes each week including weight bearing exercises while maintaining a healthy diet including adequate intake of Calcium and Vitamin D.   8. Molecular vaginal panel  collected and will treat once results are reviewed.   9.  Return to office in 12 months for annual exam.   10. Call your insurance company to verify coverage prior to completing any ordered tests.     Subjective     HPI   Sasha Palacios is a 24 y.o. female who presents for annual well woman exam.     GYN:  She stopped depo-provera in August and resumed menses on 25.   Denies labial erythema or lesions, dyspareunia.   Endorses vaginal discharge and vaginal itching for one week. Used monistat one day yesterday.   (+) vaginal itching, irritation and dryness.  Contraception: requesting birth control counseling today.  denies gynecologic surgeries.  Is sexually active with male partner. Had a one night affair with male partner 2-3 weeks ago. No condom use. She accepts STD testing.   UPT negative   Previously on depo -provera, stopped August. Was on depo-provera starting after  2021.       OB:  OB History    Para Term  AB Living   1 1 1   1   SAB IAB Ectopic Multiple Live Births      0 1      # Outcome Date GA Lbr Reid/2nd Weight Sex Type Anes PTL Lv   1 Term 23 38w4d / 00:34 2820 g (6 lb 3.5 oz) M Vag-Spont EPI N ARAMIS         :  denies dysuria, urinary frequency or urgency.  denies iincontinence.    Breast:  denies breast mass, skin changes, dimpling, reddening, nipple retraction.  denies breast discharge.  denies breast tenderness bilaterally    Patient does  have a family history of breast, endometrial, colon, or ovarian ca. Maternal grandmother history of ovarian cancer passed around 70.      Health Maintenance:    Active lifestyle  She feels safe at home and denies domestic violence    She does follow a well balanced diet.    She does vape nicotine daily   She does follow with a PCP.    Social History:  Social History     Socioeconomic History    Marital status: Single     Spouse name: Not on file    Number of children: Not on file    Years of education: Not on file    Highest education  level: Not on file   Occupational History    Not on file   Tobacco Use    Smoking status: Never    Smokeless tobacco: Never    Tobacco comments:     vaping   Vaping Use    Vaping status: Every Day    Substances: Nicotine, Flavoring   Substance and Sexual Activity    Alcohol use: No    Drug use: No    Sexual activity: Yes     Partners: Male   Other Topics Concern    Not on file   Social History Narrative    Not on file     Social Drivers of Health     Financial Resource Strain: Medium Risk (3/8/2022)    Overall Financial Resource Strain (CARDIA)     Difficulty of Paying Living Expenses: Somewhat hard   Food Insecurity: No Food Insecurity (3/8/2022)    Hunger Vital Sign     Worried About Running Out of Food in the Last Year: Never true     Ran Out of Food in the Last Year: Never true   Transportation Needs: No Transportation Needs (3/8/2022)    PRAPARE - Transportation     Lack of Transportation (Medical): No     Lack of Transportation (Non-Medical): No   Physical Activity: Not on file   Stress: Stress Concern Present (3/8/2022)    Citizen of [place] [employer] - Occupational Stress Questionnaire     Feeling of Stress : To some extent   Social Connections: Not on file   Intimate Partner Violence: Not on file   Housing Stability: Unknown (3/8/2022)    Housing Stability Vital Sign     Unable to Pay for Housing in the Last Year: No     Number of Places Lived in the Last Year: Not on file     Unstable Housing in the Last Year: No       Social History     Tobacco Use    Smoking status: Never    Smokeless tobacco: Never    Tobacco comments:     vaping   Vaping Use    Vaping status: Every Day    Substances: Nicotine, Flavoring   Substance Use Topics    Alcohol use: No    Drug use: No       Screening:  Cervical cancer:pap smear initiated today. Patient has  received Gardasil vaccine.   Breast cancer: Will begin at age 40 per ACOG guidelines.   Colon cancer: Will begin at age 45.   STD screening: GC/CT/  Trich.    Review of Systems   Constitutional: Negative.  Negative for activity change, appetite change and fever.   Respiratory:  Negative for shortness of breath.    Cardiovascular:  Negative for chest pain.   Gastrointestinal:  Negative for abdominal pain, constipation, diarrhea and vomiting.   Genitourinary:  Negative for dyspareunia, dysuria, frequency, menstrual problem, pelvic pain, vaginal bleeding, vaginal discharge and vaginal pain.   Skin:  Negative for color change.   Psychiatric/Behavioral: Negative.     All other systems reviewed and are negative.      The following portions of the patient's history were reviewed and updated as appropriate: allergies, current medications, past family history, past medical history, past social history, past surgical history, and problem list.         OB History          1    Para   1    Term   1            AB        Living   1         SAB        IAB        Ectopic        Multiple   0    Live Births   1                 Past Medical History:   Diagnosis Date    Anxiety     Dizziness     Ear problems     Fatigue     Heart murmur     Hyperlipidemia     Migraine     VSD (ventricular septal defect and aortic arch hypoplasia        History reviewed. No pertinent surgical history.    Family History   Problem Relation Age of Onset    Stroke Mother     Heart disease Father     Stroke Maternal Grandmother          Current Outpatient Medications:     acetaminophen (TYLENOL) 325 mg tablet, Take 2 tablets (650 mg total) by mouth every 4 (four) hours as needed for mild pain or headaches, Disp: 30 tablet, Rfl: 0    fluticasone (FLONASE) 50 mcg/act nasal spray, 1 spray into each nostril daily, Disp: 11.1 mL, Rfl: 0    Allergies   Allergen Reactions    Penicillins Hives       Objective   Vitals:    25 1454   Weight: 47.8 kg (105 lb 6.4 oz)     Physical Exam  Vitals and nursing note reviewed.   Constitutional:       General: She is not in acute distress.     Appearance:  Normal appearance. She is not ill-appearing.   HENT:      Head: Normocephalic.   Neck:      Thyroid: No thyromegaly or thyroid tenderness.   Cardiovascular:      Rate and Rhythm: Normal rate and regular rhythm.      Heart sounds: Normal heart sounds.   Pulmonary:      Effort: Pulmonary effort is normal. No respiratory distress.      Breath sounds: Normal breath sounds.   Chest:   Breasts:     Right: Normal. No inverted nipple, nipple discharge, skin change or tenderness.      Left: Normal. No inverted nipple, nipple discharge, skin change or tenderness.   Abdominal:      General: Abdomen is flat.      Palpations: Abdomen is soft.      Tenderness: There is no abdominal tenderness. There is no guarding.   Genitourinary:     General: Normal vulva.      Exam position: Lithotomy position.      Labia:         Right: No rash, tenderness or lesion.         Left: No rash, tenderness or lesion.       Urethra: No prolapse.      Vagina: Normal. No vaginal discharge, erythema, tenderness or lesions.      Cervix: Normal. No cervical motion tenderness, discharge or lesion.      Uterus: Not tender and no uterine prolapse.       Adnexa:         Right: No tenderness.          Left: No tenderness.     Musculoskeletal:      Cervical back: Neck supple.   Lymphadenopathy:      Cervical: No cervical adenopathy.   Skin:     General: Skin is warm and dry.      Capillary Refill: Capillary refill takes less than 2 seconds.   Neurological:      General: No focal deficit present.      Mental Status: She is alert and oriented to person, place, and time.   Psychiatric:         Mood and Affect: Mood normal.         Behavior: Behavior normal.         Thought Content: Thought content normal.         Liza MILLER  OB/GYN  2/4/2025  7:34 PM

## 2025-02-04 ENCOUNTER — OFFICE VISIT (OUTPATIENT)
Dept: OBGYN CLINIC | Facility: CLINIC | Age: 25
End: 2025-02-04
Payer: COMMERCIAL

## 2025-02-04 VITALS — BODY MASS INDEX: 21.29 KG/M2 | WEIGHT: 105.4 LBS

## 2025-02-04 DIAGNOSIS — Z01.419 WOMEN'S ANNUAL ROUTINE GYNECOLOGICAL EXAMINATION: Primary | ICD-10-CM

## 2025-02-04 DIAGNOSIS — Z30.09 BIRTH CONTROL COUNSELING: ICD-10-CM

## 2025-02-04 DIAGNOSIS — Z11.3 SCREENING FOR STD (SEXUALLY TRANSMITTED DISEASE): ICD-10-CM

## 2025-02-04 DIAGNOSIS — Z12.4 CERVICAL CANCER SCREENING: ICD-10-CM

## 2025-02-04 DIAGNOSIS — N89.8 VAGINAL DISCHARGE: ICD-10-CM

## 2025-02-04 LAB — SL AMB POCT URINE HCG: NEGATIVE

## 2025-02-04 PROCEDURE — 87591 N.GONORRHOEAE DNA AMP PROB: CPT

## 2025-02-04 PROCEDURE — S0610 ANNUAL GYNECOLOGICAL EXAMINA: HCPCS

## 2025-02-04 PROCEDURE — 81025 URINE PREGNANCY TEST: CPT

## 2025-02-04 PROCEDURE — 87624 HPV HI-RISK TYP POOLED RSLT: CPT

## 2025-02-04 PROCEDURE — 81514 NFCT DS BV&VAGINITIS DNA ALG: CPT

## 2025-02-04 PROCEDURE — 87491 CHLMYD TRACH DNA AMP PROBE: CPT

## 2025-02-04 PROCEDURE — G0145 SCR C/V CYTO,THINLAYER,RESCR: HCPCS | Performed by: PATHOLOGY

## 2025-02-05 ENCOUNTER — RESULTS FOLLOW-UP (OUTPATIENT)
Dept: OBGYN CLINIC | Facility: CLINIC | Age: 25
End: 2025-02-05

## 2025-02-05 DIAGNOSIS — B96.89 BACTERIAL VAGINOSIS: ICD-10-CM

## 2025-02-05 DIAGNOSIS — N76.0 BACTERIAL VAGINOSIS: ICD-10-CM

## 2025-02-05 DIAGNOSIS — B37.9 YEAST INFECTION: Primary | ICD-10-CM

## 2025-02-05 LAB
C GLABRATA DNA VAG QL NAA+PROBE: NEGATIVE
C KRUSEI DNA VAG QL NAA+PROBE: NEGATIVE
CANDIDA SP 6 PNL VAG NAA+PROBE: POSITIVE
T VAGINALIS DNA VAG QL NAA+PROBE: NEGATIVE
VAGINOSIS/ITIS DNA PNL VAG PROBE+SIG AMP: POSITIVE

## 2025-02-05 RX ORDER — METRONIDAZOLE 500 MG/1
500 TABLET ORAL EVERY 12 HOURS SCHEDULED
Qty: 14 TABLET | Refills: 0 | Status: SHIPPED | OUTPATIENT
Start: 2025-02-05 | End: 2025-02-12

## 2025-02-05 RX ORDER — FLUCONAZOLE 150 MG/1
150 TABLET ORAL ONCE
Qty: 1 TABLET | Refills: 0 | Status: SHIPPED | OUTPATIENT
Start: 2025-02-05 | End: 2025-02-05

## 2025-02-06 LAB
C TRACH DNA SPEC QL NAA+PROBE: NEGATIVE
N GONORRHOEA DNA SPEC QL NAA+PROBE: NEGATIVE

## 2025-02-11 LAB
LAB AP GYN PRIMARY INTERPRETATION: ABNORMAL
Lab: ABNORMAL
PATH INTERP SPEC-IMP: ABNORMAL

## 2025-02-11 PROCEDURE — G0124 SCREEN C/V THIN LAYER BY MD: HCPCS | Performed by: PATHOLOGY

## 2025-02-12 LAB
HPV HR 12 DNA CVX QL NAA+PROBE: POSITIVE
HPV16 DNA CVX QL NAA+PROBE: NEGATIVE
HPV18 DNA CVX QL NAA+PROBE: NEGATIVE

## 2025-02-13 NOTE — TELEPHONE ENCOUNTER
Patient calling to follow up with results. Notes HPV reyes resulted abnormal.     Message to Liza MILLER.  
Circumstantial

## 2025-02-17 ENCOUNTER — TELEMEDICINE (OUTPATIENT)
Dept: OTHER | Facility: HOSPITAL | Age: 25
End: 2025-02-17
Payer: COMMERCIAL

## 2025-02-17 VITALS — TEMPERATURE: 102 F

## 2025-02-17 DIAGNOSIS — R68.89 FLU-LIKE SYMPTOMS: Primary | ICD-10-CM

## 2025-02-17 PROCEDURE — 99212 OFFICE O/P EST SF 10 MIN: CPT | Performed by: NURSE PRACTITIONER

## 2025-02-17 NOTE — PROGRESS NOTES
Virtual Regular Visit  Name: Sasha Palacios      : 2000      MRN: [id number]  Encounter Provider: ANGELA Nelson  Encounter Date: 2025   Encounter department: VIRTUAL CARE       Verification of patient location:  Patient is located at Home in the following state in which I hold an active license [place] :  Assessment & Plan        Encounter provider ANGELA Nelson    The patient was identified by name and date of birth. Sasha Palacios was informed that this is a telemedicine visit and that the visit is being conducted through the Epic Embedded platform. She agrees to proceed..  My office door was closed. No one else was in the room.  She acknowledged consent and understanding of privacy and security of the video platform. The patient has agreed to participate and understands they can discontinue the visit at any time.    Patient is aware this is a billable service.     History obtained from: patient  History of Present Illness     This is a 24 year old female here today for video visit.  She states she is having diarrhea and fever.  She states her mom had the flu.   She is having congestion.  She states she is not coughing.  No chest pain or sob.  She states she is eating and drinking okay.  She states she has used ibuprofen OTC.  She states she is not vaccinated for the flu.  She did not do a home flu or covid test.  Mom recently had the flu.      Review of Systems   Constitutional:  Positive for activity change, chills, fatigue and fever.   HENT:  Positive for congestion and rhinorrhea.    Respiratory:  Negative for cough.    Cardiovascular: Negative.    Gastrointestinal:  Positive for diarrhea.   Skin: Negative.    Neurological: Negative.    Psychiatric/Behavioral: Negative.         Objective   Temp (!) 102 °F (38.9 °C)     Physical Exam  Constitutional:       General: She is not in acute distress.     Appearance: Normal appearance. She is not ill-appearing or toxic-appearing.   HENT:       Head: Normocephalic.      Nose: Congestion present.   Pulmonary:      Effort: Pulmonary effort is normal. No respiratory distress.   Neurological:      Mental Status: She is alert and oriented to person, place, and time.   Psychiatric:         Mood and Affect: Mood normal.         Behavior: Behavior normal.         Thought Content: Thought content normal.         Judgment: Judgment normal.         Visit Time  Total Visit Duration: 5 minutes not including the time spent for establishing the audio/video connection.

## 2025-02-17 NOTE — LETTER
February 17, 2025     Patient: Sasha Palacios  YOB: 2000  Date of Visit: 2/17/2025      To Whom it May Concern:    Sasha Palacios is under my professional care. Sasha was seen in my office on 2/17/2025. Sasha may return to work on when fever free for 24 hours and symptoms are improving .    If you have any questions or concerns, please don't hesitate to call.         Sincerely,          ANGELA Nelson        CC: No Recipients

## 2025-02-17 NOTE — PATIENT INSTRUCTIONS
Rest and drink extra fluids.  Suspect tamiflu will make gi symptoms worse.  Tylenol or Motrin as needed for pain or fever.  OTC cough and cold medications as needed.  Tea with honey can help soothe sore throat.  Cool mist humidification can be helpful.  Follow up with PCP if no improvement.  Go to ER with worsening symptoms, persistent fevers, SOB or difficulty breathing.     Influenza   WHAT YOU NEED TO KNOW:   Influenza (the flu) is an infection caused by the influenza virus. The flu is easily spread when an infected person coughs, sneezes, or has close contact with others. You may be able to spread the flu to others for 1 week or longer after signs or symptoms appear.   DISCHARGE INSTRUCTIONS:   Call 911 for any of the following:   You have trouble breathing, and your lips look purple or blue.    You have a seizure.  Return to the emergency department if:   You are dizzy, or you are urinating less or not at all.     You have a headache with a stiff neck, and you feel tired or confused.    You have new pain or pressure in your chest.    Your symptoms, such as shortness of breath, vomiting, or diarrhea, get worse.     Your symptoms, such as fever and coughing, seem to get better, but then get worse.  Contact your healthcare provider if:   You have new muscle pain or weakness.    You have questions or concerns about your condition or care.  Medicines:  You may need any of the following:  Acetaminophen  decreases pain and fever. It is available without a doctor's order. Ask how much to take and how often to take it. Follow directions. Acetaminophen can cause liver damage if not taken correctly.    NSAIDs , such as ibuprofen, help decrease swelling, pain, and fever. This medicine is available with or without a doctor's order. NSAIDs can cause stomach bleeding or kidney problems in certain people. If you take blood thinner medicine, always ask your healthcare provider if NSAIDs are safe for you. Always read the  medicine label and follow directions.    Antivirals  help fight a viral infection.    Take your medicine as directed.  Contact your healthcare provider if you think your medicine is not helping or if you have side effects. Tell him or her if you are allergic to any medicine. Keep a list of the medicines, vitamins, and herbs you take. Include the amounts, and when and why you take them. Bring the list or the pill bottles to follow-up visits. Carry your medicine list with you in case of an emergency.  Rest  as much as you can to help you recover.  Drink liquids as directed  to help prevent dehydration. Ask how much liquid to drink each day and which liquids are best for you.  Prevent the spread of influenza:   Wash your hands often.  Use soap and water. Wash your hands after you use the bathroom, change a child's diapers, or sneeze. Wash your hands before you prepare or eat food. Use gel hand cleanser when soap and water are not available. Do not touch your eyes, nose, or mouth unless you have washed your hands first.           Cover your mouth when you sneeze or cough.  Cough into a tissue or the bend of your arm.    Clean shared items with a germ-killing .  Clean table surfaces, doorknobs, and light switches. Do not share towels, silverware, and dishes with people who are sick. Wash bed sheets, towels, silverware, and dishes with soap and water.     Wear a mask  over your mouth and nose if you are sick or are near anyone who is sick.     Stay away from others  if you are sick.     Influenza vaccine  helps prevent influenza (flu). Everyone older than 6 months should get a yearly influenza vaccine. Get the vaccine as soon as it is available, usually in September or October each year.  Follow up with your healthcare provider as directed:  Write down your questions so you remember to ask them during your visits.   © 2017 [employer] Information is for End User's use only and may not be sold,  redistributed or otherwise used for commercial purposes. All illustrations and images included in CareNotes® are the copyrighted property of [employer] or [facility] [facility].  The above information is an  only. It is not intended as medical advice for individual conditions or treatments. Talk to your doctor, nurse or pharmacist before following any medical regimen to see if it is safe and effective for you.

## 2025-04-10 ENCOUNTER — PROCEDURE VISIT (OUTPATIENT)
Dept: OBGYN CLINIC | Facility: CLINIC | Age: 25
End: 2025-04-10
Payer: COMMERCIAL

## 2025-04-10 ENCOUNTER — TELEPHONE (OUTPATIENT)
Age: 25
End: 2025-04-10

## 2025-04-10 VITALS
DIASTOLIC BLOOD PRESSURE: 68 MMHG | BODY MASS INDEX: 20.16 KG/M2 | SYSTOLIC BLOOD PRESSURE: 98 MMHG | HEIGHT: 59 IN | WEIGHT: 100 LBS

## 2025-04-10 DIAGNOSIS — Z30.430 ENCOUNTER FOR IUD INSERTION: Primary | ICD-10-CM

## 2025-04-10 LAB — SL AMB POCT URINE HCG: NEGATIVE

## 2025-04-10 PROCEDURE — 81025 URINE PREGNANCY TEST: CPT

## 2025-04-10 PROCEDURE — 58300 INSERT INTRAUTERINE DEVICE: CPT

## 2025-04-10 RX ORDER — COPPER 313.4 MG/1
1 INTRAUTERINE DEVICE INTRAUTERINE
COMMUNITY

## 2025-04-10 RX ORDER — COPPER 313.4 MG/1
1 INTRAUTERINE DEVICE INTRAUTERINE
Status: CANCELLED | OUTPATIENT
Start: 2025-04-10

## 2025-04-10 RX ORDER — COPPER 313.4 MG/1
1 INTRAUTERINE DEVICE INTRAUTERINE
Status: COMPLETED | OUTPATIENT
Start: 2025-04-10 | End: 2025-04-10

## 2025-04-10 RX ADMIN — COPPER 1 EACH: 313.4 INTRAUTERINE DEVICE INTRAUTERINE at 08:39

## 2025-04-10 NOTE — PROGRESS NOTES
IUD Procedure    Date/Time: 4/10/2025 8:39 AM    Performed by: ANGELA Wilhelm  Authorized by: Martine Liu MD    Other Assisting Provider: No    Verbal consent obtained?: Yes    Written consent obtained?: Yes    Risks and benefits: Risks, benefits and alternatives were discussed    Consent given by:  Patient  Time Out:     Time out: Immediately prior to the procedure a time out was called      Time out performed at:  4/10/2025 8:39 AM  Patient states understanding of procedure being performed: Yes    Patient's understanding of procedure matches consent: Yes    Procedure consent matches procedure scheduled: Yes    Relevant documents present and verified: Yes    Test results available and properly labeled: Yes    Required items: Required blood products, implants, devices and special equipment available    Patient identity confirmed:  Verbally with patient  Select procedure: IUD insertion    IUD Insertion:     Pelvic exam performed: yes      Negative GC/chlamydia test: no      Negative urine pregnancy test: yes      Negative serum pregnancy test: no      Cervix cleaned and prepped: yes      Speculum placed in vagina: yes      Tenaculum applied to cervix: yes      Allis applied to cervix: no      IUD inserted with no complications: yes      Strings trimmed: yes      Uterus sounded: yes      Uterus sound depth (cm):  7.5    IUD type:  1 each intrauterine copper  Post-procedure:     Patient tolerated procedure well: yes    Insertion Comments:      Pt for IUD insertion today. Pt has already been previously counseled on all risks/benefits of IUD usage/insertion.  I again reviewed with pt risks of insertion including infection, perforation and expulsion. Reviewed with pt increased ectopic risk, migration, PID, infertility, high risk pregnancy if a pregnancy were to occur. I reviewed possible irregular menses and side effects of Paragard. We reviewed changes in menstrual cycles that may occur.   Pt aware 10  year coverage for birth control but can have it removed at any point if she desires.   Pt tolerated procedure well. Aware nothing PV x 48 hours. Will call with any cramping that does not resolve, fevers/chills. Etc.

## 2025-04-10 NOTE — TELEPHONE ENCOUNTER
Patient wanted excuse changed to 4/11/25.  Spoke with Suzanne will be updated and placed in my chart.

## 2025-04-20 ENCOUNTER — OFFICE VISIT (OUTPATIENT)
Dept: URGENT CARE | Age: 25
End: 2025-04-20
Payer: COMMERCIAL

## 2025-04-20 VITALS — TEMPERATURE: 99.4 F | RESPIRATION RATE: 18 BRPM | HEART RATE: 93 BPM | OXYGEN SATURATION: 97 %

## 2025-04-20 DIAGNOSIS — J02.9 ACUTE PHARYNGITIS, UNSPECIFIED ETIOLOGY: ICD-10-CM

## 2025-04-20 DIAGNOSIS — H10.33 ACUTE BACTERIAL CONJUNCTIVITIS OF BOTH EYES: Primary | ICD-10-CM

## 2025-04-20 PROCEDURE — S9083 URGENT CARE CENTER GLOBAL: HCPCS | Performed by: FAMILY MEDICINE

## 2025-04-20 PROCEDURE — G0382 LEV 3 HOSP TYPE B ED VISIT: HCPCS | Performed by: FAMILY MEDICINE

## 2025-04-20 RX ORDER — AZITHROMYCIN 250 MG/1
TABLET, FILM COATED ORAL
Qty: 6 TABLET | Refills: 0 | Status: SHIPPED | OUTPATIENT
Start: 2025-04-20 | End: 2025-04-24

## 2025-04-20 RX ORDER — POLYMYXIN B SULFATE AND TRIMETHOPRIM 1; 10000 MG/ML; [USP'U]/ML
1 SOLUTION OPHTHALMIC EVERY 4 HOURS
Qty: 10 ML | Refills: 0 | Status: SHIPPED | OUTPATIENT
Start: 2025-04-20

## 2025-04-20 NOTE — PROGRESS NOTES
[facility]        NAME: Sasha Palacios is a 25 y.o. female  : 2000    MRN: [id number]  DATE: 2025  TIME: 12:21 PM    Assessment and Plan   Acute bacterial conjunctivitis of both eyes [H10.33]  1. Acute bacterial conjunctivitis of both eyes  polymyxin b-trimethoprim (POLYTRIM) ophthalmic solution      2. Acute pharyngitis, unspecified etiology  azithromycin (ZITHROMAX) 250 mg tablet            Patient Instructions       Follow up with PCP in 3-5 days.  Proceed to  ER if symptoms worsen.    If tests have been performed at [facility], our office will contact you with results if changes need to be made to the care plan discussed with you at the visit.  [facility]    Chief Complaint     Chief Complaint   Patient presents with   • Cold Like Symptoms     X 1 wk...    • Sore Throat   • Earache   • Conjunctivitis     Started on left and now both sides... green dc from both eyes..mild itch and irritation          History of Present Illness       HPI  Sasha Palacios is a 25 y.o. female  who presented to [facility] today with h/o increased redness in her right eye.  About one week ago her left eye was red, with green discharge and now the right eye si red with green discharge as well. She has not tried any treatment for the eyes.  Later during the week, started to also have nasal congestion, sore throat and sinus pressure.  She tried nasal saline with some relief.  She noticed a slight fever today.    Review of Systems   Review of Systems   Constitutional:  Positive for fever. Negative for chills.   HENT:  Positive for congestion, postnasal drip, sinus pressure and sore throat. Negative for rhinorrhea.    Eyes:  Positive for pain, discharge and redness.   Respiratory:  Negative for cough and shortness of breath.    Cardiovascular:  Negative for chest pain.   Gastrointestinal:  Negative for diarrhea, nausea and vomiting.   Skin:  Negative for  rash.   Neurological:  Negative for dizziness and headaches.         Current Medications       Current Outpatient Medications:   •  azithromycin (ZITHROMAX) 250 mg tablet, Take 2 tablets today then 1 tablet daily x 4 days, Disp: 6 tablet, Rfl: 0  •  fluticasone (FLONASE) 50 mcg/act nasal spray, 1 spray into each nostril daily, Disp: 11.1 mL, Rfl: 0  •  intrauterine copper (PARAGARD) IUD, 1 each by Intrauterine Device route Once every 10 years, Disp: , Rfl:   •  polymyxin b-trimethoprim (POLYTRIM) ophthalmic solution, Administer 1 drop to both eyes every 4 (four) hours, Disp: 10 mL, Rfl: 0  •  acetaminophen (TYLENOL) 325 mg tablet, Take 2 tablets (650 mg total) by mouth every 4 (four) hours as needed for mild pain or headaches (Patient not taking: Reported on 4/20/2025), Disp: 30 tablet, Rfl: 0    Current Allergies     Allergies as of 04/20/2025 - Reviewed 04/20/2025   Allergen Reaction Noted   • Penicillins Hives 12/13/2016            The following portions of the patient's history were reviewed and updated as appropriate: allergies, current medications, past family history, past medical history, past social history, past surgical history and problem list.     Past Medical History:   Diagnosis Date   • Anxiety    • Depression    • Dizziness    • Ear problems    • Fatigue    • Heart murmur    • Hyperlipidemia    • Migraine    • VSD (ventricular septal defect and aortic arch hypoplasia        No past surgical history on file.    Family History   Problem Relation Age of Onset   • Stroke Mother    • Heart disease Father    • Stroke Maternal Grandmother          Medications have been verified.        Objective   Pulse 93   Temp 99.4 °F (37.4 °C)   Resp 18   LMP 04/02/2025 (Exact Date)   SpO2 97%   Patient's last menstrual period was 04/02/2025 (exact date).       Physical Exam     Physical Exam  Vitals and nursing note reviewed.   Constitutional:       General: She is not in acute distress.     Appearance: She is  well-developed.   HENT:      Head: Normocephalic and atraumatic.      Right Ear: Tympanic membrane and ear canal normal.      Left Ear: Tympanic membrane and ear canal normal.      Mouth/Throat:      Mouth: Mucous membranes are moist.      Pharynx: Posterior oropharyngeal erythema present.   Eyes:      Conjunctiva/sclera:      Right eye: Right conjunctiva is not injected.      Left eye: Left conjunctiva is injected.      Comments: Right eye injected greater than the left   Cardiovascular:      Rate and Rhythm: Tachycardia present.   Pulmonary:      Effort: Pulmonary effort is normal. No respiratory distress.      Breath sounds: Normal breath sounds.   Skin:     Findings: No rash.   Neurological:      Mental Status: She is alert and oriented to person, place, and time.   Psychiatric:         Mood and Affect: Mood normal.         Behavior: Behavior normal.

## 2025-04-20 NOTE — LETTER
April 20, 2025     Patient: Sasha Palacios   YOB: 2000   Date of Visit: 4/20/2025       To Whom It May Concern:    It is my medical opinion that Sasha Palacios should remain out of work until 4/22/2025 .    If you have any questions or concerns, please don't hesitate to call.         Sincerely,        Isabela Hurtado MD    CC: No Recipients

## 2025-04-20 NOTE — PATIENT INSTRUCTIONS
"Patient Education     Conjunctivitis (pink eye)   The Basics   Written by the doctors and editors at [facility]   What is pink eye? -- Pink eye is a term people use to describe an infection or irritation of the eye. The medical term for pink eye is \"conjunctivitis.\"  If you have pink eye, your eye (or eyes) might:   Turn pink or red   Weep or ooze a gooey liquid   Become itchy or burn   Get stuck shut, especially when you first wake up  Pink eye can be caused by an infection, allergies, or an unknown irritation.  Can you catch pink eye from someone else? -- Yes. When pink eye is caused by an infection, it can spread easily. Usually, people catch it from touching something that has been in contact with an infected person's eye. It can also be spread when an infected person touches someone else, and then that person touches their eye.  If someone you know has pink eye, avoid touching their pillowcases, towels, or other personal items.  When should I see a doctor or nurse? -- See your doctor or nurse if your eye hurts, or if you still have trouble seeing clearly after blinking. If you do not have these problems, but think you might have pink eye, your doctor or nurse might be able to give you advice over the phone.  Can pink eye be treated? -- Most cases of pink eye go away on their own without treatment. But some types of pink eye can be treated.  When pink eye is caused by infection, it is usually caused by a virus, so antibiotics will not help. Still, pink eye caused by a virus can last several days.   Pink eye caused by an infection with bacteria can be treated with antibiotic eye drops, gel, or ointment.   Pink eye caused by other problems can be treated with eye drops normally used to treat allergies. These drops will not cure the pink eye, but they can help with itchiness and irritation.  When using eye drops for infection, do not touch your healthy eye after touching your infected eye. Also, do not touch the " "bottle or dropper directly onto 1 eye and then use it in the other. These things can cause the infection to spread from 1 eye to the other.  If your eyelids feel swollen, it might also help to hold a cool wet cloth on the area.  What if I wear contact lenses? -- If you wear contact lenses and you have symptoms of pink eye, it is really important to have a doctor look at your eyes. In people who wear contacts, the symptoms of pink eye can be caused by \"corneal abrasion.\" Corneal abrasion is a scratch on the eye and can be a serious problem.  During treatment for eye infections, you might need to stop wearing your contacts for a short time. If your contacts are disposable, throw them away and use new ones. If your contacts are not disposable, you need to carefully clean them. You should also throw away your contact lens case and get a new one.  When can I go back to work or school? -- If you have pink eye caused by an infection, remember that it can spread very easily. The best way to avoid spreading it is to stay away from other people until you no longer have symptoms. If this is not possible, wash your hands often (figure 1). It's also important to avoid touching your eyes and sharing items that could spread the infection.  Schools and day cares usually have rules about when a child with pink eye can return. If a child has a bacterial infection, they will probably need to stay home until they have gotten antibiotic eye drops or ointment for 24 hours.  Can pink eye be prevented? -- To keep from getting or spreading pink eye caused by an infection:   Wash your hands often with soap and water.   Try not to touch your eyes.   Avoid sharing towels, bedding, or other personal items with a person who has pink eye.  If your pink eye is caused by allergies, it might help to stay inside with the windows shut as much as possible during peak allergy seasons.  What problems should I watch for? -- Call your doctor or nurse if:   " You have trouble seeing clearly after blinking.   Your eye is still red or has drainage after 3 days.   You have eye pain that is getting worse.  All topics are updated as new evidence becomes available and our peer review process is complete.  This topic retrieved from [employer] on: Feb 28, 2024.  Topic 72637 Version 20.0  Release: 32.2.4 - C32.58  © 2024 UpToDate, Inc. and/or its affiliates. All rights reserved.  figure 1: How to wash your hands     Wet your hands with clean water, and apply a small amount of soap. Lather and rub hands together for at least 20 seconds. Clean your wrists, palms, backs of your hands, between your fingers, tips of your fingers, thumbs, and under and around your nails. Rinse well, and dry your hands using a clean towel.  Graphic 881451 Version 7.0  Consumer Information Use and Disclaimer   Disclaimer: This generalized information is a limited summary of diagnosis, treatment, and/or medication information. It is not meant to be comprehensive and should be used as a tool to help the user understand and/or assess potential diagnostic and treatment options. It does NOT include all information about conditions, treatments, medications, side effects, or risks that may apply to a specific patient. It is not intended to be medical advice or a substitute for the medical advice, diagnosis, or treatment of a health care provider based on the health care provider's examination and assessment of a patient's specific and unique circumstances. Patients must speak with a health care provider for complete information about their health, medical questions, and treatment options, including any risks or benefits regarding use of medications. This information does not endorse any treatments or medications as safe, effective, or approved for treating a specific patient. UpToDate, Inc. and its affiliates disclaim any warranty or liability relating to this information or the use thereof.The use of this  information is governed by the Terms of Use, available at https://www.wolterskluwer.com/en/know/clinical-effectiveness-terms. 2024© [employer] and its affiliates and/or licensors. All rights reserved.  Copyright   © 2024 [employer] and/or its affiliates. All rights reserved.

## 2025-04-21 VITALS
HEART RATE: 69 BPM | DIASTOLIC BLOOD PRESSURE: 74 MMHG | SYSTOLIC BLOOD PRESSURE: 129 MMHG | TEMPERATURE: 97.1 F | OXYGEN SATURATION: 96 % | RESPIRATION RATE: 18 BRPM

## 2025-04-21 PROCEDURE — 99284 EMERGENCY DEPT VISIT MOD MDM: CPT

## 2025-04-22 ENCOUNTER — HOSPITAL ENCOUNTER (EMERGENCY)
Facility: HOSPITAL | Age: 25
Discharge: HOME | End: 2025-04-22
Attending: EMERGENCY MEDICINE
Payer: COMMERCIAL

## 2025-04-22 DIAGNOSIS — T83.9XXA IUD COMPLICATION (HCC): ICD-10-CM

## 2025-04-22 DIAGNOSIS — N93.9 VAGINAL BLEEDING: Primary | ICD-10-CM

## 2025-04-22 LAB
EXT PREGNANCY TEST URINE: NEGATIVE
EXT. CONTROL: NORMAL

## 2025-04-22 PROCEDURE — 81025 URINE PREGNANCY TEST: CPT

## 2025-04-22 PROCEDURE — 99284 EMERGENCY DEPT VISIT MOD MDM: CPT | Performed by: EMERGENCY MEDICINE

## 2025-04-22 PROCEDURE — 96372 THER/PROPH/DIAG INJ SC/IM: CPT

## 2025-04-22 RX ORDER — ACETAMINOPHEN 325 MG/1
975 TABLET ORAL ONCE
Status: COMPLETED | OUTPATIENT
Start: 2025-04-22 | End: 2025-04-22

## 2025-04-22 RX ORDER — KETOROLAC TROMETHAMINE 30 MG/ML
15 INJECTION, SOLUTION INTRAMUSCULAR; INTRAVENOUS ONCE
Status: COMPLETED | OUTPATIENT
Start: 2025-04-22 | End: 2025-04-22

## 2025-04-22 RX ADMIN — ACETAMINOPHEN 975 MG: 325 TABLET, FILM COATED ORAL at 00:43

## 2025-04-22 RX ADMIN — KETOROLAC TROMETHAMINE 15 MG: 30 INJECTION, SOLUTION INTRAMUSCULAR; INTRAVENOUS at 00:43

## 2025-04-22 NOTE — DISCHARGE INSTRUCTIONS
You were seen in the emergency department today for vaginal bleeding / pelvic pain.    Your testing showed your pregnancy test was negative. Your IUD strings were in place.     Follow up with your OB/GYN this week.     Take Tylenol / Ibuprofen as needed following the instructions on the bottle.     Return to the emergency department for any new or concerning symptoms including worsening pain, significant vaginal bleeding (more than 1 pad an hour).   Abstain from intercourse / tampon use until you follow up with your OB/GYN.     Thank you for choosing [facility] for your care today.

## 2025-04-22 NOTE — Clinical Note
Sasha Palacios was seen and treated in our emergency department on 4/21/2025.    No restrictions            Diagnosis:     Sasha  may return to work on return date.    She may return on this date: 04/23/2025         If you have any questions or concerns, please don't hesitate to call.      Anay Benavides, DO    ______________________________           _______________          _______________  Hospital Representative                              Date                                Time

## 2025-04-22 NOTE — ED PROVIDER NOTES
Time reflects when diagnosis was documented in both MDM as applicable and the Disposition within this note       Time User Action Codes Description Comment    4/22/2025 12:56 AM Anay Benavides Add [N93.9] Vaginal bleeding     4/22/2025 12:56 AM Anay Benavides Add [T83.9XXA] IUD complication (HCC)           ED Disposition       ED Disposition   Discharge    Condition   Stable    Date/Time   Tue Apr 22, 2025 12:56 AM    Comment   Sasha Palacios discharge to home/self care.                   Assessment & Plan       Medical Decision Making  Amount and/or Complexity of Data Reviewed  Labs: ordered. Decision-making details documented in ED Course.    Risk  OTC drugs.  Prescription drug management.    Patient is a 25 y.o. female with PMH of recent IUD insertion who presents to the ED with vaginal bleeding, pelvic pain.    Vital signs stable. On exam pelvic exam - no bleeding, IUD strings in place.    Differential diagnosis included but not limited to dislodged IUD, pregnancy, vaginal laceration, menses.     Plan: speculum examination, urine pregnancy. Will give tylenol / toradol    View ED course for further discussion on patient workup.     On review of previous records reviewed OB/GYN note from 4/10/25 - patient had copper IUD inserted without complication.    All labs reviewed and utilized in the medical decision making process  All radiology studies independently viewed by me and interpreted by the radiologist.  I reviewed all testing with the patient.     Upon re-evaluation patient resting comfortably.    Disposition: I have reviewed the patient's vital signs, nursing notes, and other relevant tests/information. I had a detailed discussion with the patient regarding the history, exam findings, and any diagnostic results.   Plan to discharge home in stable condition, follow up with OB/GYN.  Discussed with patient who is agreeable to plan.  I discussed discharge instructions, need for follow-up, and oral return  "precautions for what to return for in addition to the written return precautions and discharge instructions, specifically highlighting areas of special concern.  The patient verbalized understanding of the discharge instructions and warnings that would necessitate return to the Emergency Department including worsening pain, bleeding.  All questions the patient had were answered prior to discharge to the best of my ability.       Portions of the record may have been created with voice recognition software. Occasional wrong word or \"sound a like\" substitutions may have occurred due to the inherent limitations of voice recognition software. Read the chart carefully and recognize, using context, where substitutions have occurred.      ED Course as of 04/22/25 0228 Tue Apr 22, 2025 0049 PREGNANCY TEST URINE: Negative       Medications   acetaminophen (TYLENOL) tablet 975 mg (975 mg Oral Given 4/22/25 0043)   ketorolac (TORADOL) injection 15 mg (15 mg Intramuscular Given 4/22/25 0043)       ED Risk Strat Scores                    No data recorded                            History of Present Illness       Chief Complaint   Patient presents with    Vaginal Bleeding     Pt got an IUD over a week ago, has been having bad cramps since placement and cannot feel the strings to the IUD. Pt has intercourse yesterday which was painful and then had some spotting        Past Medical History:   Diagnosis Date    Anxiety     Depression     Dizziness     Ear problems     Fatigue     Heart murmur     Hyperlipidemia     Migraine     VSD (ventricular septal defect and aortic arch hypoplasia       History reviewed. No pertinent surgical history.   Family History   Problem Relation Age of Onset    Stroke Mother     Heart disease Father     Stroke Maternal Grandmother       Social History     Tobacco Use    Smoking status: Every Day     Types: E-Cigarettes    Smokeless tobacco: Never    Tobacco comments:     vaping   Vaping Use    Vaping " status: Every Day    Substances: Nicotine, Flavoring   Substance Use Topics    Alcohol use: No    Drug use: No      E-Cigarette/Vaping    E-Cigarette Use Current Every Day User       E-Cigarette/Vaping Substances    Nicotine Yes     THC No     CBD No     Flavoring Yes     Other No     Unknown No       I have reviewed and agree with the history as documented.     HPI  Patient is a 25 y.o. female with history of recent IUD insertion presenting to the emergency department for pelvic pain. Patient states that she had a copper IUD inserted on 4/10/25. She states that yesterday she developed some lower abdominal cramping and bleeding. Last night she had vaginal intercourse and again had some pain and bleeding. She states that she tried to feel for her IUD strings afterwards and was unable to find them, so she came to the ED for evaluation. Denies having any bowel / bladder changes, lightheadedness, dizziness.     Review of Systems   Constitutional:  Negative for fever.   HENT:  Negative for congestion.    Eyes:  Negative for visual disturbance.   Respiratory:  Negative for shortness of breath.    Cardiovascular:  Negative for chest pain.   Gastrointestinal:  Negative for abdominal pain, diarrhea and vomiting.   Endocrine: Negative for polyuria.   Genitourinary:  Positive for vaginal bleeding and vaginal pain. Negative for dysuria.   Musculoskeletal:  Negative for gait problem.   Skin:  Negative for rash.   Neurological:  Negative for dizziness.   All other systems reviewed and are negative.          Objective       ED Triage Vitals   Temperature Pulse Blood Pressure Respirations SpO2 Patient Position - Orthostatic VS   04/21/25 2357 04/21/25 2357 04/21/25 2357 04/21/25 2357 04/21/25 2357 04/21/25 2357   (!) 97.1 °F (36.2 °C) 69 129/74 18 96 % Lying      Temp Source Heart Rate Source BP Location FiO2 (%) Pain Score    04/21/25 2357 04/21/25 2357 04/21/25 2357 -- 04/22/25 0043    Temporal Monitor Right arm  7      Vitals       Date and Time Temp Pulse SpO2 Resp BP Pain Score FACES Pain Rating User   04/22/25 0043 -- -- -- -- -- 7 -- RJ   04/21/25 2357 97.1 °F (36.2 °C) 69 96 % 18 129/74 -- -- PT            Physical Exam  Vitals and nursing note reviewed.   Constitutional:       General: She is not in acute distress.     Appearance: She is not ill-appearing.   HENT:      Head: Normocephalic and atraumatic.      Mouth/Throat:      Mouth: Mucous membranes are moist.   Eyes:      Conjunctiva/sclera: Conjunctivae normal.   Cardiovascular:      Rate and Rhythm: Normal rate.   Pulmonary:      Effort: Pulmonary effort is normal. No respiratory distress.   Abdominal:      General: There is no distension.      Tenderness: There is no abdominal tenderness. There is no guarding.   Genitourinary:     Comments: Completed with nurse present as chaperone. Speculum examination completed, no vaginal bleeding, no significant vaginal discharge, IUD strings present coming out of cervix.   Musculoskeletal:         General: Normal range of motion.      Cervical back: Neck supple.   Skin:     General: Skin is warm.   Neurological:      General: No focal deficit present.      Mental Status: She is alert.   Psychiatric:         Mood and Affect: Mood normal.         Results Reviewed       Procedure Component Value Units Date/Time    POCT pregnancy, urine [[id number]]  (Normal) Collected: 04/22/25 0043    Lab Status: Final result Updated: 04/22/25 0043     EXT Preg Test, Ur Negative     Control Valid            No orders to display       Procedures    ED Medication and Procedure Management   Prior to Admission Medications   Prescriptions Last Dose Informant Patient Reported? Taking?   acetaminophen (TYLENOL) 325 mg tablet  Self No No   Sig: Take 2 tablets (650 mg total) by mouth every 4 (four) hours as needed for mild pain or headaches   Patient not taking: Reported on 4/20/2025   azithromycin (ZITHROMAX) 250 mg tablet   No No   Sig: Take 2 tablets today then 1  tablet daily x 4 days   fluticasone (FLONASE) 50 mcg/act nasal spray   No No   Si spray into each nostril daily   intrauterine copper (PARAGARD) IUD   Yes No   Si each by Intrauterine Device route Once every 10 years   polymyxin b-trimethoprim (POLYTRIM) ophthalmic solution   No No   Sig: Administer 1 drop to both eyes every 4 (four) hours      Facility-Administered Medications: None     Discharge Medication List as of 2025 12:57 AM        CONTINUE these medications which have NOT CHANGED    Details   acetaminophen (TYLENOL) 325 mg tablet Take 2 tablets (650 mg total) by mouth every 4 (four) hours as needed for mild pain or headaches, Starting Wed 11/3/2021, Normal      azithromycin (ZITHROMAX) 250 mg tablet Take 2 tablets today then 1 tablet daily x 4 days, Normal      fluticasone (FLONASE) 50 mcg/act nasal spray 1 spray into each nostril daily, Starting Tue 2024, Normal      intrauterine copper (PARAGARD) IUD 1 each by Intrauterine Device route Once every 10 years, Historical Med      polymyxin b-trimethoprim (POLYTRIM) ophthalmic solution Administer 1 drop to both eyes every 4 (four) hours, Starting Sun 2025, Normal           No discharge procedures on file.  ED SEPSIS DOCUMENTATION   Time reflects when diagnosis was documented in both MDM as applicable and the Disposition within this note       Time User Action Codes Description Comment    2025 12:56 AM Anay Benavides [N93.9] Vaginal bleeding     2025 12:56 AM Anay Benavides [T83.9XXA] IUD complication (HCC)                  Anay Benavides DO  25 [phone]

## 2025-04-23 NOTE — ED ATTENDING ATTESTATION
4/21/2025  IShashank DO, saw and evaluated the patient. I have discussed the patient with the resident/non-physician practitioner and agree with the resident's/non-physician practitioner's findings, Plan of Care, and MDM as documented in the resident's/non-physician practitioner's note, except where noted. All available labs and Radiology studies were reviewed.  I was present for key portions of any procedure(s) performed by the resident/non-physician practitioner and I was immediately available to provide assistance.       At this point I agree with the current assessment done in the Emergency Department.  I have conducted an independent evaluation of this patient a history and physical is as follows:    This is a 25-year-old female who presents emergency department with noted vaginal spotting and bleeding.  The patient is concerned for possible misplacement of her IUD.    No evidence of vaginal discharge, no fevers or chills,    Present examination shows noted evidence of IUD in place.  Scant amount of vaginal discharge at this point in time negative for pregnancy.    Differential diagnosis includes dysfunctional uterine bleeding, menstrual cycle, vaginal irritation    At this point in time the IUD is in place and not dislodged, negative for pregnancy, no vaginal lacerations noted on examination from resident.  Plan at this point in time will be discharged and follow-up with OB/GYN.    Pt re-examined and evaluated after testing and treatment. Spoke with the patient and feeling improved and sxs have resolved. Will discharge home with close f/u with pcp and instructed to return to the ED if sxs worsen or continue. Pt agrees with the plan for discharge and feels comfortable to go home with proper f/u. Advised to return for worsening or additional problems. Diagnostic tests were reviewed and questions answered. Diagnosis, care plan and treatment options were discussed. The patient understand instructions and  will follow up as directed.  Counseling: I had a detailed discussion with the patient and/or guardian regarding: the historical points, exam findings, and any diagnostic results supporting the discharge diagnosis, lab results, radiology results, discharge instructions reviewed with patient and/or family/caregiver and understanding was verbalized. Instructions given to return to the emergency department if symptoms worsen or persist, or if there are any questions or concerns that arise at home.  All labs reviewed and utilized in the medical decision making process  All radiology studies independently viewed by me and interpreted by the radiologist.    ED Course         Critical Care Time  Procedures

## 2025-05-05 ENCOUNTER — NURSE TRIAGE (OUTPATIENT)
Age: 25
End: 2025-05-05

## 2025-05-05 NOTE — TELEPHONE ENCOUNTER
"FOLLOW UP:   Epic Secure Chat sent to Dr Pierson- on call     REASON FOR CONVERSATION: Contraception    SYMPTOMS: 8/10 pain, pt is doubled over, 3-4/10 back pain, and saturating a pad every hour- no clots.    OTHER:   Pt calling in saying that she has a copper IUD placed on 4/10/25, pt saying that she's having pain that is sharp 8/10 pain that goes into her lower back 3-4/10. Pts has pain with sexual intercourse, and with tampon use. Pt says she's doubled over in pain. Pt saying that she's been bleeding \"for a month straight\". Pt is changing a pad every hour, pt denies clots. Pt was seen in ED on 4/22/25 for bleeding and pain.     Pt is advised to go to the ED now for further evaluation due to 8/10 pain, and bleeding, pt verbalized understanding saying she will go to [facility], and will have someone drive her to the ED.         DISPOSITION: Go to ED/UCC Now (Or to Office with PCP Approval)      Reason for Disposition   Patient sounds very sick or weak to the triager    Answer Assessment - Initial Assessment Questions  1. TYPE: \"What type of IUD do you have?\"       Copper   2. START DATE:  \"When was your IUD inserted?\" (e.g., date; weeks, months, years ago)       4/10/25   3. SYMPTOM: \"What is the main symptom (or question) you're concerned about?\"       Vaginal bleeding   4. ONSET: \"When did the  symptoms  start?\"      4/22/25  5. VAGINAL BLEEDING: \"Are you having any unusual vaginal bleeding?\"       Pt is saturating a pad every hour   6. ABDOMEN OR PELVIC PAIN: \"Are you having any pain in your abdomen or pelvic area?\" (Scale: 0, 1-10; none, mild, moderate, severe)      Sharp 8/10 pain   7. FEVER: \"Is there a fever?\" If Yes, ask: \"What is the temperature, how was it measured, and when did it start?\"      Pt denies fevers   8. PREGNANCY: \"Are you concerned that you might be pregnant?\" \"When was your last menstrual period?\"      Pt denies, LMP 4/2/25.    Protocols used: Contraception - IUD Symptoms and " Questions-Adult-[place]

## 2025-05-24 ENCOUNTER — TELEMEDICINE (OUTPATIENT)
Dept: OTHER | Facility: HOSPITAL | Age: 25
End: 2025-05-24

## 2025-05-24 DIAGNOSIS — N39.0 URINARY TRACT INFECTION WITHOUT HEMATURIA, SITE UNSPECIFIED: Primary | ICD-10-CM

## 2025-05-24 RX ORDER — NITROFURANTOIN 25; 75 MG/1; MG/1
100 CAPSULE ORAL 2 TIMES DAILY
Qty: 10 CAPSULE | Refills: 0 | Status: SHIPPED | OUTPATIENT
Start: 2025-05-24 | End: 2025-05-29

## 2025-05-24 NOTE — PROGRESS NOTES
Virtual Regular Visit  Name: Sasha Palacios      : 2000      MRN: [id number]  Encounter Provider: Blank Sterling PA-C  Encounter Date: 2025   Encounter department: VIRTUAL CARE   :  Assessment & Plan  Urinary tract infection without hematuria, site unspecified    Orders:    nitrofurantoin (MACROBID) 100 mg capsule; Take 1 capsule (100 mg total) by mouth 2 (two) times a day for 5 days  Discussed with patient that the labs will not be open until Tuesday to give urine culture.  We will start treatment beforehand.  We should symptoms never resolve or should symptoms immediately reappear after antibiotic has been stopped patient should get urine culture prior to any other treatment.  Take Nitrofurantoin as prescribed  Discussed can use over-the-counter Azo for symptomatic relief for the next 24 hours.  Noted change in urine color and possibility of staining clothes.  Drink plenty of water   Cranberry supplements  Urinate within 5 minutes following intercourse  Follow up with OB/GYN   If tests have been ordered our office will contact you with results if changes need to be made to the care plan discussed with you at the visit.  You can review your full results on [facility]  Follow up with PCP in 3-5 days.  Proceed to  ER if symptoms worsen.  If you need to contact care everywhere please call [phone]          History of Present Illness     This is a 25-year-old female with a past medical history of VSD here complaining of frequency, urgency and burning with urination for the last 3 days. Notes suprapubic cramping but denies any severe abdominal pain.  She states she took a home UTI test which was positive.  She denies abdominal pain, back pain, fever, vomiting, shortness of breath, diarrhea, chest pain or blood in the urine.  She has taken Azo for symptoms.  Patient has a ParaGard so there is no chance of pregnancy.      Review of Systems   Constitutional:  Negative for fever.    Respiratory:  Negative for shortness of breath.    Cardiovascular:  Negative for chest pain.   Gastrointestinal:  Negative for abdominal pain, diarrhea and vomiting.   Genitourinary:  Positive for dysuria, frequency and urgency. Negative for flank pain and hematuria.       Objective   There were no vitals taken for this visit.    Physical Exam  Constitutional:       General: She is not in acute distress.     Appearance: Normal appearance. She is not ill-appearing, toxic-appearing or diaphoretic.   Pulmonary:      Effort: Pulmonary effort is normal.      Comments: Patient able to speak in multiple full sentences without needing to break or pause.    Abdominal:      Tenderness: There is no abdominal tenderness. There is no right CVA tenderness or left CVA tenderness.     Skin:     Comments: No rashes noted on head or neck.       Neurological:      General: No focal deficit present.      Mental Status: She is alert and oriented to person, place, and time.     Psychiatric:         Mood and Affect: Mood normal.         Behavior: Behavior normal.         Administrative Statements   Encounter provider Blank Sterling PA-C    The Patient is located at Home and in the following state in which I hold an active license [place].    The patient was identified by name and date of birth. Sasha Palacios was informed that this is a telemedicine visit and that the visit is being conducted through the Epic Embedded platform. She agrees to proceed..  My office door was closed. No one else was in the room.  She acknowledged consent and understanding of privacy and security of the video platform. The patient has agreed to participate and understands they can discontinue the visit at any time.    I have spent a total time of 9 minutes in caring for this patient on the day of the visit/encounter including Risks and benefits of tx options, Instructions for management, Patient and family education, Documenting in the medical record, and  Obtaining or reviewing history  , not including the time spent for establishing the audio/video connection.

## 2025-05-25 ENCOUNTER — HOSPITAL ENCOUNTER (EMERGENCY)
Facility: HOSPITAL | Age: 25
Discharge: HOME | End: 2025-05-25
Attending: EMERGENCY MEDICINE | Admitting: EMERGENCY MEDICINE
Payer: COMMERCIAL

## 2025-05-25 VITALS
TEMPERATURE: 98.1 F | DIASTOLIC BLOOD PRESSURE: 70 MMHG | OXYGEN SATURATION: 99 % | RESPIRATION RATE: 18 BRPM | HEART RATE: 109 BPM | SYSTOLIC BLOOD PRESSURE: 145 MMHG

## 2025-05-25 DIAGNOSIS — R10.2 VAGINAL PAIN: ICD-10-CM

## 2025-05-25 DIAGNOSIS — N73.0 PID (ACUTE PELVIC INFLAMMATORY DISEASE): Primary | ICD-10-CM

## 2025-05-25 LAB
BACTERIA UR QL AUTO: ABNORMAL /HPF
BILIRUB UR QL STRIP: NEGATIVE
CLARITY UR: ABNORMAL
COLOR UR: YELLOW
EXT PREGNANCY TEST URINE: NEGATIVE
EXT. CONTROL: NORMAL
GLUCOSE UR STRIP-MCNC: NEGATIVE MG/DL
HGB UR QL STRIP.AUTO: ABNORMAL
KETONES UR STRIP-MCNC: ABNORMAL MG/DL
LEUKOCYTE ESTERASE UR QL STRIP: ABNORMAL
MUCOUS THREADS UR QL AUTO: ABNORMAL
NITRITE UR QL STRIP: NEGATIVE
NON-SQ EPI CELLS URNS QL MICRO: ABNORMAL /HPF
PH UR STRIP.AUTO: 6.5 [PH]
PROT UR STRIP-MCNC: ABNORMAL MG/DL
RBC #/AREA URNS AUTO: ABNORMAL /HPF
SP GR UR STRIP.AUTO: 1.02 (ref 1–1.03)
UROBILINOGEN UR STRIP-ACNC: 2 MG/DL
WBC #/AREA URNS AUTO: ABNORMAL /HPF

## 2025-05-25 PROCEDURE — 96372 THER/PROPH/DIAG INJ SC/IM: CPT

## 2025-05-25 PROCEDURE — 87491 CHLMYD TRACH DNA AMP PROBE: CPT | Performed by: EMERGENCY MEDICINE

## 2025-05-25 PROCEDURE — 87563 M. GENITALIUM AMP PROBE: CPT | Performed by: EMERGENCY MEDICINE

## 2025-05-25 PROCEDURE — 99284 EMERGENCY DEPT VISIT MOD MDM: CPT

## 2025-05-25 PROCEDURE — 87661 TRICHOMONAS VAGINALIS AMPLIF: CPT | Performed by: EMERGENCY MEDICINE

## 2025-05-25 PROCEDURE — 99284 EMERGENCY DEPT VISIT MOD MDM: CPT | Performed by: EMERGENCY MEDICINE

## 2025-05-25 PROCEDURE — 87591 N.GONORRHOEAE DNA AMP PROB: CPT | Performed by: EMERGENCY MEDICINE

## 2025-05-25 PROCEDURE — 81001 URINALYSIS AUTO W/SCOPE: CPT

## 2025-05-25 PROCEDURE — 81025 URINE PREGNANCY TEST: CPT

## 2025-05-25 PROCEDURE — 81514 NFCT DS BV&VAGINITIS DNA ALG: CPT | Performed by: EMERGENCY MEDICINE

## 2025-05-25 RX ORDER — HYDROCORTISONE 25 MG/G
CREAM TOPICAL 2 TIMES DAILY
Qty: 28 G | Refills: 0 | Status: SHIPPED | OUTPATIENT
Start: 2025-05-25

## 2025-05-25 RX ORDER — DOXYCYCLINE 100 MG/1
100 CAPSULE ORAL 2 TIMES DAILY
Qty: 28 CAPSULE | Refills: 0 | Status: SHIPPED | OUTPATIENT
Start: 2025-05-25 | End: 2025-05-25

## 2025-05-25 RX ORDER — DOXYCYCLINE 100 MG/1
100 CAPSULE ORAL ONCE
Status: COMPLETED | OUTPATIENT
Start: 2025-05-25 | End: 2025-05-25

## 2025-05-25 RX ORDER — DOXYCYCLINE 100 MG/1
100 CAPSULE ORAL 2 TIMES DAILY
Qty: 28 CAPSULE | Refills: 0 | Status: SHIPPED | OUTPATIENT
Start: 2025-05-25 | End: 2025-06-08

## 2025-05-25 RX ADMIN — LIDOCAINE HYDROCHLORIDE 500 MG: 10 INJECTION, SOLUTION EPIDURAL; INFILTRATION; INTRACAUDAL; PERINEURAL at 14:23

## 2025-05-25 RX ADMIN — DOXYCYCLINE 100 MG: 100 CAPSULE ORAL at 14:23

## 2025-05-25 NOTE — DISCHARGE INSTRUCTIONS
Please follow up with your primary care provider concerning your visit today within the next 1-3 days.  Please return to the Emergency Department for any other concerns or worsening symptoms.     An ambulatory referral to gynecology has been placed for you, you should be contacted in 1 to 2 days in order to set up an appointment, if you are not contacted in that time please contact the provided clinic number.

## 2025-05-25 NOTE — ED PROVIDER NOTES
Time reflects when diagnosis was documented in both MDM as applicable and the Disposition within this note       Time User Action Codes Description Comment    5/25/2025  2:10 PM Scott Mitchell [N73.0] PID (acute pelvic inflammatory disease)     5/25/2025  2:51 PM Scott Mitchell Add [R10.2] Vaginal pain           ED Disposition       ED Disposition   Discharge    Condition   Stable    Date/Time   Sun May 25, 2025  2:14 PM    Comment   Sasha Palacios discharge to home/self care.                   Assessment & Plan       Medical Decision Making  25-year-old female presents emergency department with complaint of burning with urination.  Differential diagnosis includes but is not limited to UTI, vaginal tear, PID.  Urinalysis showing evidence of potential infection.  Speculum exam notable for purulent drainage from cervical os as well as vaginal fissure.  Concern for possible PID.  Patient treated with Rocephin as well as doxycycline.  Rx as below.  Patient provided with Anusol prescription however was discharged prior to sending prescription.  Called patient via cell phone listed in chart, noted that patient should take Anusol as topical area to fissure, and advised patient not to insert vaginally.  Patient expressed agreement and understanding.   Patient appears well, nontoxic, agrees with plan of care at this time.  Answered all questions.  In light of this, patient would benefit from outpatient follow-up.    Amount and/or Complexity of Data Reviewed  Labs: ordered.    Risk  Prescription drug management.             Medications   cefTRIAXone (ROCEPHIN) 500 mg in lidocaine (PF) (XYLOCAINE-MPF) 1 % IM only syringe (500 mg Intramuscular Given 5/25/25 1423)   doxycycline hyclate (VIBRAMYCIN) capsule 100 mg (100 mg Oral Given 5/25/25 1423)       ED Risk Strat Scores                    No data recorded                            History of Present Illness       Chief Complaint   Patient presents with     "Possible UTI     Pt reports she took [facility] UTI test that was positive. Pt developed cramping, discovered hard lump \"on underwear line\". Pt also reports, \"on the outside, it kind of hurts\" in region of labia. Pt endorses lower abd pain, denies flank or back pain or blood in urine. +burning with urination       Past Medical History[1]   Past Surgical History[2]   Family History[3]   Social History[4]   E-Cigarette/Vaping    E-Cigarette Use Current Every Day User       E-Cigarette/Vaping Substances    Nicotine Yes     THC No     CBD No     Flavoring Yes     Other No     Unknown No       I have reviewed and agree with the history as documented.     (Sasha Palacios) Sasha Palacios is a 25 y.o. female     They presented to the emergency department on May 25, 2025. Patient presents with:  Possible UTI: Pt reports she took [facility] UTI test that was positive. Pt developed cramping, discovered hard lump \"on underwear line\". Pt also reports, \"on the outside, it kind of hurts\" in region of labia. Pt endorses lower abd pain, denies flank or back pain or blood in urine. +burning with urination.    The patient states that she began noticing pain with urination at the bottom of her vagina.  Patient notes that she has also been having abdominal cramping.  Patient notes that she is sexually active, has IUD placed for birth control.  Patient has not noticed any new vaginal discharge, or bleeding. Patient denies fever, chills, chest pain, difficulty breathing, nausea, vomiting, change in bowel habits, or any other complaint at this time.              Review of Systems   Constitutional:  Negative for chills and fever.   HENT:  Negative for ear pain and sore throat.    Eyes:  Negative for pain and visual disturbance.   Respiratory:  Negative for cough and shortness of breath.    Cardiovascular:  Negative for chest pain and palpitations.   Gastrointestinal:  Positive for abdominal pain. Negative for constipation, diarrhea, nausea and " vomiting.   Genitourinary:  Positive for vaginal pain. Negative for dysuria and hematuria.   Musculoskeletal:  Negative for arthralgias and back pain.   Skin:  Negative for color change and rash.   Neurological:  Negative for seizures and syncope.   All other systems reviewed and are negative.          Objective       ED Triage Vitals   Temperature Pulse Blood Pressure Respirations SpO2 Patient Position - Orthostatic VS   05/25/25 1158 05/25/25 1159 05/25/25 1159 05/25/25 1159 05/25/25 1159 --   98.1 °F (36.7 °C) (!) 109 145/70 18 99 %       Temp Source Heart Rate Source BP Location FiO2 (%) Pain Score    05/25/25 1158 05/25/25 1159 -- -- --    Oral Monitor         Vitals      Date and Time Temp Pulse SpO2 Resp BP Pain Score FACES Pain Rating User   05/25/25 1159 -- 109 99 % 18 145/70 -- -- KD   05/25/25 1158 98.1 °F (36.7 °C) -- -- -- -- -- -- KD            Physical Exam  Vitals and nursing note reviewed.   Constitutional:       General: She is not in acute distress.     Appearance: Normal appearance.   HENT:      Head: Normocephalic and atraumatic.      Right Ear: External ear normal.      Left Ear: External ear normal.      Nose: Nose normal.      Mouth/Throat:      Mouth: Mucous membranes are moist.     Eyes:      Conjunctiva/sclera: Conjunctivae normal.       Cardiovascular:      Rate and Rhythm: Normal rate and regular rhythm.   Pulmonary:      Effort: Pulmonary effort is normal. No respiratory distress.      Breath sounds: Normal breath sounds.   Abdominal:      General: Abdomen is flat. Bowel sounds are normal.      Tenderness: There is no abdominal tenderness. There is no guarding or rebound.   Genitourinary:             Comments: Infravaginal fissure appreciated, no bleeding    Musculoskeletal:         General: Normal range of motion.      Cervical back: Normal range of motion.     Skin:     General: Skin is warm and dry.      Capillary Refill: Capillary refill takes less than 2 seconds.     Neurological:       Mental Status: She is alert. Mental status is at baseline.     Psychiatric:         Mood and Affect: Mood normal.         Results Reviewed       Procedure Component Value Units Date/Time    Trichomonas vaginalis/Mycoplasma genitalium PCR [[id number]] Collected: 05/25/25 1424    Lab Status: In process Specimen: Genital from Endocervical Updated: 05/25/25 1433    Molecular Vaginal Panel [[id number]] Collected: 05/25/25 1424    Lab Status: In process Specimen: Genital from Vaginal Updated: 05/25/25 1432    Chlamydia/GC amplified DNA by PCR [[id number]] Collected: 05/25/25 1424    Lab Status: In process Specimen: Urine, Other Updated: 05/25/25 1432    Urine Microscopic [[id number]]  (Abnormal) Collected: 05/25/25 1223    Lab Status: Final result Specimen: Urine, Clean Catch Updated: 05/25/25 1245     RBC, UA 2-4 /hpf      WBC, UA Innumerable /hpf      Epithelial Cells Occasional /hpf      Bacteria, UA Occasional /hpf      MUCUS THREADS Occasional    UA (URINE) with reflex to Scope [[id number]]  (Abnormal) Collected: 05/25/25 1223    Lab Status: Final result Specimen: Urine, Clean Catch Updated: 05/25/25 1244     Color, UA Yellow     Clarity, UA Turbid     Specific Gravity, UA 1.024     pH, UA 6.5     Leukocytes, UA Large     Nitrite, UA Negative     Protein, UA Trace mg/dl      Glucose, UA Negative mg/dl      Ketones, UA Trace mg/dl      Urobilinogen, UA 2.0 mg/dl      Bilirubin, UA Negative     Occult Blood, UA Large    POCT pregnancy, urine [[id number]]  (Normal) Collected: 05/25/25 1234    Lab Status: Final result Specimen: Urine Updated: 05/25/25 1234     EXT Preg Test, Ur Negative     Control Valid            No orders to display       Procedures    ED Medication and Procedure Management   Prior to Admission Medications   Prescriptions Last Dose Informant Patient Reported? Taking?   acetaminophen (TYLENOL) 325 mg tablet  Self No No   Sig: Take 2 tablets (650 mg total) by mouth every 4 (four) hours as needed for mild  pain or headaches   fluticasone (FLONASE) 50 mcg/act nasal spray   No No   Si spray into each nostril daily   intrauterine copper (PARAGARD) IUD   Yes No   Si each by Intrauterine Device route Once every 10 years   nitrofurantoin (MACROBID) 100 mg capsule   No No   Sig: Take 1 capsule (100 mg total) by mouth 2 (two) times a day for 5 days   polymyxin b-trimethoprim (POLYTRIM) ophthalmic solution   No No   Sig: Administer 1 drop to both eyes every 4 (four) hours   Patient not taking: Reported on 2025      Facility-Administered Medications: None     Discharge Medication List as of 2025  2:14 PM        CONTINUE these medications which have CHANGED    Details   doxycycline monohydrate (MONODOX) 100 mg capsule Take 1 capsule (100 mg total) by mouth 2 (two) times a day for 14 days, Starting Sun 2025, Until Sun 2025, Normal           CONTINUE these medications which have NOT CHANGED    Details   acetaminophen (TYLENOL) 325 mg tablet Take 2 tablets (650 mg total) by mouth every 4 (four) hours as needed for mild pain or headaches, Starting Wed 11/3/2021, Normal      fluticasone (FLONASE) 50 mcg/act nasal spray 1 spray into each nostril daily, Starting Tue 2024, Normal      intrauterine copper (PARAGARD) IUD 1 each by Intrauterine Device route Once every 10 years, Historical Med      nitrofurantoin (MACROBID) 100 mg capsule Take 1 capsule (100 mg total) by mouth 2 (two) times a day for 5 days, Starting Sat 2025, Until Thu 2025, Normal      polymyxin b-trimethoprim (POLYTRIM) ophthalmic solution Administer 1 drop to both eyes every 4 (four) hours, Starting Sun 2025, Normal             ED SEPSIS DOCUMENTATION   Time reflects when diagnosis was documented in both MDM as applicable and the Disposition within this note       Time User Action Codes Description Comment    2025  2:10 PM Scott Mitchell [N73.0] PID (acute pelvic inflammatory disease)     2025  2:51 PM de  Scott Meza Add [R10.2] Vaginal pain                    [1]   Past Medical History:  Diagnosis Date    Anxiety     Depression     Dizziness     Ear problems     Fatigue     Heart murmur     Hyperlipidemia     Migraine     VSD (ventricular septal defect and aortic arch hypoplasia    [2] No past surgical history on file.  [3]   Family History  Problem Relation Name Age of Onset    Stroke Mother Jas Sahu     Heart disease Father bethany     Stroke Maternal Grandmother Ella Sahu    [4]   Social History  Tobacco Use    Smoking status: Every Day     Types: E-Cigarettes    Smokeless tobacco: Never    Tobacco comments:     vaping   Vaping Use    Vaping status: Every Day    Substances: Nicotine, Flavoring   Substance Use Topics    Alcohol use: No    Drug use: No        Scott Mitchell MD  05/25/25 0461

## 2025-05-25 NOTE — ED ATTENDING ATTESTATION
5/25/2025  I, Veronica Hdz MD, saw and evaluated the patient. I have discussed the patient with the resident/non-physician practitioner and agree with the resident's/non-physician practitioner's findings, Plan of Care, and MDM as documented in the resident's/non-physician practitioner's note, except where noted. All available labs and Radiology studies were reviewed.  I was present for key portions of any procedure(s) performed by the resident/non-physician practitioner and I was immediately available to provide assistance.       At this point I agree with the current assessment done in the Emergency Department.  I have conducted an independent evaluation of this patient a history and physical is as follows:    25-year-old female presenting for evaluation of pain with urination.  Patient is reporting pain inferior to her vaginal orifice when urine hits the area.  She is sexually active with 1 male partner and reports dyspareunia 1 week ago, with pain with attempted sexual activity yesterday.  Was unable to continue intercourse due to her level of discomfort.  She has not noticed vaginal discharge.  Denies flank pain, fevers, chills, or hematuria.  No nausea or vomiting.  Denies fevers or chills.  No change in her stool.  She uses an IUD for contraception.  Reports cramping pain in the suprapubic area.    Physical Exam  Vitals and nursing note reviewed.   Constitutional:       General: She is not in acute distress.     Appearance: She is well-developed. She is not ill-appearing, toxic-appearing or diaphoretic.   HENT:      Head: Normocephalic and atraumatic.      Right Ear: External ear normal.      Left Ear: External ear normal.      Nose: Nose normal.     Eyes:      Conjunctiva/sclera: Conjunctivae normal.       Cardiovascular:      Rate and Rhythm: Regular rhythm. Tachycardia present.      Pulses: Normal pulses.      Heart sounds: Normal heart sounds. No murmur heard.     No friction rub. No gallop.    Pulmonary:      Effort: Pulmonary effort is normal. No respiratory distress.      Breath sounds: Normal breath sounds. No wheezing or rales.   Abdominal:      General: Bowel sounds are normal. There is no distension.      Palpations: Abdomen is soft.      Tenderness: There is abdominal tenderness (tenderness to deep palpation in the suprapubic region only). There is no right CVA tenderness, left CVA tenderness or guarding.   Genitourinary:     Comments: Approximately 2 cm laceration at the 7 o'clock position about the vaginal orifice, hemostatic.  Copious white discharge within the vaginal vault and present at the cervical os.  Scant dark red blood around the cervix.  Cervix itself appears friable.    Musculoskeletal:         General: No deformity. Normal range of motion.      Cervical back: Normal range of motion and neck supple.      Right lower leg: No edema.      Left lower leg: No edema.     Skin:     General: Skin is warm and dry.     Neurological:      Mental Status: She is alert and oriented to person, place, and time.      Motor: No abnormal muscle tone.           ED Course  ED Course as of 05/25/25 1419   Sun May 25, 2025   1409 On exam patient has a laceration at the 7 o'clock position about the vaginal orifice which is the likely culprit for her pain following urination.  There is copious white discharge within the vaginal vault coming from the cervix.  Cervix itself is friable in appearance also with scant dark red blood in the vaginal vault.  Bimanual exam was deferred due to patient's level of discomfort with insertion of the speculum.  Will treat for PID.  Prescribed Anusol for application to the laceration with instructions not to place this within the vagina itself.  Patient has an OB/GYN that she can follow-up with.  Ambulatory referral will be provided to allow for more rapid follow-up.  Urine shows innumerable white blood cells and large leukocytes, likely secondary to vaginal infection.   Patient will be treated with ceftriaxone and 14 day course of doxycycline. Patient counseled regarding photosensitivity. If swabs reveal trichomonas then metronidazole will need to be added.          Critical Care Time  Procedures

## 2025-05-27 ENCOUNTER — NURSE TRIAGE (OUTPATIENT)
Age: 25
End: 2025-05-27

## 2025-05-27 LAB
C GLABRATA DNA VAG QL NAA+PROBE: NEGATIVE
C KRUSEI DNA VAG QL NAA+PROBE: NEGATIVE
C TRACH DNA SPEC QL NAA+PROBE: NEGATIVE
CANDIDA SP 6 PNL VAG NAA+PROBE: NEGATIVE
M GENITALIUM DNA SPEC QL NAA+PROBE: NEGATIVE
N GONORRHOEA DNA SPEC QL NAA+PROBE: NEGATIVE
T VAGINALIS DNA SPEC QL NAA+PROBE: NEGATIVE
T VAGINALIS DNA VAG QL NAA+PROBE: NEGATIVE
VAGINOSIS/ITIS DNA PNL VAG PROBE+SIG AMP: NEGATIVE

## 2025-05-27 NOTE — TELEPHONE ENCOUNTER
"REASON FOR CONVERSATION: Vaginal Pain    SYMPTOMS: pain in area of vaginal tear.  Pain increases when urinating    OTHER HEALTH INFORMATION: Seen in ED 5/25- DX PID, vaginal tear. Treated with ceftriaxone and doxycline x 14 days. Anusol cream    PROTOCOL DISPOSITION: See Within 2 Weeks in Office    CARE ADVICE PROVIDED: Wash with unscented mild soap such as dove/ivory.  Avoid tight restrictive clothing.  Cotton underwear.  Continue with anusol cream twice daily and ABX provided. Warm compress/sitz bath.    Apply aquaphor to area of vaginal fissure(tear) to provide comfort and barrier to urine coming in contact with open area.  Call back if having increasing symptoms or return to ED if having severe vaginal pain.  Keep appt for tomorrow as scheduled.    PRACTICE FOLLOW-UP: N/A                    Reason for Disposition   All other vaginal symptoms  (Exceptions: Feels like prior yeast infection, minor abrasion, mild rash < 24 hour duration, mild itching, vaginal dryness during sex.)    Answer Assessment - Initial Assessment Questions  1. SYMPTOM: \"What's the main symptom you're concerned about?\" (e.g., pain, itching, dryness)      Vaginal pain- per ER vaginal fissure  2. LOCATION: \"Where is the  pain located?\" (e.g., inside/outside, left/right)      labia  3. ONSET: \"When did the  pain  start?\"      One week ago  4. PAIN: \"Is there any pain?\" If Yes, ask: \"How bad is it?\" (Scale: 1-10; mild, moderate, severe)      Pain level 10, worse when urinating  5. ITCHING: \"Is there any itching?\" If Yes, ask: \"How bad is it?\" (Scale: 1-10; mild, moderate, severe)      Itching resolved  6. CAUSE: \"What do you think is causing the discharge?\" \"Have you had the same problem before?\" \"What happened then?\"      Discharge improving since starting ABX  7. OTHER SYMPTOMS: \"Do you have any other symptoms?\" (e.g., fever, itching, vaginal bleeding, pain with urination, injury to genital area, vaginal foreign body)      Intermittent pelvic " "shari  8. PREGNANCY: \"Is there any chance you are pregnant?\" \"When was your last menstrual period?\"      LMP-has IUD- intermittent bleeding.    Protocols used: Vaginal Symptoms-Adult-[place]    "

## 2025-05-27 NOTE — TELEPHONE ENCOUNTER
Regarding: er follow up vaginal tear and pelvic pain  ----- Message from Mendy CALLES sent at 5/27/2025  8:11 AM EDT -----  Patient called and was in the ER and having pain from vaginal tear.  I scheduled her for tomorrow and trying to see if there is anything today due to the pain she is having .  She can be reached at [phone]. Thank you

## 2025-05-28 ENCOUNTER — OFFICE VISIT (OUTPATIENT)
Dept: OBGYN CLINIC | Facility: CLINIC | Age: 25
End: 2025-05-28
Attending: EMERGENCY MEDICINE

## 2025-05-28 VITALS — SYSTOLIC BLOOD PRESSURE: 98 MMHG | BODY MASS INDEX: 20.88 KG/M2 | DIASTOLIC BLOOD PRESSURE: 60 MMHG | WEIGHT: 103.4 LBS

## 2025-05-28 DIAGNOSIS — A60.04 HERPES SIMPLEX VULVOVAGINITIS: Primary | ICD-10-CM

## 2025-05-28 DIAGNOSIS — Z97.5 IUD (INTRAUTERINE DEVICE) IN PLACE: ICD-10-CM

## 2025-05-28 DIAGNOSIS — R21 VULVAR RASH: ICD-10-CM

## 2025-05-28 DIAGNOSIS — R10.2 PELVIC CRAMPING: ICD-10-CM

## 2025-05-28 PROBLEM — Z30.42 ENCOUNTER FOR SURVEILLANCE OF INJECTABLE CONTRACEPTIVE: Status: RESOLVED | Noted: 2019-07-30 | Resolved: 2025-05-28

## 2025-05-28 PROBLEM — Z30.013 ENCOUNTER FOR INITIAL PRESCRIPTION OF INJECTABLE CONTRACEPTIVE: Status: RESOLVED | Noted: 2022-02-07 | Resolved: 2025-05-28

## 2025-05-28 PROCEDURE — 87529 HSV DNA AMP PROBE: CPT | Performed by: PHYSICIAN ASSISTANT

## 2025-05-28 RX ORDER — LIDOCAINE 50 MG/G
OINTMENT TOPICAL 3 TIMES DAILY PRN
Qty: 50 G | Refills: 1 | Status: SHIPPED | OUTPATIENT
Start: 2025-05-28

## 2025-05-28 RX ORDER — IBUPROFEN 800 MG/1
800 TABLET, FILM COATED ORAL EVERY 8 HOURS PRN
Qty: 30 TABLET | Refills: 0 | Status: SHIPPED | OUTPATIENT
Start: 2025-05-28

## 2025-05-28 RX ORDER — VALACYCLOVIR HYDROCHLORIDE 500 MG/1
500 TABLET, FILM COATED ORAL 2 TIMES DAILY
Qty: 180 TABLET | Refills: 1 | Status: SHIPPED | OUTPATIENT
Start: 2025-05-28 | End: 2025-08-26

## 2025-05-28 RX ORDER — VALACYCLOVIR HYDROCHLORIDE 1 G/1
1000 TABLET, FILM COATED ORAL 2 TIMES DAILY
Qty: 14 TABLET | Refills: 0 | Status: SHIPPED | OUTPATIENT
Start: 2025-05-28 | End: 2025-06-04

## 2025-05-28 NOTE — PROGRESS NOTES
Name: Sasha Palacios      : 2000      MRN: [id number]  Encounter Provider: Miladis Bernabe PA-C  Encounter Date: 2025   Encounter department: [facility]  :  Assessment & Plan  Herpes simplex vulvovaginitis    Orders:    valACYclovir (VALTREX) 1,000 mg tablet; Take 1 tablet (1,000 mg total) by mouth 2 (two) times a day for 7 days    lidocaine (XYLOCAINE) 5 % ointment; Apply topically 3 (three) times a day as needed for mild pain or moderate pain External vulvar lesions    ibuprofen (MOTRIN) 800 mg tablet; Take 1 tablet (800 mg total) by mouth every 8 (eight) hours as needed for moderate pain (pelvic cramping)    valACYclovir (VALTREX) 500 mg tablet; Take 1 tablet (500 mg total) by mouth 2 (two) times a day For HSV suppression. Start after completing the HSV treatment regimen.    HSV TYPE 1,2 DNA PCR [facility] ONLY    Patient presenting for ER follow-up for initial workup of UTI which was unremarkable.  Patient was noted to have a vaginal tear that was causing burning with urination and treated with Anusol.  Also concern for PID and was given Rocephin IM in ER, doxycycline orally upon discharge.  Subsequently molecular panel, GC CT and trichomonas/mycoplasma testing was all negative.    Today patient reports still with pain primarily in the vulva and vagina with swelling, mucousy discharge, painful to sit stand and walk.    Examination today most consistent with HSV.  Swabs of the lesions were obtained for confirmation.  Patient counseled extensively today regarding highly likely HSV infection with all questions answered to her satisfaction.  Will have pt start valtrex treatment 1000 mg twice daily x 7 days ASAP.  Topical lidocaine prescribed to alleviate some of the discomfort in the external vulvar area.  High-dose ibuprofen 800 mg was prescribed she will alternate with Tylenol.  Cold packs were also encouraged.    Patient advised the prognosis regarding HSV infection as well  as recommendation for suppressive therapy.  Patient desires suppressive therapy and will start this after the week course of the acute treatment, 500 mg twice daily.    Vulvar rash    Orders:    HSV TYPE 1,2 DNA PCR [facility] ONLY    Pelvic cramping    Orders:    ibuprofen (MOTRIN) 800 mg tablet; Take 1 tablet (800 mg total) by mouth every 8 (eight) hours as needed for moderate pain (pelvic cramping)    Patient diagnosed with PID in ER, STD screening negative.  Rocephin given IM in ER, she continues on doxycycline.  Interestingly she is due for her menstruation and started bleeding yesterday.  Pelvic cramping might be in the setting of IUD plus having her menstruation.    Patient had planned to have IUD removed on 6/3, however in the setting of her significant HSV infection, recent IUD placement and current menstruation pelvic pain could be secondary to all.    Ibuprofen 800 mg every 6/8 hours was prescribed, stay hydrated, heating pad was all encouraged.  Patient desired to monitor for now, will cancel IUD removal in 6/3 but will plan close follow-up with patient in about 4 weeks to reassess HSV and her pelvic pain, patient desires IUD removal at that time can remove    IUD (intrauterine device) in place    Placed 4/10 without complication.  Strings visualized on exam today.  Will revisit pelvic pain and desire to have IUD versus have removed in 4 to 6-week follow-up.           History of Present Illness   Yesica is a 25-year-old female presenting today for ER follow-up.    She initially presented 4/22 secondary to vaginal bleeding, seen in ED as she reported some pelvic pain, unable to feel IUD strings and some spotting postcoitally after intercourse..  She had the ParaGard IUD placed on 4/10.    She then returned back to the ED on 5/25 with chief complaint of burning with urination and was concerned about UTI as she did a test through [facility] that was positive.  She also notes pelvic cramping and vulvar  pain.  Prior she was seen through telemedicine for possible UTI for which Macrobid was prescribed.    Per ED there was concern for possible PID treated with Rocephin and doxycycline.  She was noted to have purulent drainage from cervical os on vaginal exam as well as a vaginal fissure.    Patient had chlamydia gonorrhea screen, trichomonas mycoplasma screen as well as molecular vaginal panel all of which was negative.  Urinalysis in ED was positive for leukocyte, trace protein and ketones, large occult blood.  Urine microscopic occasional mucus threads, innumerable WBC, 2-4 RBC.    Today patient reports started with vaginal bleeding yesterday, more so bloody mucous.  LMP was 4/24/25 - periods are usually regular   She also notes pelvic pain only when she bleeds.   She feels the sxs are worse than when she went into the ER as she states the cramping is more sharp.     She is still reporting a lot of vulvar pain and swelling, painful to move and sit.  She did note around the onset of the vulvar pain/vaginal tear a lump/swelling in the right groin.  States she was having intercourse which likely could be cause of the cut - using anusol as prescribed from ED and not helping.     She notes the cut was painful since last Tuesday, noticed after intercourse when it burned to urinate.     She reports positive HPV on Pap smear February 2025.  Denies history of known HSV              Review of Systems   Constitutional:  Negative for activity change, appetite change, chills and fever.   Gastrointestinal:  Negative for abdominal pain, constipation, diarrhea, nausea and vomiting.   Genitourinary:  Positive for dysuria, genital sores, pelvic pain (cramping), vaginal bleeding and vaginal pain (and vulvar). Negative for decreased urine volume, difficulty urinating, flank pain, frequency, hematuria, urgency and vaginal discharge.   Hematological:  Positive for adenopathy (right groin).          Objective   BP 98/60 (BP Location: Left  arm, Patient Position: Sitting, Cuff Size: Standard)   Wt 46.9 kg (103 lb 6.4 oz)   LMP 04/24/2025   BMI 20.88 kg/m²      Physical Exam  Vitals reviewed.   Constitutional:       Appearance: Normal appearance. She is not ill-appearing.   Abdominal:      Tenderness: There is abdominal tenderness in the suprapubic area. There is no right CVA tenderness, left CVA tenderness, guarding or rebound.   Genitourinary:     Vagina: Vaginal discharge (bloody, mucousy) and tenderness present. No erythema or lesions.      Cervix: Lesion and cervical bleeding (minimal from os) present. No cervical motion tenderness or friability.      Uterus: Tender.       Adnexa:         Right: No tenderness.          Left: No tenderness.                Comments: Significant rash noted of the vulva as well as extending down the right perineum around the anus.  Some open ulcer Type lesions with surrounding erythema and swelling, some still in vesicular stage most consistent with HSV.  Lymphadenopathy:      Lower Body: Right inguinal adenopathy present. No left inguinal adenopathy.     Neurological:      Mental Status: She is alert and oriented to person, place, and time.     Psychiatric:         Mood and Affect: Mood is not anxious or depressed. Affect is not labile or flat.         Speech: Speech normal.         Behavior: Behavior normal. Behavior is cooperative.

## 2025-05-29 ENCOUNTER — RESULTS FOLLOW-UP (OUTPATIENT)
Dept: OBGYN CLINIC | Facility: CLINIC | Age: 25
End: 2025-05-29

## 2025-05-29 LAB
HSV1 DNA SPEC QL NAA+PROBE: DETECTED
HSV1 DNA SPEC QL NAA+PROBE: NOT DETECTED

## 2025-06-15 ENCOUNTER — APPOINTMENT (EMERGENCY)
Dept: RADIOLOGY | Facility: HOSPITAL | Age: 25
End: 2025-06-15
Payer: COMMERCIAL

## 2025-06-15 ENCOUNTER — HOSPITAL ENCOUNTER (EMERGENCY)
Facility: HOSPITAL | Age: 25
Discharge: HOME | End: 2025-06-15
Attending: EMERGENCY MEDICINE | Admitting: EMERGENCY MEDICINE
Payer: COMMERCIAL

## 2025-06-15 VITALS
HEART RATE: 91 BPM | TEMPERATURE: 97.4 F | SYSTOLIC BLOOD PRESSURE: 127 MMHG | DIASTOLIC BLOOD PRESSURE: 75 MMHG | RESPIRATION RATE: 18 BRPM | OXYGEN SATURATION: 100 %

## 2025-06-15 DIAGNOSIS — M25.511 ACUTE PAIN OF RIGHT SHOULDER: Primary | ICD-10-CM

## 2025-06-15 DIAGNOSIS — S42.031A CLOSED DISPLACED FRACTURE OF ACROMIAL END OF RIGHT CLAVICLE, INITIAL ENCOUNTER: ICD-10-CM

## 2025-06-15 PROCEDURE — 73000 X-RAY EXAM OF COLLAR BONE: CPT

## 2025-06-15 PROCEDURE — 99284 EMERGENCY DEPT VISIT MOD MDM: CPT

## 2025-06-15 PROCEDURE — 93005 ELECTROCARDIOGRAM TRACING: CPT

## 2025-06-15 PROCEDURE — 73030 X-RAY EXAM OF SHOULDER: CPT

## 2025-06-15 PROCEDURE — 99284 EMERGENCY DEPT VISIT MOD MDM: CPT | Performed by: EMERGENCY MEDICINE

## 2025-06-15 RX ORDER — ACETAMINOPHEN 325 MG/1
650 TABLET ORAL ONCE
Status: COMPLETED | OUTPATIENT
Start: 2025-06-15 | End: 2025-06-15

## 2025-06-15 RX ORDER — IBUPROFEN 400 MG/1
400 TABLET, FILM COATED ORAL ONCE
Status: COMPLETED | OUTPATIENT
Start: 2025-06-15 | End: 2025-06-15

## 2025-06-15 RX ADMIN — ACETAMINOPHEN 650 MG: 325 TABLET ORAL at 11:02

## 2025-06-15 RX ADMIN — IBUPROFEN 400 MG: 400 TABLET, FILM COATED ORAL at 11:02

## 2025-06-15 NOTE — DISCHARGE INSTRUCTIONS
Schedule an appointment with your PCP to discuss today's ER visit and the syncopal episode. Schedule an appointment with the orthopedic surgeon to discuss treatment of the clavicle fracture. Wear the sling until told otherwise by the orthopedic surgeon.     You can take tylenol and/or motrin for pain as needed. Follow dosing instructions on the bottles. Apply ice pack or a bag of frozen vegetables wrapped in a towel over the painful part. Never put ice right on the skin. Do not leave the ice on more than 10 to 15 minutes at a time. Use for the first 24 to 48 hours after an injury.     Return to the ER if you experience severe uncontrolled pain, swelling/redness in your shoulder, or any other concerning symptoms

## 2025-06-15 NOTE — Clinical Note
Sasha Palacios was seen and treated in our emergency department on 6/15/2025.        No work until cleared by Family Doctor/Orthopedics        Diagnosis:     Sasha  .    She may return on this date:     Must be cleared by orthopedic surgery prior to returning to work.      If you have any questions or concerns, please don't hesitate to call.      Joshua Loyola MD    ______________________________           _______________          _______________  Hospital Representative                              Date                                Time

## 2025-06-15 NOTE — ED PROVIDER NOTES
"Time reflects when diagnosis was documented in both MDM as applicable and the Disposition within this note       Time User Action Codes Description Comment    6/15/2025 11:28 AM Joshua Loyola Add [M25.511] Acute pain of right shoulder     6/15/2025 11:33 AM Joshua Loyola Add [S42.031A] Closed displaced fracture of acromial end of right clavicle, initial encounter           ED Disposition       ED Disposition   Discharge    Condition   Stable    Date/Time   Sun Rene 15, 2025 11:34 AM    Comment   Sasha Palacios discharge to home/self care.                   Assessment & Plan       Medical Decision Making  Previous records reviewed.    /75 (BP Location: Left arm)   Pulse 91   Temp (!) 97.4 °F (36.3 °C) (Temporal)   Resp 18   LMP 04/24/2025   SpO2 100%   OB Status Implant   Smoking Status Every Day  .     DDX includes but not limited to: possible fx     Plan:     Imaging:   -XR R shoulder, clavicle     Medications:  - Tylenol, motrin    Lateral R clavicle fx, displaced. Sling applied     Upon re-evaluation patient reports improvement in pain.     All labs reviewed and utilized in the medical decision making process  All radiology studies independently viewed by me and interpreted by the radiologist.      Disposition:   I reviewed results with patient who expressed understanding and agrees with plan for ortho follow up, PCP follow up to discuss syncopal episode.   Return precaution discussed.  All questions were answered at this time.  Pt given necessary referrals with instructions to follow up.      Portions of the record may have been created with voice recognition software. Occasional wrong word or \"sound a like\" substitutions may have occurred due to the inherent limitations of voice recognition software. Read the chart carefully and recognize, using context, where substitutions have occurred.     Amount and/or Complexity of Data Reviewed  Radiology: ordered.    Risk  OTC drugs.  Prescription drug " "management.             Medications   acetaminophen (TYLENOL) tablet 650 mg (650 mg Oral Given 6/15/25 1102)   ibuprofen (MOTRIN) tablet 400 mg (400 mg Oral Given 6/15/25 1102)       ED Risk Strat Scores                    No data recorded        SBIRT 22yo+      Flowsheet Row Most Recent Value   Initial Alcohol Screen: US AUDIT-C     1. How often do you have a drink containing alcohol? 2 Filed at: 06/15/2025 1044   2. How many drinks containing alcohol do you have on a typical day you are drinking?  0 Filed at: 06/15/2025 1044   3b. FEMALE Any Age, or MALE 65+: How often do you have 4 or more drinks on one occassion? 1 Filed at: 06/15/2025 1044   Audit-C Score 3 Filed at: 06/15/2025 1044   ANIVAL: How many times in the past year have you...    Used an illegal drug or used a prescription medication for non-medical reasons? Never Filed at: 06/15/2025 1044                            History of Present Illness       Chief Complaint   Patient presents with    Shoulder Pain     Pt reports drinking last night and falling onto right shoulder, now having shoulder, elbow, and collar bone pain.       Past Medical History[1]   Past Surgical History[2]   Family History[3]   Social History[4]   E-Cigarette/Vaping    E-Cigarette Use Current Every Day User       E-Cigarette/Vaping Substances    Nicotine Yes     THC No     CBD No     Flavoring Yes     Other No     Unknown No       I have reviewed and agree with the history as documented.     24 yo F presents for evaluation of R shoulder pain. Pt states she was drinking heavily last night, at some point \"passed out\" and fell to the floor from standing height. Fall was witnessed by friends, no seizure-like activity witness. Pt regained consciousness immediately after. Pt has history of syncopal episodes and known VSD. Pt initially felt ok but the pain has prevented her from moving her arm so she came in for evaluation.         Review of Systems   Neurological:  Positive for syncope. "           Objective       ED Triage Vitals [06/15/25 1032]   Temperature Pulse Blood Pressure Respirations SpO2 Patient Position - Orthostatic VS   (!) 97.4 °F (36.3 °C) 91 127/75 18 100 % Sitting      Temp Source Heart Rate Source BP Location FiO2 (%) Pain Score    Temporal Monitor Left arm -- 10 - Worst Possible Pain      Vitals      Date and Time Temp Pulse SpO2 Resp BP Pain Score FACES Pain Rating User   06/15/25 1102 -- -- -- -- -- 10 - Worst Possible Pain -- LTF   06/15/25 1032 97.4 °F (36.3 °C) 91 100 % 18 127/75 10 - Worst Possible Pain -- CS            Physical Exam  Vitals reviewed.   Constitutional:       Appearance: Normal appearance. She is normal weight.   HENT:      Head: Normocephalic and atraumatic.      Right Ear: External ear normal.      Left Ear: External ear normal.      Nose: Nose normal.      Mouth/Throat:      Mouth: Mucous membranes are moist.      Pharynx: Oropharynx is clear.     Eyes:      Extraocular Movements: Extraocular movements intact.      Conjunctiva/sclera: Conjunctivae normal.      Pupils: Pupils are equal, round, and reactive to light.       Cardiovascular:      Rate and Rhythm: Normal rate and regular rhythm.      Pulses: Normal pulses.      Heart sounds: Normal heart sounds.   Pulmonary:      Effort: Pulmonary effort is normal.      Breath sounds: Normal breath sounds.   Abdominal:      Palpations: Abdomen is soft.      Tenderness: There is no abdominal tenderness.     Musculoskeletal:         General: Normal range of motion.      Right shoulder: Tenderness present.      Right elbow: Normal.      Cervical back: Normal range of motion. No tenderness.      Comments: No skin changes or tenting. TTP over AC joint.      Skin:     General: Skin is warm and dry.      Capillary Refill: Capillary refill takes less than 2 seconds.     Neurological:      General: No focal deficit present.      Mental Status: She is alert and oriented to person, place, and time.         Results Reviewed        None            XR shoulder 2+ views RIGHT    (Results Pending)   XR clavicle RIGHT    (Results Pending)       Procedures    ED Medication and Procedure Management   Prior to Admission Medications   Prescriptions Last Dose Informant Patient Reported? Taking?   acetaminophen (TYLENOL) 325 mg tablet  Self No No   Sig: Take 2 tablets (650 mg total) by mouth every 4 (four) hours as needed for mild pain or headaches   fluticasone (FLONASE) 50 mcg/act nasal spray   No No   Si spray into each nostril daily   hydrocortisone (ANUSOL-HC) 2.5 % rectal cream   No No   Sig: Apply topically 2 (two) times a day   ibuprofen (MOTRIN) 800 mg tablet   No No   Sig: Take 1 tablet (800 mg total) by mouth every 8 (eight) hours as needed for moderate pain (pelvic cramping)   intrauterine copper (PARAGARD) IUD   Yes No   Si each by Intrauterine Device route Once every 10 years   lidocaine (XYLOCAINE) 5 % ointment   No No   Sig: Apply topically 3 (three) times a day as needed for mild pain or moderate pain External vulvar lesions   polymyxin b-trimethoprim (POLYTRIM) ophthalmic solution   No No   Sig: Administer 1 drop to both eyes every 4 (four) hours   Patient not taking: Reported on 2025   valACYclovir (VALTREX) 1,000 mg tablet   No No   Sig: Take 1 tablet (1,000 mg total) by mouth 2 (two) times a day for 7 days   valACYclovir (VALTREX) 500 mg tablet   No No   Sig: Take 1 tablet (500 mg total) by mouth 2 (two) times a day For HSV suppression. Start after completing the HSV treatment regimen.      Facility-Administered Medications: None     Discharge Medication List as of 6/15/2025 11:50 AM        CONTINUE these medications which have NOT CHANGED    Details   acetaminophen (TYLENOL) 325 mg tablet Take 2 tablets (650 mg total) by mouth every 4 (four) hours as needed for mild pain or headaches, Starting Wed 11/3/2021, Normal      fluticasone (FLONASE) 50 mcg/act nasal spray 1 spray into each nostril daily, Starting Tue  8/6/2024, Normal      hydrocortisone (ANUSOL-HC) 2.5 % rectal cream Apply topically 2 (two) times a day, Starting Sun 5/25/2025, Normal      ibuprofen (MOTRIN) 800 mg tablet Take 1 tablet (800 mg total) by mouth every 8 (eight) hours as needed for moderate pain (pelvic cramping), Starting Wed 5/28/2025, Normal      intrauterine copper (PARAGARD) IUD 1 each by Intrauterine Device route Once every 10 years, Historical Med      lidocaine (XYLOCAINE) 5 % ointment Apply topically 3 (three) times a day as needed for mild pain or moderate pain External vulvar lesions, Starting Wed 5/28/2025, Normal      polymyxin b-trimethoprim (POLYTRIM) ophthalmic solution Administer 1 drop to both eyes every 4 (four) hours, Starting Sun 4/20/2025, Normal      valACYclovir (VALTREX) 500 mg tablet Take 1 tablet (500 mg total) by mouth 2 (two) times a day For HSV suppression. Start after completing the HSV treatment regimen., Starting Wed 5/28/2025, Until Tue 8/26/2025, Normal             ED SEPSIS DOCUMENTATION   Time reflects when diagnosis was documented in both MDM as applicable and the Disposition within this note       Time User Action Codes Description Comment    6/15/2025 11:28 AM Joshua Loyola Add [M25.511] Acute pain of right shoulder     6/15/2025 11:33 AM Joshua Loyola Add [S42.031A] Closed displaced fracture of acromial end of right clavicle, initial encounter                      [1]   Past Medical History:  Diagnosis Date    Anxiety     Depression     Dizziness     Ear problems     Fatigue     Heart murmur     Hyperlipidemia     Migraine     VSD (ventricular septal defect and aortic arch hypoplasia    [2] No past surgical history on file.  [3]   Family History  Problem Relation Name Age of Onset    Stroke Mother Jas Sahu     Heart disease Father bethany     Stroke Maternal Grandmother Ella Sahu    [4]   Social History  Tobacco Use    Smoking status: Every Day     Types: E-Cigarettes    Smokeless tobacco: Never     Tobacco comments:     vaping   Vaping Use    Vaping status: Every Day    Substances: Nicotine, Flavoring   Substance Use Topics    Alcohol use: No    Drug use: No        Joshua Loyola MD  06/15/25 [id number]

## 2025-06-15 NOTE — ED ATTENDING ATTESTATION
6/15/2025  I, Jenae Hayes MD, saw and evaluated the patient. I have discussed the patient with the resident/non-physician practitioner and agree with the resident's/non-physician practitioner's findings, Plan of Care, and MDM as documented in the resident's/non-physician practitioner's note, except where noted. All available labs and Radiology studies were reviewed.  I was present for key portions of any procedure(s) performed by the resident/non-physician practitioner and I was immediately available to provide assistance.       At this point I agree with the current assessment done in the Emergency Department.  I have conducted an independent evaluation of this patient a history and physical is as follows:  This is a 25-year-old woman who was drinking last night with a friend when she had a syncopal event and fell, landing on her right shoulder.  Has been having pain at the point of her shoulder since that time.  Patient is right-hand dominant.  States that it hurts to move the shoulder.  Slept with ice on it last night.  No prior injury or surgery to the shoulder.  Patient states that she does not drink heavily regularly.  States that she does have frequent syncope and is followed by cardiologist, but does not have a known physiologic reason for the fainting spells.  On exam the patient awake and alert with normal vital signs.  She has tenderness over the distal portion of her right clavicle.  She is neurovascularly intact in the arm and hand.  She has no chest wall tenderness and her heart and lungs are normal.  Impression: Fall, likely clavicle injury versus AC separation.  Will plan to x-ray shoulder, clavicle, will do EKG for syncopal event.  X-ray with distal clavicle fracture, still plan to EKG. EKG with normal sinus rhythm, normal intervals, no evidence of ectopy  ED Course         Critical Care Time  Procedures

## 2025-06-16 ENCOUNTER — OFFICE VISIT (OUTPATIENT)
Dept: OBGYN CLINIC | Facility: CLINIC | Age: 25
End: 2025-06-16
Attending: EMERGENCY MEDICINE
Payer: COMMERCIAL

## 2025-06-16 VITALS — HEIGHT: 59 IN | BODY MASS INDEX: 20.76 KG/M2 | WEIGHT: 103 LBS

## 2025-06-16 DIAGNOSIS — S42.031A CLOSED DISPLACED FRACTURE OF ACROMIAL END OF RIGHT CLAVICLE, INITIAL ENCOUNTER: Primary | ICD-10-CM

## 2025-06-16 LAB
ATRIAL RATE: 81 BPM
P AXIS: 72 DEGREES
PR INTERVAL: 160 MS
QRS AXIS: 68 DEGREES
QRSD INTERVAL: 76 MS
QT INTERVAL: 360 MS
QTC INTERVAL: 418 MS
T WAVE AXIS: 63 DEGREES
VENTRICULAR RATE: 81 BPM

## 2025-06-16 PROCEDURE — 93010 ELECTROCARDIOGRAM REPORT: CPT | Performed by: INTERNAL MEDICINE

## 2025-06-16 PROCEDURE — 99204 OFFICE O/P NEW MOD 45 MIN: CPT | Performed by: ORTHOPAEDIC SURGERY

## 2025-06-16 NOTE — LETTER
June 16, 2025     Patient: Sasha Palacios  YOB: 2000  Date of Visit: 6/16/2025      To Whom it May Concern:    Sasha Palacios is under my professional care. Sasha was seen in my office on 6/16/2025. Sasha should perform desk duty only for the next 4 weeks.    If you have any questions or concerns, please don't hesitate to call.         Sincerely,          Tucker Mcgarry MD        CC: No Recipients

## 2025-06-16 NOTE — PROGRESS NOTES
Sports Medicine and Shoulder Surgery    Sasha Palacios, 25 y.o. female   MRN# [id number]   : 2000        Assessment & Plan  Closed displaced fracture of acromial end of right clavicle, initial encounter              Recommend acetaminophen 500 mg every 6 hours as needed for breakthrough pain  Advise activity modification by avoiding overhead movements, heavy lifting, or activities that exacerbate pain   Encourage light activities within pain tolerance to avoid stiffness  Recommends to discontinue the sling in 2 weeks. Can come out of the sling while at home  Work note provided for desk duty only for the next 4 weeks  Follow up: 4 week  If any issues, questions, or concerns arise between now and the next appointment, we have encouraged the patient contact our team.            Chief Complaint:    Right Shoulder Pain and Dysfunction    Subjective:   Patient is a right hand dominant individual presenting with acute onset of severe shoulder pain and dysfunction that began on 25 after she passed out from drinking. She presented to the ED on 6/15/25 in which she obtained an x-ray and comfort sling. The pain is anterolateral in nature, sharp, worsens with overhead activities, and radiates into the lateral aspect of the shoulder/upper arm. The patient denies any prior similar episodes but reports difficulty with activities of daily living, including dressing and lifting objects, reaching back and inability to sleep on the affected side due to discomfort.  They have been using over-the-counter NSAIDs/tylenol with minimal relief. There is no associated fever, numbness or weakness in the distal upper extremity. The patient notes significant limitations in motion due to pain and weakness.  No other orthopedic injuries or pain to report today.      Physical Examination:  General/Constitutional: Sitting comfortably in no apparent distress  Eyes: Normal ocular motion  Respiratory: Non-labored breathing  Psych: Normal  mood and affect, oriented to person, place and time. Appropriate affect.  Musculoskeletal: Normal, except as noted in detailed exam and in HPI.    Cervical Spine  No tenderness to palpation over the cervical spine in the midline or of the paraspinal muscles  Full range of motion without pain  Negative spurlings   Negative Lhermitte test    Right Shoulder(s)  Inspection  No visible deformity, swelling or erythema  Palpation  Negative over the AC  joint  Negative  Tenderness at the greater tuberosity and the anterior aspect of the shoulder (bicipital groove)  Positive over lateral 1/3rd of right clavicle  Range of Motion  Deferred  Strength Testing  Deferred  Special Orthopedic Tests  Deferred  Neurological examination  Sensation intact to light touch in over the C5-T1 dermatomes  Fingers warm and well perfused     Imaging Studies:  Independent interpretation of shoulder x-rays demonstrate Closed displaced fracture of lateral 1/3rd of the right clavicle       Review of Systems:  General- denies fever/chills  HEENT- denies hearing loss or sore throat  Eyes- denies eye pain or visual disturbances, denies red eyes  Respiratory- denies cough or SOB  Cardio- denies chest pain or palpitations  GI- denies abdominal pain  Endocrine- denies urinary frequency  Urinary- denies pain with urination  Musculoskeletal- Negative except noted above  Skin- denies rashes or wounds  Neurological- denies dizziness or headache  Psychiatric- denies anxiety or difficulty concentrating      Allergies:  Allergies   Allergen Reactions    Penicillins Hives       Medications:  Current Medications[1]    Past Medical History:  Past Medical History[2]     Past Surgical History:  Past Surgical History[3]     Family History:  Family History[4]     Social History:  Social History     Socioeconomic History    Marital status: Single     Spouse name: Not on file    Number of children: Not on file    Years of education: Not on file    Highest education  level: Not on file   Occupational History    Not on file   Tobacco Use    Smoking status: Every Day     Types: E-Cigarettes    Smokeless tobacco: Never    Tobacco comments:     vaping   Vaping Use    Vaping status: Every Day    Substances: Nicotine, Flavoring   Substance and Sexual Activity    Alcohol use: No    Drug use: No    Sexual activity: Yes     Partners: Male     Birth control/protection: I.U.D.     Comment: paragard insert 4/10/2025   Other Topics Concern    Not on file   Social History Narrative    Not on file     Social Drivers of Health     Financial Resource Strain: Medium Risk (3/8/2022)    Overall Financial Resource Strain (CARDIA)     Difficulty of Paying Living Expenses: Somewhat hard   Food Insecurity: No Food Insecurity (3/8/2022)    Hunger Vital Sign     Worried About Running Out of Food in the Last Year: Never true     Ran Out of Food in the Last Year: Never true   Transportation Needs: No Transportation Needs (3/8/2022)    PRAPARE - Transportation     Lack of Transportation (Medical): No     Lack of Transportation (Non-Medical): No   Physical Activity: Not on file   Stress: Stress Concern Present (3/8/2022)    [employer] [place] [employer] - Occupational Stress Questionnaire     Feeling of Stress : To some extent   Social Connections: Not on file   Intimate Partner Violence: Not on file   Housing Stability: Unknown (3/8/2022)    Housing Stability Vital Sign     Unable to Pay for Housing in the Last Year: No     Number of Places Lived in the Last Year: Not on file     Unstable Housing in the Last Year: No         Objective:   Body mass index is 20.8 kg/m².   ---------------------------------------------------------------------  Tucker Mcgarry MD, PhD   Orthopedic Surgery, [facility]   Sports Medicine and Shoulder Surgery    The complexity of the medical decision making, including the comprehensive history, detailed examination and moderate risk assessment,  supports coding at a Level 4 for this encounter     Scribe Attestation      I,:  Edenilson Gonzales am acting as a scribe while in the presence of the attending physician.:       I,:  Tucker Mcgarry MD personally performed the services described in this documentation    as scribed in my presence.:                          [1]   Current Outpatient Medications:     acetaminophen (TYLENOL) 325 mg tablet, Take 2 tablets (650 mg total) by mouth every 4 (four) hours as needed for mild pain or headaches, Disp: 30 tablet, Rfl: 0    fluticasone (FLONASE) 50 mcg/act nasal spray, 1 spray into each nostril daily, Disp: 11.1 mL, Rfl: 0    hydrocortisone (ANUSOL-HC) 2.5 % rectal cream, Apply topically 2 (two) times a day, Disp: 28 g, Rfl: 0    ibuprofen (MOTRIN) 800 mg tablet, Take 1 tablet (800 mg total) by mouth every 8 (eight) hours as needed for moderate pain (pelvic cramping), Disp: 30 tablet, Rfl: 0    intrauterine copper (PARAGARD) IUD, 1 each by Intrauterine Device route Once every 10 years, Disp: , Rfl:     lidocaine (XYLOCAINE) 5 % ointment, Apply topically 3 (three) times a day as needed for mild pain or moderate pain External vulvar lesions, Disp: 50 g, Rfl: 1    valACYclovir (VALTREX) 500 mg tablet, Take 1 tablet (500 mg total) by mouth 2 (two) times a day For HSV suppression. Start after completing the HSV treatment regimen., Disp: 180 tablet, Rfl: 1    polymyxin b-trimethoprim (POLYTRIM) ophthalmic solution, Administer 1 drop to both eyes every 4 (four) hours (Patient not taking: Reported on 5/24/2025), Disp: 10 mL, Rfl: 0    valACYclovir (VALTREX) 1,000 mg tablet, Take 1 tablet (1,000 mg total) by mouth 2 (two) times a day for 7 days, Disp: 14 tablet, Rfl: 0  [2]   Past Medical History:  Diagnosis Date    Anxiety     Depression     Dizziness     Ear problems     Fatigue     Heart murmur     Hyperlipidemia     Migraine     VSD (ventricular septal defect and aortic arch hypoplasia    [3] No past surgical history on  file.  [4]   Family History  Problem Relation Name Age of Onset    Stroke Mother Jas Luis Alberto     Heart disease Father bethany     Stroke Maternal Grandmother Ella Sahu

## 2025-07-14 ENCOUNTER — APPOINTMENT (OUTPATIENT)
Dept: RADIOLOGY | Age: 25
End: 2025-07-14
Payer: COMMERCIAL

## 2025-07-14 ENCOUNTER — OFFICE VISIT (OUTPATIENT)
Dept: OBGYN CLINIC | Facility: CLINIC | Age: 25
End: 2025-07-14
Payer: COMMERCIAL

## 2025-07-14 VITALS — BODY MASS INDEX: 20.76 KG/M2 | HEIGHT: 59 IN | WEIGHT: 103 LBS

## 2025-07-14 DIAGNOSIS — S42.031A CLOSED DISPLACED FRACTURE OF ACROMIAL END OF RIGHT CLAVICLE, INITIAL ENCOUNTER: ICD-10-CM

## 2025-07-14 DIAGNOSIS — S42.031A CLOSED DISPLACED FRACTURE OF ACROMIAL END OF RIGHT CLAVICLE, INITIAL ENCOUNTER: Primary | ICD-10-CM

## 2025-07-14 PROCEDURE — 73000 X-RAY EXAM OF COLLAR BONE: CPT

## 2025-07-14 PROCEDURE — 99214 OFFICE O/P EST MOD 30 MIN: CPT | Performed by: ORTHOPAEDIC SURGERY

## 2025-07-14 NOTE — PROGRESS NOTES
Sports Medicine Surgery    Sasha Palacios, 25 y.o. female   MRN# [id number]   : 2000      REASON FOR FOLLOW-UP  Sasha Palacios is a 25 y.o. female who presents for follow-up of the right Clavicle    HISTORY OF PRESENT ILLNESS  Following our last visit, we decided to initially treat her Clavicle fracture non-operatively. Our plan was to manage their symptoms conservatively. Today, patient comes in for further evaluation.  Patient said she is doing well at this time with no pain or difficulty with range of motion.    PHYSICAL EXAM  General:   Well-appearing  No acute distress  Appears stated age    right Shoulder  Negative TTP along distal clavicle  Shoulder effusion none present  Shoulder abduction 180/180  Shoulder forward flexion 180/180  Skin is intact with no erythema, warmth or drainage  Motor strength intact distally  Sensation to light touch is normal in the axillary, radial, ulnar, and median nerve distributions.  Fingers warm and perfused      DIAGNOSTIC IMAGING:  Independent interpretation of the right clavicle x-rays demonstrate improved alignment and age-appropriate healing of distal clavicle fracture    Procedure: XR clavicle RIGHT  Result Date: 2025  Narrative: XR CLAVICLE RIGHT INDICATION: fall. COMPARISON: Same-day shoulder radiograph FINDINGS: Redemonstrated from today shoulder exam right distal clavicle fracture with approximately half shaft width inferior displacement. No significant degenerative changes. No lytic or blastic osseous lesion. Unremarkable soft tissues.     Impression: Redemonstrated displaced right distal clavicle fracture. Computerized Assisted Algorithm (CAA) may have been used to analyze all applicable images. Resident: Hayden Judd I, the attending radiologist, have reviewed the images and agree with the final report above. Workstation performed: [id number]     Procedure: XR shoulder 2+ views RIGHT  Result Date: 2025  Narrative: XR SHOULDER 2+ VW RIGHT  INDICATION: shoulder pian. COMPARISON: None FINDINGS: Oblique fracture of the right distal clavicle. Proximal clavicle is displaced superiorly. No significant degenerative changes. No lytic or blastic osseous lesion. Unremarkable soft tissues.     Impression: Right distal clavicle fracture with displacement. Clinical service was aware of the findings at the time of dictation. Computerized Assisted Algorithm (CAA) may have been used to analyze all applicable images. Workstation performed: [id number]         Assessment & Plan  Closed displaced fracture of acromial end of right clavicle, initial encounter    Orders:    XR clavicle right; Future         Recommended activity as tolerated and to use pain as her guide with progression with activity  Work note created allowing the patient to go back to work no restrictions  We did discuss follow-up in 6 to 8 weeks if patient preferred to ensure complete healing  Patient is understanding and agreeable to plan.  She is to call with any other questions or concerns      Scribe Attestation      I,:  Srinath Mendoza PA-C am acting as a scribe while in the presence of the attending physician.:       I,:  Tucker Mcgarry MD personally performed the services described in this documentation    as scribed in my presence.:

## 2025-07-14 NOTE — LETTER
July 14, 2025     Patient: Sasha Palacios  YOB: 2000  Date of Visit: 7/14/2025      To Whom it May Concern:    Sasha Palacios is under my professional care. Sasha was seen in my office on 7/14/2025. Sasha may return to work with no restrictions.    If you have any questions or concerns, please don't hesitate to call.         Sincerely,          Tucker Mcgarry MD        CC: No Recipients

## 2025-08-06 ENCOUNTER — APPOINTMENT (EMERGENCY)
Dept: RADIOLOGY | Facility: HOSPITAL | Age: 25
End: 2025-08-06
Payer: COMMERCIAL

## 2025-08-06 ENCOUNTER — APPOINTMENT (EMERGENCY)
Dept: RADIOLOGY | Facility: HOSPITAL | Age: 25
End: 2025-08-06
Attending: EMERGENCY MEDICINE
Payer: COMMERCIAL

## 2025-08-06 ENCOUNTER — HOSPITAL ENCOUNTER (EMERGENCY)
Facility: HOSPITAL | Age: 25
Discharge: HOME | End: 2025-08-06
Attending: EMERGENCY MEDICINE
Payer: COMMERCIAL

## 2025-08-07 ENCOUNTER — TELEPHONE (OUTPATIENT)
Dept: FAMILY MEDICINE CLINIC | Facility: CLINIC | Age: 25
End: 2025-08-07